# Patient Record
Sex: MALE | Race: WHITE | NOT HISPANIC OR LATINO | Employment: OTHER | URBAN - METROPOLITAN AREA
[De-identification: names, ages, dates, MRNs, and addresses within clinical notes are randomized per-mention and may not be internally consistent; named-entity substitution may affect disease eponyms.]

---

## 2023-04-03 LAB
THYROTROPIN (MIU/L) IN SER/PLAS BY DETECTION LIMIT <= 0.05 MIU/L: 0.04 MIU/L (ref 0.44–3.98)
THYROXINE (T4) FREE (NG/DL) IN SER/PLAS: 1.52 NG/DL (ref 0.61–1.12)

## 2023-05-04 LAB — PROSTATE SPECIFIC AG (NG/ML) IN SER/PLAS: 3.85 NG/ML (ref 0–4)

## 2023-10-01 DIAGNOSIS — N40.1 BENIGN PROSTATIC HYPERPLASIA WITH LOWER URINARY TRACT SYMPTOMS, SYMPTOM DETAILS UNSPECIFIED: ICD-10-CM

## 2023-10-01 DIAGNOSIS — E03.9 HYPOTHYROIDISM, UNSPECIFIED TYPE: Primary | ICD-10-CM

## 2023-10-09 RX ORDER — LEVOTHYROXINE SODIUM 137 UG/1
TABLET ORAL
Qty: 120 TABLET | Refills: 1 | Status: SHIPPED | OUTPATIENT
Start: 2023-10-09 | End: 2024-05-02 | Stop reason: SDUPTHER

## 2023-10-09 RX ORDER — TAMSULOSIN HYDROCHLORIDE 0.4 MG/1
0.4 CAPSULE ORAL DAILY
Qty: 100 CAPSULE | Refills: 1 | Status: SHIPPED | OUTPATIENT
Start: 2023-10-09 | End: 2024-04-29 | Stop reason: SDUPTHER

## 2023-10-09 RX ORDER — FINASTERIDE 5 MG/1
5 TABLET, FILM COATED ORAL DAILY
Qty: 100 TABLET | Refills: 1 | Status: SHIPPED | OUTPATIENT
Start: 2023-10-09 | End: 2024-04-29 | Stop reason: SDUPTHER

## 2023-11-07 PROBLEM — L91.8 OTHER HYPERTROPHIC DISORDERS OF THE SKIN: Status: ACTIVE | Noted: 2018-02-07

## 2023-11-07 PROBLEM — R97.20 ELEVATED PSA: Status: ACTIVE | Noted: 2023-11-07

## 2023-11-07 PROBLEM — K21.9 GASTROESOPHAGEAL REFLUX DISEASE: Status: ACTIVE | Noted: 2018-01-04

## 2023-11-07 PROBLEM — I47.10 SUPRAVENTRICULAR TACHYCARDIA (CMS-HCC): Status: ACTIVE | Noted: 2023-11-07

## 2023-11-07 PROBLEM — R31.9 BLOOD IN URINE: Status: ACTIVE | Noted: 2023-11-07

## 2023-11-07 PROBLEM — M72.2 PLANTAR FASCIAL FIBROMATOSIS: Status: ACTIVE | Noted: 2018-01-04

## 2023-11-07 PROBLEM — L81.4 OTHER MELANIN HYPERPIGMENTATION: Status: ACTIVE | Noted: 2018-02-07

## 2023-11-07 PROBLEM — R97.20 HIGH PROSTATE SPECIFIC ANTIGEN (PSA): Status: ACTIVE | Noted: 2018-01-04

## 2023-11-07 PROBLEM — N40.0 BENIGN PROSTATIC HYPERPLASIA WITHOUT URINARY OBSTRUCTION: Status: ACTIVE | Noted: 2018-01-04

## 2023-11-07 PROBLEM — L73.8 OTHER SPECIFIED FOLLICULAR DISORDERS: Status: ACTIVE | Noted: 2018-02-07

## 2023-11-07 PROBLEM — I47.20 VENTRICULAR TACHYCARDIA (MULTI): Status: ACTIVE | Noted: 2023-11-07

## 2023-11-07 PROBLEM — R31.9 HEMATURIA: Status: ACTIVE | Noted: 2023-11-07

## 2023-11-07 PROBLEM — D22.5 MELANOCYTIC NEVI OF TRUNK: Status: ACTIVE | Noted: 2018-02-07

## 2023-11-07 PROBLEM — D22.30 MELANOCYTIC NEVI OF UNSPECIFIED PART OF FACE: Status: ACTIVE | Noted: 2018-02-07

## 2023-11-07 PROBLEM — D18.01 HEMANGIOMA OF SKIN AND SUBCUTANEOUS TISSUE: Status: ACTIVE | Noted: 2018-02-07

## 2023-11-07 PROBLEM — N18.30 STAGE 3 CHRONIC KIDNEY DISEASE (MULTI): Status: ACTIVE | Noted: 2020-06-19

## 2023-11-07 PROBLEM — L25.9 CONTACT DERMATITIS: Status: ACTIVE | Noted: 2018-01-04

## 2023-11-07 PROBLEM — D22.60 MELANOCYTIC NEVI OF UNSPECIFIED UPPER LIMB, INCLUDING SHOULDER: Status: ACTIVE | Noted: 2018-02-07

## 2023-11-07 PROBLEM — L82.1 OTHER SEBORRHEIC KERATOSIS: Status: ACTIVE | Noted: 2018-02-07

## 2023-11-07 PROBLEM — E78.5 HYPERLIPIDEMIA, UNSPECIFIED: Status: ACTIVE | Noted: 2023-11-07

## 2023-11-07 PROBLEM — R30.0 DYSURIA: Status: ACTIVE | Noted: 2023-11-07

## 2023-11-07 RX ORDER — VIT C/E/ZN/COPPR/LUTEIN/ZEAXAN 250MG-90MG
1 CAPSULE ORAL 2 TIMES DAILY
COMMUNITY

## 2023-11-07 RX ORDER — LANCETS 33 GAUGE
EACH MISCELLANEOUS 2 TIMES DAILY
COMMUNITY
Start: 2022-09-30 | End: 2024-05-02 | Stop reason: SDUPTHER

## 2023-11-07 RX ORDER — DOXAZOSIN 2 MG/1
TABLET ORAL
COMMUNITY
End: 2024-03-08 | Stop reason: ALTCHOICE

## 2023-11-07 RX ORDER — ATORVASTATIN CALCIUM 40 MG/1
40 TABLET, FILM COATED ORAL DAILY
COMMUNITY
End: 2024-03-12

## 2023-11-07 RX ORDER — PHENOL/SODIUM PHENOLATE
20 AEROSOL, SPRAY (ML) MUCOUS MEMBRANE DAILY
COMMUNITY
End: 2024-05-02 | Stop reason: SDUPTHER

## 2023-11-07 RX ORDER — CALCIUM CARBONATE 500(1250)
TABLET,CHEWABLE ORAL
COMMUNITY
End: 2024-03-08 | Stop reason: SDUPTHER

## 2023-11-07 RX ORDER — TRIAMTERENE AND HYDROCHLOROTHIAZIDE 37.5; 25 MG/1; MG/1
1 CAPSULE ORAL DAILY
COMMUNITY
End: 2024-03-12

## 2023-11-07 RX ORDER — DILTIAZEM HYDROCHLORIDE 120 MG/1
120 CAPSULE, EXTENDED RELEASE ORAL DAILY
COMMUNITY
Start: 2014-11-12 | End: 2024-03-12

## 2023-11-07 RX ORDER — LANCETS 28 GAUGE
EACH MISCELLANEOUS
COMMUNITY
Start: 2020-07-30

## 2023-11-07 RX ORDER — IBUPROFEN 600 MG/1
600 TABLET ORAL EVERY 6 HOURS PRN
COMMUNITY
Start: 2017-05-26 | End: 2024-03-08 | Stop reason: ALTCHOICE

## 2023-11-07 RX ORDER — DOXAZOSIN 4 MG/1
2 TABLET ORAL NIGHTLY
COMMUNITY
End: 2023-11-08

## 2023-11-07 RX ORDER — ACETAMINOPHEN 500 MG
5000 TABLET ORAL DAILY
COMMUNITY
End: 2024-06-07 | Stop reason: ENTERED-IN-ERROR

## 2023-11-07 RX ORDER — LEVOTHYROXINE SODIUM 150 UG/1
TABLET ORAL
COMMUNITY
End: 2023-11-08

## 2023-11-07 RX ORDER — ATORVASTATIN CALCIUM 20 MG/1
TABLET, FILM COATED ORAL
COMMUNITY
End: 2023-11-08

## 2023-11-08 ENCOUNTER — OFFICE VISIT (OUTPATIENT)
Dept: PRIMARY CARE | Facility: CLINIC | Age: 73
End: 2023-11-08
Payer: MEDICARE

## 2023-11-08 VITALS
SYSTOLIC BLOOD PRESSURE: 136 MMHG | WEIGHT: 173 LBS | OXYGEN SATURATION: 98 % | HEART RATE: 67 BPM | BODY MASS INDEX: 24.13 KG/M2 | DIASTOLIC BLOOD PRESSURE: 84 MMHG

## 2023-11-08 DIAGNOSIS — R10.31 RIGHT LOWER QUADRANT PAIN: Primary | ICD-10-CM

## 2023-11-08 DIAGNOSIS — R10.33 UMBILICAL PAIN: ICD-10-CM

## 2023-11-08 PROCEDURE — 3079F DIAST BP 80-89 MM HG: CPT | Performed by: INTERNAL MEDICINE

## 2023-11-08 PROCEDURE — 1036F TOBACCO NON-USER: CPT | Performed by: INTERNAL MEDICINE

## 2023-11-08 PROCEDURE — 1159F MED LIST DOCD IN RCRD: CPT | Performed by: INTERNAL MEDICINE

## 2023-11-08 PROCEDURE — 3075F SYST BP GE 130 - 139MM HG: CPT | Performed by: INTERNAL MEDICINE

## 2023-11-08 PROCEDURE — 99213 OFFICE O/P EST LOW 20 MIN: CPT | Performed by: INTERNAL MEDICINE

## 2023-11-08 ASSESSMENT — ENCOUNTER SYMPTOMS
LOSS OF SENSATION IN FEET: 0
DEPRESSION: 0
OCCASIONAL FEELINGS OF UNSTEADINESS: 0

## 2023-11-08 NOTE — PROGRESS NOTES
Subjective   Patient ID: Alfonzo Baldwin is a 73 y.o. male who presents for Abdominal Pain (ABDOMINAL PAIN X 1 MONTH).    HPI   Right lower quad/umbilical discomfort for about a month.  No n/v/diarrhea/constipation.   +recent colonoscopy---+multiple polyps.    Review of Systems  As above    Objective   /84   Pulse 67   Wt 78.5 kg (173 lb)   SpO2 98%   BMI 24.13 kg/m²     Physical Exam  Constitutional:       Appearance: Normal appearance. He is normal weight.   Cardiovascular:      Rate and Rhythm: Normal rate and regular rhythm.      Heart sounds: Normal heart sounds. No murmur heard.  Pulmonary:      Effort: Pulmonary effort is normal.      Breath sounds: Normal breath sounds.   Abdominal:      General: Bowel sounds are normal.      Palpations: Abdomen is soft. There is no mass.      Tenderness: There is abdominal tenderness (rads to the right umbilicus). There is no rebound.      Hernia: No hernia is present.   Skin:     General: Skin is warm and dry.   Neurological:      Mental Status: He is alert.       Assessment/Plan     Problem List Items Addressed This Visit    None  Visit Diagnoses       Right lower quadrant pain    -  Primary    Relevant Orders    US abdomen complete    Umbilical pain        Relevant Orders    US abdomen complete

## 2023-11-21 ENCOUNTER — CLINICAL SUPPORT (OUTPATIENT)
Dept: PRIMARY CARE | Facility: CLINIC | Age: 73
End: 2023-11-21
Payer: MEDICARE

## 2023-11-21 DIAGNOSIS — R10.31 RIGHT LOWER QUADRANT PAIN: ICD-10-CM

## 2023-11-21 DIAGNOSIS — R10.33 UMBILICAL PAIN: ICD-10-CM

## 2023-11-21 PROCEDURE — 76705 ECHO EXAM OF ABDOMEN: CPT | Performed by: RADIOLOGY

## 2023-11-21 PROCEDURE — 76700 US EXAM ABDOM COMPLETE: CPT | Performed by: RADIOLOGY

## 2023-12-08 ENCOUNTER — OFFICE VISIT (OUTPATIENT)
Dept: PRIMARY CARE | Facility: CLINIC | Age: 73
End: 2023-12-08
Payer: MEDICARE

## 2023-12-08 ENCOUNTER — TELEPHONE (OUTPATIENT)
Dept: PRIMARY CARE | Facility: CLINIC | Age: 73
End: 2023-12-08

## 2023-12-08 VITALS
DIASTOLIC BLOOD PRESSURE: 60 MMHG | RESPIRATION RATE: 16 BRPM | WEIGHT: 176 LBS | SYSTOLIC BLOOD PRESSURE: 140 MMHG | HEART RATE: 53 BPM | BODY MASS INDEX: 25.2 KG/M2 | HEIGHT: 70 IN

## 2023-12-08 DIAGNOSIS — R10.31 RIGHT LOWER QUADRANT ABDOMINAL PAIN: ICD-10-CM

## 2023-12-08 DIAGNOSIS — E11.65 TYPE 2 DIABETES MELLITUS WITH HYPERGLYCEMIA, WITHOUT LONG-TERM CURRENT USE OF INSULIN (MULTI): Primary | ICD-10-CM

## 2023-12-08 DIAGNOSIS — E11.21 TYPE 2 DIABETES MELLITUS WITH DIABETIC NEPHROPATHY, WITHOUT LONG-TERM CURRENT USE OF INSULIN (MULTI): Primary | ICD-10-CM

## 2023-12-08 LAB
POC FINGERSTICK BLOOD GLUCOSE: 172 MG/DL (ref 70–100)
POC HEMOGLOBIN A1C: 7.8 % (ref 4.2–6.5)

## 2023-12-08 PROCEDURE — 82962 GLUCOSE BLOOD TEST: CPT | Performed by: NURSE PRACTITIONER

## 2023-12-08 PROCEDURE — 99213 OFFICE O/P EST LOW 20 MIN: CPT | Performed by: NURSE PRACTITIONER

## 2023-12-08 PROCEDURE — 3078F DIAST BP <80 MM HG: CPT | Performed by: NURSE PRACTITIONER

## 2023-12-08 PROCEDURE — 1159F MED LIST DOCD IN RCRD: CPT | Performed by: NURSE PRACTITIONER

## 2023-12-08 PROCEDURE — 3077F SYST BP >= 140 MM HG: CPT | Performed by: NURSE PRACTITIONER

## 2023-12-08 PROCEDURE — 1036F TOBACCO NON-USER: CPT | Performed by: NURSE PRACTITIONER

## 2023-12-08 PROCEDURE — 83036 HEMOGLOBIN GLYCOSYLATED A1C: CPT | Performed by: NURSE PRACTITIONER

## 2023-12-08 RX ORDER — DAPAGLIFLOZIN 10 MG/1
10 TABLET, FILM COATED ORAL DAILY
Qty: 90 TABLET | Refills: 2 | Status: SHIPPED | OUTPATIENT
Start: 2023-12-08 | End: 2024-05-02 | Stop reason: SDUPTHER

## 2023-12-08 NOTE — PROGRESS NOTES
" Alfonzo Baldwin is a 73 y.o. here for a diabetic follow up exam.     Pt states they are doing well. Following a low carbohydrate diet and is active.     Up to date with eye and foot exams, pcp visits.     Aic up to 7.8  Last 7.1    Wt 176 - last 173  Meds for dm2 -   None    [facility]    Just got over abdominal pain just saw dr frias 11/23 and ok there    /60   Pulse 53   Resp 16   Ht 1.778 m (5' 10\")   Wt 79.8 kg (176 lb)   BMI 25.25 kg/m²      Lab Results   Component Value Date    POCGLU 172 (A) 12/08/2023    HGBA1C 7.8 (A) 12/08/2023    HGBA1C 5.9 06/19/2020    HGBA1C 7.1 10/04/2019   (  [unfilled]   Visit Vitals  /60   Pulse 53   Resp 16        Current Outpatient Medications on File Prior to Visit   Medication Sig Dispense Refill    atorvastatin (Lipitor) 40 mg tablet Take 1 tablet (40 mg) by mouth once daily.      blood sugar diagnostic (TRUETEST TEST STRIPS MISC)       calcium carbonate 500 mg calcium (1,250 mg) chewable tablet Chew.      cholecalciferol (Vitamin D-3) 5,000 Units tablet Take 1 tablet (5,000 Units) by mouth once daily.      dilTIAZem XR (Dilacor XR) 120 mg 24 hr capsule Take 1 capsule (120 mg) by mouth once daily.      doxazosin (Cardura) 2 mg tablet Take by mouth.      finasteride (Proscar) 5 mg tablet Take 1 tablet (5 mg) by mouth once daily. Do not crush, chew, or split. 100 tablet 1    FreeStyle Lancets 28 gauge by Does not apply route 2 times a day.      ibuprofen 600 mg tablet Take 1 tablet (600 mg) by mouth every 6 hours if needed.      lancets (Micro Thin Lancets) 33 gauge misc by Does not apply route 2 times a day.      levothyroxine (Synthroid, Levoxyl) 137 mcg tablet TAKE 1 TABLET BY MOUTH MONDAY THROUGH SATURDAY AND 2 TABLETS ON SUNDAY (Patient taking differently: EVERY OTHER DAY) 120 tablet 1    omeprazole (PriLOSEC) 20 mg tablet,delayed release (DR/EC) EC tablet Take 1 tablet (20 mg) by mouth once daily.      tamsulosin " (Flomax) 0.4 mg 24 hr capsule Take 1 capsule (0.4 mg) by mouth once daily. 100 capsule 1    triamterene-hydrochlorothiazid (Dyazide) 37.5-25 mg capsule Take 1 capsule by mouth once daily.      vit C,H-Bw-epjgl-lutein-zeaxan (PreserVision AREDS-2) 250-90-40-1 mg capsule Take by mouth.      VIT C-MULTIVIT-MIN-ELDERBERRY ORAL Elderberry-Maltodextrin POWD   Refills: 0       Active       No current facility-administered medications on file prior to visit.      Review of Systems   Physical Exam     Assessment and Plan:  No problem-specific Assessment & Plan notes found for this encounter.     Problem List Items Addressed This Visit    None  Visit Diagnoses         Codes    Type 2 diabetes mellitus with hyperglycemia, without long-term current use of insulin (CMS/[facility])    -  Primary E11.65    Relevant Orders    POCT Fingerstick Glucose manually resulted (Completed)    POCT glycosylated hemoglobin (Hb A1C) manually resulted (Completed)    Albumin , Urine Random

## 2023-12-08 NOTE — TELEPHONE ENCOUNTER
Pt wife went to  [facility] for pt - would be over $200, but that is too expensive. States you had some other suggestions of meds to call in?  Please advise

## 2023-12-12 ENCOUNTER — ANCILLARY PROCEDURE (OUTPATIENT)
Dept: RADIOLOGY | Facility: CLINIC | Age: 73
End: 2023-12-12
Payer: MEDICARE

## 2023-12-12 DIAGNOSIS — R10.31 RIGHT LOWER QUADRANT ABDOMINAL PAIN: ICD-10-CM

## 2023-12-12 PROCEDURE — 74176 CT ABD & PELVIS W/O CONTRAST: CPT

## 2023-12-12 PROCEDURE — 74176 CT ABD & PELVIS W/O CONTRAST: CPT | Performed by: RADIOLOGY

## 2023-12-13 DIAGNOSIS — R10.84 GENERALIZED ABDOMINAL PAIN: Primary | ICD-10-CM

## 2023-12-13 DIAGNOSIS — N28.1 COMPLEX RENAL CYST: ICD-10-CM

## 2023-12-13 NOTE — RESULT ENCOUNTER NOTE
Pls inform  Kidney stones but on the left. Rest is not new/it is chronic changes.  He has a nodule vs enlarging complex cyst left kidney needs an MRI. Ordered please set up.

## 2023-12-27 ENCOUNTER — HOSPITAL ENCOUNTER (OUTPATIENT)
Dept: RADIOLOGY | Facility: CLINIC | Age: 73
Discharge: HOME | End: 2023-12-27
Payer: MEDICARE

## 2023-12-27 DIAGNOSIS — R10.84 GENERALIZED ABDOMINAL PAIN: ICD-10-CM

## 2023-12-27 DIAGNOSIS — N28.1 COMPLEX RENAL CYST: ICD-10-CM

## 2023-12-27 PROCEDURE — 74181 MRI ABDOMEN W/O CONTRAST: CPT | Performed by: RADIOLOGY

## 2023-12-27 PROCEDURE — 74181 MRI ABDOMEN W/O CONTRAST: CPT

## 2024-03-08 ENCOUNTER — OFFICE VISIT (OUTPATIENT)
Dept: PRIMARY CARE | Facility: CLINIC | Age: 74
End: 2024-03-08
Payer: MEDICARE

## 2024-03-08 VITALS
OXYGEN SATURATION: 96 % | RESPIRATION RATE: 16 BRPM | WEIGHT: 173 LBS | BODY MASS INDEX: 24.82 KG/M2 | SYSTOLIC BLOOD PRESSURE: 120 MMHG | HEART RATE: 49 BPM | DIASTOLIC BLOOD PRESSURE: 70 MMHG | TEMPERATURE: 97.4 F

## 2024-03-08 DIAGNOSIS — E11.65 TYPE 2 DIABETES MELLITUS WITH HYPERGLYCEMIA, WITHOUT LONG-TERM CURRENT USE OF INSULIN (MULTI): ICD-10-CM

## 2024-03-08 DIAGNOSIS — E11.21 TYPE 2 DIABETES MELLITUS WITH DIABETIC NEPHROPATHY, WITHOUT LONG-TERM CURRENT USE OF INSULIN (MULTI): Primary | ICD-10-CM

## 2024-03-08 LAB
POC FINGERSTICK BLOOD GLUCOSE: 215 MG/DL (ref 70–100)
POC HEMOGLOBIN A1C: 7.2 % (ref 4.2–6.5)

## 2024-03-08 PROCEDURE — 1159F MED LIST DOCD IN RCRD: CPT | Performed by: NURSE PRACTITIONER

## 2024-03-08 PROCEDURE — 3078F DIAST BP <80 MM HG: CPT | Performed by: NURSE PRACTITIONER

## 2024-03-08 PROCEDURE — 82962 GLUCOSE BLOOD TEST: CPT | Performed by: NURSE PRACTITIONER

## 2024-03-08 PROCEDURE — 1036F TOBACCO NON-USER: CPT | Performed by: NURSE PRACTITIONER

## 2024-03-08 PROCEDURE — 99213 OFFICE O/P EST LOW 20 MIN: CPT | Performed by: NURSE PRACTITIONER

## 2024-03-08 PROCEDURE — 3074F SYST BP LT 130 MM HG: CPT | Performed by: NURSE PRACTITIONER

## 2024-03-08 PROCEDURE — 83036 HEMOGLOBIN GLYCOSYLATED A1C: CPT | Performed by: NURSE PRACTITIONER

## 2024-03-08 RX ORDER — CALCIUM CARBONATE/VITAMIN D3 500 MG-10
1 TABLET ORAL 2 TIMES DAILY
COMMUNITY
Start: 2024-03-02 | End: 2024-06-08 | Stop reason: HOSPADM

## 2024-03-08 ASSESSMENT — ENCOUNTER SYMPTOMS
PSYCHIATRIC NEGATIVE: 1
CARDIOVASCULAR NEGATIVE: 1
ENDOCRINE NEGATIVE: 1
GASTROINTESTINAL NEGATIVE: 1
CONSTITUTIONAL NEGATIVE: 1
RESPIRATORY NEGATIVE: 1
NEUROLOGICAL NEGATIVE: 1
MUSCULOSKELETAL NEGATIVE: 1

## 2024-03-08 NOTE — PROGRESS NOTES
Alfonzo Baldwin is a 74 y.o. here for a diabetic follow up exam.     Pt states they are doing well. Following a low carbohydrate diet and is active.     Up to date with eye and foot exams, pcp visits.     Last wt on [id number]  Last bmi 24.25  Last aic 7.8 - all diet till this  today 7.2  [facility]  [facility]    Meds:  Farxiga 10mg daily   Note hr - has had work up, dr ying   Normal hr for the pt per cards   He feels fine no s/s     Visit Vitals  /70   Pulse (!) 49   Temp 36.3 °C (97.4 °F) (Temporal)   Resp 16      Lab Results   Component Value Date    POCGLU 215 (A) 03/08/2024    POCGLU 172 (A) 12/08/2023    HGBA1C 7.2 (A) 03/08/2024    HGBA1C 7.8 (A) 12/08/2023    HGBA1C 5.9 06/19/2020    HGBA1C 7.1 10/04/2019        There were no vitals taken for this visit.   Wt Readings from Last 5 Encounters:   12/27/23 76.7 kg (169 lb)   12/08/23 79.8 kg (176 lb)   11/08/23 78.5 kg (173 lb)   06/02/23 77.1 kg (170 lb)   05/18/23 77.1 kg (170 lb)          Review of Systems   Constitutional: Negative.    HENT: Negative.     Respiratory: Negative.     Cardiovascular: Negative.    Gastrointestinal: Negative.    Endocrine: Negative.    Genitourinary: Negative.    Musculoskeletal: Negative.    Skin: Negative.    Neurological: Negative.    Psychiatric/Behavioral: Negative.          Physical Exam  Vitals and nursing note reviewed.   Constitutional:       Appearance: Normal appearance.   HENT:      Head: Normocephalic.   Eyes:      Pupils: Pupils are equal, round, and reactive to light.   Cardiovascular:      Rate and Rhythm: Normal rate and regular rhythm.      Heart sounds: Normal heart sounds.   Pulmonary:      Effort: Pulmonary effort is normal.      Breath sounds: Normal breath sounds.   Skin:     General: Skin is warm and dry.   Neurological:      General: No focal deficit present.      Mental Status: He is alert and oriented to person, place, and time. Mental status is at baseline.    Psychiatric:         Mood and Affect: Mood normal.         Behavior: Behavior normal.         Thought Content: Thought content normal.         Judgment: Judgment normal.         Problem List Items Addressed This Visit    None  Visit Diagnoses         Codes    Type 2 diabetes mellitus with diabetic nephropathy, without long-term current use of insulin (CMS/[facility])    -  Primary E11.21

## 2024-03-11 DIAGNOSIS — K21.9 GASTROESOPHAGEAL REFLUX DISEASE WITHOUT ESOPHAGITIS: Primary | ICD-10-CM

## 2024-03-11 DIAGNOSIS — I10 ESSENTIAL (PRIMARY) HYPERTENSION: ICD-10-CM

## 2024-03-11 DIAGNOSIS — E78.2 MIXED HYPERLIPIDEMIA: ICD-10-CM

## 2024-03-12 RX ORDER — TRIAMTERENE AND HYDROCHLOROTHIAZIDE 37.5; 25 MG/1; MG/1
1 CAPSULE ORAL DAILY
Qty: 100 CAPSULE | Refills: 1 | Status: SHIPPED | OUTPATIENT
Start: 2024-03-12

## 2024-03-12 RX ORDER — DILTIAZEM HYDROCHLORIDE 120 MG/1
120 CAPSULE, EXTENDED RELEASE ORAL DAILY
Qty: 100 CAPSULE | Refills: 1 | Status: SHIPPED | OUTPATIENT
Start: 2024-03-12 | End: 2024-06-08 | Stop reason: HOSPADM

## 2024-03-12 RX ORDER — OMEPRAZOLE 20 MG/1
20 CAPSULE, DELAYED RELEASE ORAL DAILY
Qty: 100 CAPSULE | Refills: 1 | Status: SHIPPED | OUTPATIENT
Start: 2024-03-12 | End: 2024-06-11 | Stop reason: ALTCHOICE

## 2024-03-12 RX ORDER — ATORVASTATIN CALCIUM 40 MG/1
40 TABLET, FILM COATED ORAL DAILY
Qty: 100 TABLET | Refills: 1 | Status: SHIPPED | OUTPATIENT
Start: 2024-03-12 | End: 2024-06-08 | Stop reason: HOSPADM

## 2024-04-29 DIAGNOSIS — E11.21 TYPE 2 DIABETES MELLITUS WITH DIABETIC NEPHROPATHY, WITHOUT LONG-TERM CURRENT USE OF INSULIN (MULTI): ICD-10-CM

## 2024-04-29 DIAGNOSIS — N40.1 BENIGN PROSTATIC HYPERPLASIA WITH LOWER URINARY TRACT SYMPTOMS, SYMPTOM DETAILS UNSPECIFIED: ICD-10-CM

## 2024-04-29 DIAGNOSIS — K21.9 GASTROESOPHAGEAL REFLUX DISEASE WITHOUT ESOPHAGITIS: Primary | ICD-10-CM

## 2024-04-29 DIAGNOSIS — E03.9 HYPOTHYROIDISM, UNSPECIFIED TYPE: ICD-10-CM

## 2024-05-02 RX ORDER — LEVOTHYROXINE SODIUM 137 UG/1
TABLET ORAL
Qty: 100 TABLET | Refills: 1 | Status: SHIPPED | OUTPATIENT
Start: 2024-05-02 | End: 2024-06-11

## 2024-05-02 RX ORDER — FINASTERIDE 5 MG/1
5 TABLET, FILM COATED ORAL DAILY
Qty: 100 TABLET | Refills: 1 | Status: SHIPPED | OUTPATIENT
Start: 2024-05-02

## 2024-05-02 RX ORDER — DAPAGLIFLOZIN 10 MG/1
10 TABLET, FILM COATED ORAL DAILY
Qty: 100 TABLET | Refills: 1 | Status: SHIPPED | OUTPATIENT
Start: 2024-05-02 | End: 2024-06-11

## 2024-05-02 RX ORDER — PHENOL/SODIUM PHENOLATE
20 AEROSOL, SPRAY (ML) MUCOUS MEMBRANE DAILY
Qty: 100 TABLET | Refills: 1 | Status: SHIPPED | OUTPATIENT
Start: 2024-05-02

## 2024-05-02 RX ORDER — LANCETS 33 GAUGE
1 EACH MISCELLANEOUS 2 TIMES DAILY
Qty: 200 EACH | Refills: 1 | Status: SHIPPED | OUTPATIENT
Start: 2024-05-02

## 2024-05-02 RX ORDER — TAMSULOSIN HYDROCHLORIDE 0.4 MG/1
0.4 CAPSULE ORAL DAILY
Qty: 100 CAPSULE | Refills: 1 | Status: SHIPPED | OUTPATIENT
Start: 2024-05-02

## 2024-05-28 ENCOUNTER — LAB (OUTPATIENT)
Dept: LAB | Facility: LAB | Age: 74
End: 2024-05-28
Payer: MEDICARE

## 2024-05-28 DIAGNOSIS — Z12.5 SCREENING PSA (PROSTATE SPECIFIC ANTIGEN): ICD-10-CM

## 2024-05-28 DIAGNOSIS — Z12.5 SCREENING PSA (PROSTATE SPECIFIC ANTIGEN): Primary | ICD-10-CM

## 2024-05-28 LAB — PSA SERPL-MCNC: 2.92 NG/ML

## 2024-05-28 PROCEDURE — G0103 PSA SCREENING: HCPCS

## 2024-06-06 ENCOUNTER — APPOINTMENT (OUTPATIENT)
Dept: RADIOLOGY | Facility: HOSPITAL | Age: 74
End: 2024-06-06
Payer: MEDICARE

## 2024-06-06 ENCOUNTER — APPOINTMENT (OUTPATIENT)
Dept: CARDIOLOGY | Facility: HOSPITAL | Age: 74
End: 2024-06-06
Payer: MEDICARE

## 2024-06-06 ENCOUNTER — HOSPITAL ENCOUNTER (INPATIENT)
Facility: HOSPITAL | Age: 74
LOS: 1 days | Discharge: HOME | End: 2024-06-08
Attending: EMERGENCY MEDICINE | Admitting: STUDENT IN AN ORGANIZED HEALTH CARE EDUCATION/TRAINING PROGRAM
Payer: MEDICARE

## 2024-06-06 DIAGNOSIS — Z95.5 S/P CORONARY ARTERY STENT PLACEMENT: ICD-10-CM

## 2024-06-06 DIAGNOSIS — F41.9 ANXIETY: ICD-10-CM

## 2024-06-06 DIAGNOSIS — I21.4 NSTEMI (NON-ST ELEVATED MYOCARDIAL INFARCTION) (MULTI): Primary | ICD-10-CM

## 2024-06-06 PROBLEM — E87.6 HYPOKALEMIA: Status: ACTIVE | Noted: 2024-06-06

## 2024-06-06 PROBLEM — R73.9 HYPERGLYCEMIA: Status: ACTIVE | Noted: 2024-06-06

## 2024-06-06 PROBLEM — E11.9 DM2 (DIABETES MELLITUS, TYPE 2) (MULTI): Status: ACTIVE | Noted: 2024-06-06

## 2024-06-06 LAB
ALBUMIN SERPL BCP-MCNC: 4.4 G/DL (ref 3.4–5)
ALP SERPL-CCNC: 47 U/L (ref 33–136)
ALT SERPL W P-5'-P-CCNC: 34 U/L (ref 10–52)
ANION GAP SERPL CALC-SCNC: 14 MMOL/L (ref 10–20)
AST SERPL W P-5'-P-CCNC: 26 U/L (ref 9–39)
BASOPHILS # BLD AUTO: 0.07 X10*3/UL (ref 0–0.1)
BASOPHILS NFR BLD AUTO: 1 %
BILIRUB SERPL-MCNC: 0.6 MG/DL (ref 0–1.2)
BNP SERPL-MCNC: 74 PG/ML (ref 0–99)
BUN SERPL-MCNC: 28 MG/DL (ref 6–23)
CALCIUM SERPL-MCNC: 9.3 MG/DL (ref 8.6–10.3)
CARDIAC TROPONIN I PNL SERPL HS: 1204 NG/L (ref 0–20)
CARDIAC TROPONIN I PNL SERPL HS: 836 NG/L (ref 0–20)
CHLORIDE SERPL-SCNC: 102 MMOL/L (ref 98–107)
CO2 SERPL-SCNC: 24 MMOL/L (ref 21–32)
CREAT SERPL-MCNC: 1.65 MG/DL (ref 0.5–1.3)
EGFRCR SERPLBLD CKD-EPI 2021: 43 ML/MIN/1.73M*2
EOSINOPHIL # BLD AUTO: 0.22 X10*3/UL (ref 0–0.4)
EOSINOPHIL NFR BLD AUTO: 3.2 %
ERYTHROCYTE [DISTWIDTH] IN BLOOD BY AUTOMATED COUNT: 13 % (ref 11.5–14.5)
GLUCOSE SERPL-MCNC: 175 MG/DL (ref 74–99)
HCT VFR BLD AUTO: 48.2 % (ref 41–52)
HGB BLD-MCNC: 17.6 G/DL (ref 13.5–17.5)
IMM GRANULOCYTES # BLD AUTO: 0.02 X10*3/UL (ref 0–0.5)
IMM GRANULOCYTES NFR BLD AUTO: 0.3 % (ref 0–0.9)
INR PPP: 1 (ref 0.9–1.1)
LYMPHOCYTES # BLD AUTO: 2.39 X10*3/UL (ref 0.8–3)
LYMPHOCYTES NFR BLD AUTO: 34.8 %
MAGNESIUM SERPL-MCNC: 1.91 MG/DL (ref 1.6–2.4)
MCH RBC QN AUTO: 29.2 PG (ref 26–34)
MCHC RBC AUTO-ENTMCNC: 36.5 G/DL (ref 32–36)
MCV RBC AUTO: 80 FL (ref 80–100)
MONOCYTES # BLD AUTO: 0.57 X10*3/UL (ref 0.05–0.8)
MONOCYTES NFR BLD AUTO: 8.3 %
NEUTROPHILS # BLD AUTO: 3.59 X10*3/UL (ref 1.6–5.5)
NEUTROPHILS NFR BLD AUTO: 52.4 %
NRBC BLD-RTO: 0 /100 WBCS (ref 0–0)
PLATELET # BLD AUTO: 165 X10*3/UL (ref 150–450)
POTASSIUM SERPL-SCNC: 3.1 MMOL/L (ref 3.5–5.3)
PROT SERPL-MCNC: 7.2 G/DL (ref 6.4–8.2)
PROTHROMBIN TIME: 11.4 SECONDS (ref 9.8–12.8)
RBC # BLD AUTO: 6.02 X10*6/UL (ref 4.5–5.9)
SODIUM SERPL-SCNC: 137 MMOL/L (ref 136–145)
WBC # BLD AUTO: 6.9 X10*3/UL (ref 4.4–11.3)

## 2024-06-06 PROCEDURE — 83880 ASSAY OF NATRIURETIC PEPTIDE: CPT | Performed by: EMERGENCY MEDICINE

## 2024-06-06 PROCEDURE — 71045 X-RAY EXAM CHEST 1 VIEW: CPT

## 2024-06-06 PROCEDURE — 96374 THER/PROPH/DIAG INJ IV PUSH: CPT | Mod: MUE

## 2024-06-06 PROCEDURE — 36415 COLL VENOUS BLD VENIPUNCTURE: CPT | Performed by: EMERGENCY MEDICINE

## 2024-06-06 PROCEDURE — 93005 ELECTROCARDIOGRAM TRACING: CPT

## 2024-06-06 PROCEDURE — 84484 ASSAY OF TROPONIN QUANT: CPT | Performed by: EMERGENCY MEDICINE

## 2024-06-06 PROCEDURE — 85025 COMPLETE CBC W/AUTO DIFF WBC: CPT | Performed by: EMERGENCY MEDICINE

## 2024-06-06 PROCEDURE — 83735 ASSAY OF MAGNESIUM: CPT | Performed by: EMERGENCY MEDICINE

## 2024-06-06 PROCEDURE — 99223 1ST HOSP IP/OBS HIGH 75: CPT | Performed by: STUDENT IN AN ORGANIZED HEALTH CARE EDUCATION/TRAINING PROGRAM

## 2024-06-06 PROCEDURE — 85610 PROTHROMBIN TIME: CPT | Performed by: EMERGENCY MEDICINE

## 2024-06-06 PROCEDURE — 2500000001 HC RX 250 WO HCPCS SELF ADMINISTERED DRUGS (ALT 637 FOR MEDICARE OP): Performed by: EMERGENCY MEDICINE

## 2024-06-06 PROCEDURE — 84484 ASSAY OF TROPONIN QUANT: CPT | Mod: 91 | Performed by: EMERGENCY MEDICINE

## 2024-06-06 PROCEDURE — 80053 COMPREHEN METABOLIC PANEL: CPT | Performed by: EMERGENCY MEDICINE

## 2024-06-06 PROCEDURE — 2500000002 HC RX 250 W HCPCS SELF ADMINISTERED DRUGS (ALT 637 FOR MEDICARE OP, ALT 636 FOR OP/ED): Performed by: STUDENT IN AN ORGANIZED HEALTH CARE EDUCATION/TRAINING PROGRAM

## 2024-06-06 PROCEDURE — 2500000001 HC RX 250 WO HCPCS SELF ADMINISTERED DRUGS (ALT 637 FOR MEDICARE OP): Performed by: STUDENT IN AN ORGANIZED HEALTH CARE EDUCATION/TRAINING PROGRAM

## 2024-06-06 PROCEDURE — 2500000004 HC RX 250 GENERAL PHARMACY W/ HCPCS (ALT 636 FOR OP/ED): Performed by: EMERGENCY MEDICINE

## 2024-06-06 PROCEDURE — 99291 CRITICAL CARE FIRST HOUR: CPT | Performed by: EMERGENCY MEDICINE

## 2024-06-06 PROCEDURE — 71045 X-RAY EXAM CHEST 1 VIEW: CPT | Performed by: RADIOLOGY

## 2024-06-06 PROCEDURE — 2500000002 HC RX 250 W HCPCS SELF ADMINISTERED DRUGS (ALT 637 FOR MEDICARE OP, ALT 636 FOR OP/ED): Mod: MUE | Performed by: EMERGENCY MEDICINE

## 2024-06-06 RX ORDER — METOPROLOL TARTRATE 25 MG/1
25 TABLET, FILM COATED ORAL 2 TIMES DAILY
Status: DISCONTINUED | OUTPATIENT
Start: 2024-06-06 | End: 2024-06-08 | Stop reason: HOSPADM

## 2024-06-06 RX ORDER — DILTIAZEM HYDROCHLORIDE 120 MG/1
120 CAPSULE, COATED, EXTENDED RELEASE ORAL DAILY
Status: DISCONTINUED | OUTPATIENT
Start: 2024-06-07 | End: 2024-06-08

## 2024-06-06 RX ORDER — NAPROXEN SODIUM 220 MG/1
324 TABLET, FILM COATED ORAL ONCE
Status: COMPLETED | OUTPATIENT
Start: 2024-06-06 | End: 2024-06-06

## 2024-06-06 RX ORDER — PANTOPRAZOLE SODIUM 40 MG/10ML
40 INJECTION, POWDER, LYOPHILIZED, FOR SOLUTION INTRAVENOUS
Status: DISCONTINUED | OUTPATIENT
Start: 2024-06-07 | End: 2024-06-08 | Stop reason: HOSPADM

## 2024-06-06 RX ORDER — PANTOPRAZOLE SODIUM 40 MG/1
40 TABLET, DELAYED RELEASE ORAL
Status: DISCONTINUED | OUTPATIENT
Start: 2024-06-07 | End: 2024-06-08 | Stop reason: HOSPADM

## 2024-06-06 RX ORDER — TAMSULOSIN HYDROCHLORIDE 0.4 MG/1
0.4 CAPSULE ORAL DAILY
Status: DISCONTINUED | OUTPATIENT
Start: 2024-06-07 | End: 2024-06-08 | Stop reason: HOSPADM

## 2024-06-06 RX ORDER — TRIAMTERENE/HYDROCHLOROTHIAZID 37.5-25 MG
1 TABLET ORAL DAILY
Status: DISCONTINUED | OUTPATIENT
Start: 2024-06-07 | End: 2024-06-08 | Stop reason: HOSPADM

## 2024-06-06 RX ORDER — BISACODYL 5 MG
10 TABLET, DELAYED RELEASE (ENTERIC COATED) ORAL DAILY PRN
Status: DISCONTINUED | OUTPATIENT
Start: 2024-06-06 | End: 2024-06-08 | Stop reason: HOSPADM

## 2024-06-06 RX ORDER — ONDANSETRON 4 MG/1
4 TABLET, FILM COATED ORAL EVERY 8 HOURS PRN
Status: DISCONTINUED | OUTPATIENT
Start: 2024-06-06 | End: 2024-06-08 | Stop reason: HOSPADM

## 2024-06-06 RX ORDER — BISACODYL 10 MG/1
10 SUPPOSITORY RECTAL DAILY PRN
Status: DISCONTINUED | OUTPATIENT
Start: 2024-06-06 | End: 2024-06-08 | Stop reason: HOSPADM

## 2024-06-06 RX ORDER — GUAIFENESIN 600 MG/1
600 TABLET, EXTENDED RELEASE ORAL EVERY 12 HOURS PRN
Status: DISCONTINUED | OUTPATIENT
Start: 2024-06-06 | End: 2024-06-08 | Stop reason: HOSPADM

## 2024-06-06 RX ORDER — POTASSIUM CHLORIDE 750 MG/1
40 TABLET, FILM COATED, EXTENDED RELEASE ORAL ONCE
Status: COMPLETED | OUTPATIENT
Start: 2024-06-06 | End: 2024-06-06

## 2024-06-06 RX ORDER — ASPIRIN 81 MG/1
81 TABLET ORAL DAILY
Status: DISCONTINUED | OUTPATIENT
Start: 2024-06-07 | End: 2024-06-08 | Stop reason: HOSPADM

## 2024-06-06 RX ORDER — HEPARIN SODIUM 10000 [USP'U]/100ML
0-4000 INJECTION, SOLUTION INTRAVENOUS CONTINUOUS
Status: DISCONTINUED | OUTPATIENT
Start: 2024-06-06 | End: 2024-06-07

## 2024-06-06 RX ORDER — FINASTERIDE 5 MG/1
5 TABLET, FILM COATED ORAL DAILY
Status: DISCONTINUED | OUTPATIENT
Start: 2024-06-07 | End: 2024-06-08 | Stop reason: HOSPADM

## 2024-06-06 RX ORDER — ONDANSETRON HYDROCHLORIDE 2 MG/ML
4 INJECTION, SOLUTION INTRAVENOUS EVERY 8 HOURS PRN
Status: DISCONTINUED | OUTPATIENT
Start: 2024-06-06 | End: 2024-06-08 | Stop reason: HOSPADM

## 2024-06-06 RX ORDER — ATORVASTATIN CALCIUM 40 MG/1
40 TABLET, FILM COATED ORAL DAILY
Status: DISCONTINUED | OUTPATIENT
Start: 2024-06-07 | End: 2024-06-07

## 2024-06-06 RX ORDER — TALC
3 POWDER (GRAM) TOPICAL NIGHTLY PRN
Status: DISCONTINUED | OUTPATIENT
Start: 2024-06-06 | End: 2024-06-08 | Stop reason: HOSPADM

## 2024-06-06 RX ADMIN — HEPARIN SODIUM 900 UNITS/HR: 10000 INJECTION, SOLUTION INTRAVENOUS at 22:15

## 2024-06-06 RX ADMIN — ASPIRIN 81 MG CHEWABLE TABLET 324 MG: 81 TABLET CHEWABLE at 20:59

## 2024-06-06 RX ADMIN — TICAGRELOR 180 MG: 90 TABLET ORAL at 23:24

## 2024-06-06 RX ADMIN — METOPROLOL TARTRATE 25 MG: 25 TABLET, FILM COATED ORAL at 23:24

## 2024-06-06 RX ADMIN — POTASSIUM CHLORIDE 40 MEQ: 750 TABLET, FILM COATED, EXTENDED RELEASE ORAL at 21:42

## 2024-06-06 SDOH — SOCIAL STABILITY: SOCIAL INSECURITY: WERE YOU ABLE TO COMPLETE ALL THE BEHAVIORAL HEALTH SCREENINGS?: YES

## 2024-06-06 SDOH — SOCIAL STABILITY: SOCIAL INSECURITY: HAVE YOU HAD THOUGHTS OF HARMING ANYONE ELSE?: NO

## 2024-06-06 ASSESSMENT — ACTIVITIES OF DAILY LIVING (ADL)
ADEQUATE_TO_COMPLETE_ADL: YES
GROOMING: INDEPENDENT
JUDGMENT_ADEQUATE_SAFELY_COMPLETE_DAILY_ACTIVITIES: YES
LACK_OF_TRANSPORTATION: NO
HEARING - LEFT EAR: FUNCTIONAL
DRESSING YOURSELF: INDEPENDENT
FEEDING YOURSELF: INDEPENDENT
PATIENT'S MEMORY ADEQUATE TO SAFELY COMPLETE DAILY ACTIVITIES?: YES
HEARING - RIGHT EAR: FUNCTIONAL
BATHING: INDEPENDENT
TOILETING: INDEPENDENT
ASSISTIVE_DEVICE: EYEGLASSES
WALKS IN HOME: INDEPENDENT

## 2024-06-06 ASSESSMENT — COGNITIVE AND FUNCTIONAL STATUS - GENERAL
DAILY ACTIVITIY SCORE: 24
MOBILITY SCORE: 24
PATIENT BASELINE BEDBOUND: NO

## 2024-06-06 ASSESSMENT — ENCOUNTER SYMPTOMS
DYSURIA: 0
ABDOMINAL PAIN: 0
APPETITE CHANGE: 0
COLOR CHANGE: 0
CONSTIPATION: 0
PALPITATIONS: 0
NUMBNESS: 0
MYALGIAS: 0
FLANK PAIN: 0
WHEEZING: 0
DIFFICULTY URINATING: 0
HEMATURIA: 0
CONFUSION: 0
SHORTNESS OF BREATH: 1
STRIDOR: 0
APNEA: 0
ACTIVITY CHANGE: 0
ARTHRALGIAS: 0
LIGHT-HEADEDNESS: 0
HEADACHES: 0
DIAPHORESIS: 0
WOUND: 0
DECREASED CONCENTRATION: 0
DIARRHEA: 0
FATIGUE: 0
JOINT SWELLING: 0
CHEST TIGHTNESS: 0
ABDOMINAL DISTENTION: 0
SORE THROAT: 0
SINUS PAIN: 0
BACK PAIN: 0
RHINORRHEA: 0
VOMITING: 0
FEVER: 0
NAUSEA: 0
DIZZINESS: 0
NERVOUS/ANXIOUS: 0
WEAKNESS: 0
FREQUENCY: 0
CHILLS: 0
AGITATION: 0
COUGH: 0

## 2024-06-06 ASSESSMENT — PAIN DESCRIPTION - PAIN TYPE: TYPE: ACUTE PAIN

## 2024-06-06 ASSESSMENT — LIFESTYLE VARIABLES
AUDIT-C TOTAL SCORE: 0
SKIP TO QUESTIONS 9-10: 1
HOW OFTEN DO YOU HAVE 6 OR MORE DRINKS ON ONE OCCASION: NEVER
AUDIT-C TOTAL SCORE: 0
PRESCIPTION_ABUSE_PAST_12_MONTHS: NO
HOW MANY STANDARD DRINKS CONTAINING ALCOHOL DO YOU HAVE ON A TYPICAL DAY: PATIENT DOES NOT DRINK
SUBSTANCE_ABUSE_PAST_12_MONTHS: NO
HOW OFTEN DO YOU HAVE A DRINK CONTAINING ALCOHOL: NEVER

## 2024-06-06 ASSESSMENT — PATIENT HEALTH QUESTIONNAIRE - PHQ9
1. LITTLE INTEREST OR PLEASURE IN DOING THINGS: NOT AT ALL
SUM OF ALL RESPONSES TO PHQ9 QUESTIONS 1 & 2: 0
2. FEELING DOWN, DEPRESSED OR HOPELESS: NOT AT ALL

## 2024-06-06 ASSESSMENT — PAIN DESCRIPTION - LOCATION: LOCATION: CHEST

## 2024-06-06 ASSESSMENT — PAIN SCALES - GENERAL
PAINLEVEL_OUTOF10: 4
PAINLEVEL_OUTOF10: 4

## 2024-06-06 ASSESSMENT — PAIN DESCRIPTION - DESCRIPTORS: DESCRIPTORS: PRESSURE

## 2024-06-06 ASSESSMENT — PAIN - FUNCTIONAL ASSESSMENT: PAIN_FUNCTIONAL_ASSESSMENT: 0-10

## 2024-06-06 NOTE — Clinical Note
Angioplasty of the middle left anterior descending lesion. Inflation 1: Pressure = 12 roshan; Duration = 7 sec. Inflation 2: Pressure = 12 roshan; Duration = 4 sec.

## 2024-06-06 NOTE — Clinical Note
Angioplasty of the middle left anterior descending lesion. Inflation 1: Pressure = 16 roshan; Duration = 8 sec. Inflation 2: Pressure = 20 roshan; Duration = 14 sec. Inflation 3: Pressure = 22 roshan; Duration = 8 sec. Inflation 4: Pressure = 22 roshan; Duration = 10 sec.

## 2024-06-06 NOTE — Clinical Note
Vessel: LAD. Guidewire inserted and used as the primary guidewire crossing the lesion. Through microcatheter

## 2024-06-07 ENCOUNTER — APPOINTMENT (OUTPATIENT)
Dept: UROLOGY | Facility: CLINIC | Age: 74
End: 2024-06-07
Payer: MEDICARE

## 2024-06-07 ENCOUNTER — APPOINTMENT (OUTPATIENT)
Dept: PRIMARY CARE | Facility: CLINIC | Age: 74
End: 2024-06-07
Payer: COMMERCIAL

## 2024-06-07 ENCOUNTER — APPOINTMENT (OUTPATIENT)
Dept: CARDIOLOGY | Facility: HOSPITAL | Age: 74
End: 2024-06-07
Payer: MEDICARE

## 2024-06-07 DIAGNOSIS — N18.30 STAGE 3 CHRONIC KIDNEY DISEASE, UNSPECIFIED WHETHER STAGE 3A OR 3B CKD (MULTI): Primary | ICD-10-CM

## 2024-06-07 LAB
ANION GAP SERPL CALC-SCNC: 15 MMOL/L (ref 10–20)
AORTIC VALVE MEAN GRADIENT: 3.7 MMHG
AORTIC VALVE PEAK VELOCITY: 1.37 M/S
ATRIAL RATE: 65 BPM
ATRIAL RATE: 68 BPM
AV PEAK GRADIENT: 7.6 MMHG
AVA (PEAK VEL): 2.79 CM2
AVA (VTI): 2.62 CM2
BUN SERPL-MCNC: 27 MG/DL (ref 6–23)
CALCIUM SERPL-MCNC: 9.1 MG/DL (ref 8.6–10.3)
CARDIAC TROPONIN I PNL SERPL HS: 2186 NG/L (ref 0–20)
CHLORIDE SERPL-SCNC: 102 MMOL/L (ref 98–107)
CO2 SERPL-SCNC: 25 MMOL/L (ref 21–32)
CREAT SERPL-MCNC: 1.59 MG/DL (ref 0.5–1.3)
EGFRCR SERPLBLD CKD-EPI 2021: 45 ML/MIN/1.73M*2
EJECTION FRACTION APICAL 4 CHAMBER: 62.2
ERYTHROCYTE [DISTWIDTH] IN BLOOD BY AUTOMATED COUNT: 13.1 % (ref 11.5–14.5)
GLUCOSE SERPL-MCNC: 180 MG/DL (ref 74–99)
HCT VFR BLD AUTO: 47.3 % (ref 41–52)
HGB BLD-MCNC: 17.1 G/DL (ref 13.5–17.5)
LEFT ATRIUM VOLUME AREA LENGTH INDEX BSA: 28.8 ML/M2
LEFT VENTRICLE INTERNAL DIMENSION DIASTOLE: 5.3 CM (ref 3.5–6)
LEFT VENTRICULAR OUTFLOW TRACT DIAMETER: 2.07 CM
LV EJECTION FRACTION BIPLANE: 55 %
MCH RBC QN AUTO: 29 PG (ref 26–34)
MCHC RBC AUTO-ENTMCNC: 36.2 G/DL (ref 32–36)
MCV RBC AUTO: 80 FL (ref 80–100)
MITRAL VALVE E/A RATIO: 0.85
MITRAL VALVE E/E' RATIO: 8.92
NRBC BLD-RTO: 0 /100 WBCS (ref 0–0)
P AXIS: 34 DEGREES
P AXIS: 40 DEGREES
PLATELET # BLD AUTO: 162 X10*3/UL (ref 150–450)
POTASSIUM SERPL-SCNC: 3.6 MMOL/L (ref 3.5–5.3)
PR INTERVAL: 204 MS
PR INTERVAL: 207 MS
Q ONSET: 249 MS
Q ONSET: 251 MS
QRS COUNT: 10 BEATS
QRS COUNT: 11 BEATS
QRS DURATION: 126 MS
QRS DURATION: 126 MS
QT INTERVAL: 432 MS
QT INTERVAL: 440 MS
QTC CALCULATION(BAZETT): 446 MS
QTC CALCULATION(BAZETT): 469 MS
QTC FREDERICIA: 441 MS
QTC FREDERICIA: 459 MS
R AXIS: -47 DEGREES
R AXIS: -55 DEGREES
RBC # BLD AUTO: 5.9 X10*6/UL (ref 4.5–5.9)
RIGHT VENTRICLE FREE WALL PEAK S': 15.07 CM/S
RIGHT VENTRICLE PEAK SYSTOLIC PRESSURE: 23.8 MMHG
SODIUM SERPL-SCNC: 138 MMOL/L (ref 136–145)
T AXIS: 81 DEGREES
T AXIS: 96 DEGREES
T OFFSET: 467 MS
T OFFSET: 469 MS
TRICUSPID ANNULAR PLANE SYSTOLIC EXCURSION: 2.2 CM
UFH PPP CHRO-ACNC: 0.4 IU/ML
UFH PPP CHRO-ACNC: 0.5 IU/ML
VENTRICULAR RATE: 64 BPM
VENTRICULAR RATE: 68 BPM
WBC # BLD AUTO: 8.1 X10*3/UL (ref 4.4–11.3)

## 2024-06-07 PROCEDURE — 99152 MOD SED SAME PHYS/QHP 5/>YRS: CPT | Performed by: STUDENT IN AN ORGANIZED HEALTH CARE EDUCATION/TRAINING PROGRAM

## 2024-06-07 PROCEDURE — 99223 1ST HOSP IP/OBS HIGH 75: CPT | Performed by: STUDENT IN AN ORGANIZED HEALTH CARE EDUCATION/TRAINING PROGRAM

## 2024-06-07 PROCEDURE — 93306 TTE W/DOPPLER COMPLETE: CPT

## 2024-06-07 PROCEDURE — 84484 ASSAY OF TROPONIN QUANT: CPT | Performed by: STUDENT IN AN ORGANIZED HEALTH CARE EDUCATION/TRAINING PROGRAM

## 2024-06-07 PROCEDURE — 2500000004 HC RX 250 GENERAL PHARMACY W/ HCPCS (ALT 636 FOR OP/ED): Performed by: STUDENT IN AN ORGANIZED HEALTH CARE EDUCATION/TRAINING PROGRAM

## 2024-06-07 PROCEDURE — 2500000005 HC RX 250 GENERAL PHARMACY W/O HCPCS: Performed by: STUDENT IN AN ORGANIZED HEALTH CARE EDUCATION/TRAINING PROGRAM

## 2024-06-07 PROCEDURE — C1769 GUIDE WIRE: HCPCS | Performed by: STUDENT IN AN ORGANIZED HEALTH CARE EDUCATION/TRAINING PROGRAM

## 2024-06-07 PROCEDURE — 80048 BASIC METABOLIC PNL TOTAL CA: CPT | Performed by: STUDENT IN AN ORGANIZED HEALTH CARE EDUCATION/TRAINING PROGRAM

## 2024-06-07 PROCEDURE — 2550000001 HC RX 255 CONTRASTS: Performed by: STUDENT IN AN ORGANIZED HEALTH CARE EDUCATION/TRAINING PROGRAM

## 2024-06-07 PROCEDURE — 92928 PRQ TCAT PLMT NTRAC ST 1 LES: CPT | Performed by: STUDENT IN AN ORGANIZED HEALTH CARE EDUCATION/TRAINING PROGRAM

## 2024-06-07 PROCEDURE — 85520 HEPARIN ASSAY: CPT | Mod: 91 | Performed by: EMERGENCY MEDICINE

## 2024-06-07 PROCEDURE — C1894 INTRO/SHEATH, NON-LASER: HCPCS | Performed by: STUDENT IN AN ORGANIZED HEALTH CARE EDUCATION/TRAINING PROGRAM

## 2024-06-07 PROCEDURE — 2020000001 HC ICU ROOM DAILY

## 2024-06-07 PROCEDURE — C1760 CLOSURE DEV, VASC: HCPCS | Performed by: STUDENT IN AN ORGANIZED HEALTH CARE EDUCATION/TRAINING PROGRAM

## 2024-06-07 PROCEDURE — 2500000001 HC RX 250 WO HCPCS SELF ADMINISTERED DRUGS (ALT 637 FOR MEDICARE OP): Performed by: STUDENT IN AN ORGANIZED HEALTH CARE EDUCATION/TRAINING PROGRAM

## 2024-06-07 PROCEDURE — 93306 TTE W/DOPPLER COMPLETE: CPT | Performed by: STUDENT IN AN ORGANIZED HEALTH CARE EDUCATION/TRAINING PROGRAM

## 2024-06-07 PROCEDURE — 93458 L HRT ARTERY/VENTRICLE ANGIO: CPT | Performed by: STUDENT IN AN ORGANIZED HEALTH CARE EDUCATION/TRAINING PROGRAM

## 2024-06-07 PROCEDURE — 99153 MOD SED SAME PHYS/QHP EA: CPT | Performed by: STUDENT IN AN ORGANIZED HEALTH CARE EDUCATION/TRAINING PROGRAM

## 2024-06-07 PROCEDURE — 2500000002 HC RX 250 W HCPCS SELF ADMINISTERED DRUGS (ALT 637 FOR MEDICARE OP, ALT 636 FOR OP/ED): Mod: MUE | Performed by: NURSE PRACTITIONER

## 2024-06-07 PROCEDURE — 4A023N7 MEASUREMENT OF CARDIAC SAMPLING AND PRESSURE, LEFT HEART, PERCUTANEOUS APPROACH: ICD-10-PCS | Performed by: STUDENT IN AN ORGANIZED HEALTH CARE EDUCATION/TRAINING PROGRAM

## 2024-06-07 PROCEDURE — C1887 CATHETER, GUIDING: HCPCS | Performed by: STUDENT IN AN ORGANIZED HEALTH CARE EDUCATION/TRAINING PROGRAM

## 2024-06-07 PROCEDURE — 85347 COAGULATION TIME ACTIVATED: CPT | Mod: 91

## 2024-06-07 PROCEDURE — C1874 STENT, COATED/COV W/DEL SYS: HCPCS | Performed by: STUDENT IN AN ORGANIZED HEALTH CARE EDUCATION/TRAINING PROGRAM

## 2024-06-07 PROCEDURE — 99233 SBSQ HOSP IP/OBS HIGH 50: CPT | Performed by: INTERNAL MEDICINE

## 2024-06-07 PROCEDURE — 2500000002 HC RX 250 W HCPCS SELF ADMINISTERED DRUGS (ALT 637 FOR MEDICARE OP, ALT 636 FOR OP/ED): Performed by: STUDENT IN AN ORGANIZED HEALTH CARE EDUCATION/TRAINING PROGRAM

## 2024-06-07 PROCEDURE — B2111ZZ FLUOROSCOPY OF MULTIPLE CORONARY ARTERIES USING LOW OSMOLAR CONTRAST: ICD-10-PCS | Performed by: STUDENT IN AN ORGANIZED HEALTH CARE EDUCATION/TRAINING PROGRAM

## 2024-06-07 PROCEDURE — 2500000004 HC RX 250 GENERAL PHARMACY W/ HCPCS (ALT 636 FOR OP/ED): Performed by: NURSE PRACTITIONER

## 2024-06-07 PROCEDURE — 85027 COMPLETE CBC AUTOMATED: CPT | Performed by: STUDENT IN AN ORGANIZED HEALTH CARE EDUCATION/TRAINING PROGRAM

## 2024-06-07 PROCEDURE — 2720000007 HC OR 272 NO HCPCS: Performed by: STUDENT IN AN ORGANIZED HEALTH CARE EDUCATION/TRAINING PROGRAM

## 2024-06-07 PROCEDURE — C9600 PERC DRUG-EL COR STENT SING: HCPCS | Performed by: STUDENT IN AN ORGANIZED HEALTH CARE EDUCATION/TRAINING PROGRAM

## 2024-06-07 PROCEDURE — 36415 COLL VENOUS BLD VENIPUNCTURE: CPT | Performed by: EMERGENCY MEDICINE

## 2024-06-07 PROCEDURE — 2780000003 HC OR 278 NO HCPCS: Performed by: STUDENT IN AN ORGANIZED HEALTH CARE EDUCATION/TRAINING PROGRAM

## 2024-06-07 PROCEDURE — C1725 CATH, TRANSLUMIN NON-LASER: HCPCS | Performed by: STUDENT IN AN ORGANIZED HEALTH CARE EDUCATION/TRAINING PROGRAM

## 2024-06-07 PROCEDURE — 027034Z DILATION OF CORONARY ARTERY, ONE ARTERY WITH DRUG-ELUTING INTRALUMINAL DEVICE, PERCUTANEOUS APPROACH: ICD-10-PCS | Performed by: STUDENT IN AN ORGANIZED HEALTH CARE EDUCATION/TRAINING PROGRAM

## 2024-06-07 PROCEDURE — 36415 COLL VENOUS BLD VENIPUNCTURE: CPT | Performed by: STUDENT IN AN ORGANIZED HEALTH CARE EDUCATION/TRAINING PROGRAM

## 2024-06-07 DEVICE — STENT ONYXNG27530UX ONYX 2.75X30RX
Type: IMPLANTABLE DEVICE | Site: CORONARY | Status: FUNCTIONAL
Brand: ONYX FRONTIER™

## 2024-06-07 RX ORDER — NITROGLYCERIN 40 MG/100ML
INJECTION INTRAVENOUS AS NEEDED
Status: DISCONTINUED | OUTPATIENT
Start: 2024-06-07 | End: 2024-06-07 | Stop reason: HOSPADM

## 2024-06-07 RX ORDER — FENTANYL CITRATE 50 UG/ML
INJECTION, SOLUTION INTRAMUSCULAR; INTRAVENOUS AS NEEDED
Status: DISCONTINUED | OUTPATIENT
Start: 2024-06-07 | End: 2024-06-07 | Stop reason: HOSPADM

## 2024-06-07 RX ORDER — LORAZEPAM 0.5 MG/1
0.5 TABLET ORAL ONCE
Status: COMPLETED | OUTPATIENT
Start: 2024-06-07 | End: 2024-06-07

## 2024-06-07 RX ORDER — MORPHINE SULFATE 2 MG/ML
2 INJECTION, SOLUTION INTRAMUSCULAR; INTRAVENOUS EVERY 6 HOURS PRN
Status: DISCONTINUED | OUTPATIENT
Start: 2024-06-07 | End: 2024-06-08 | Stop reason: HOSPADM

## 2024-06-07 RX ORDER — ATORVASTATIN CALCIUM 40 MG/1
80 TABLET, FILM COATED ORAL DAILY
Status: DISCONTINUED | OUTPATIENT
Start: 2024-06-08 | End: 2024-06-08 | Stop reason: HOSPADM

## 2024-06-07 RX ORDER — SODIUM CHLORIDE 9 MG/ML
1 INJECTION, SOLUTION INTRAVENOUS CONTINUOUS
Status: ACTIVE | OUTPATIENT
Start: 2024-06-07 | End: 2024-06-08

## 2024-06-07 RX ORDER — SODIUM CHLORIDE 9 MG/ML
100 INJECTION, SOLUTION INTRAVENOUS CONTINUOUS
Status: DISCONTINUED | OUTPATIENT
Start: 2024-06-07 | End: 2024-06-07

## 2024-06-07 RX ORDER — LIDOCAINE HYDROCHLORIDE 20 MG/ML
INJECTION, SOLUTION INFILTRATION; PERINEURAL AS NEEDED
Status: DISCONTINUED | OUTPATIENT
Start: 2024-06-07 | End: 2024-06-07 | Stop reason: HOSPADM

## 2024-06-07 RX ORDER — SODIUM CHLORIDE 9 MG/ML
1000 INJECTION, SOLUTION INTRAVENOUS ONCE AS NEEDED
Status: DISCONTINUED | OUTPATIENT
Start: 2024-06-07 | End: 2024-06-08 | Stop reason: HOSPADM

## 2024-06-07 RX ORDER — HEPARIN SODIUM 1000 [USP'U]/ML
INJECTION, SOLUTION INTRAVENOUS; SUBCUTANEOUS AS NEEDED
Status: DISCONTINUED | OUTPATIENT
Start: 2024-06-07 | End: 2024-06-07 | Stop reason: HOSPADM

## 2024-06-07 RX ORDER — MIDAZOLAM HYDROCHLORIDE 1 MG/ML
INJECTION, SOLUTION INTRAMUSCULAR; INTRAVENOUS AS NEEDED
Status: DISCONTINUED | OUTPATIENT
Start: 2024-06-07 | End: 2024-06-07 | Stop reason: HOSPADM

## 2024-06-07 RX ORDER — HYDROXYZINE HYDROCHLORIDE 25 MG/1
25 TABLET, FILM COATED ORAL EVERY 8 HOURS PRN
Status: DISCONTINUED | OUTPATIENT
Start: 2024-06-07 | End: 2024-06-08 | Stop reason: HOSPADM

## 2024-06-07 RX ADMIN — TRIAMTERENE AND HYDROCHLOROTHIAZIDE 1 TABLET: 37.5; 25 TABLET ORAL at 08:40

## 2024-06-07 RX ADMIN — ATORVASTATIN CALCIUM 40 MG: 40 TABLET, FILM COATED ORAL at 08:40

## 2024-06-07 RX ADMIN — ASPIRIN 81 MG: 81 TABLET, COATED ORAL at 08:41

## 2024-06-07 RX ADMIN — FINASTERIDE 5 MG: 5 TABLET, FILM COATED ORAL at 08:40

## 2024-06-07 RX ADMIN — TICAGRELOR 90 MG: 90 TABLET ORAL at 20:57

## 2024-06-07 RX ADMIN — SODIUM CHLORIDE 1 ML/KG/HR: 9 INJECTION, SOLUTION INTRAVENOUS at 23:03

## 2024-06-07 RX ADMIN — DILTIAZEM HYDROCHLORIDE 120 MG: 120 CAPSULE, COATED, EXTENDED RELEASE ORAL at 08:40

## 2024-06-07 RX ADMIN — PANTOPRAZOLE SODIUM 40 MG: 40 TABLET, DELAYED RELEASE ORAL at 06:19

## 2024-06-07 RX ADMIN — SODIUM CHLORIDE 100 ML/HR: 9 INJECTION, SOLUTION INTRAVENOUS at 11:00

## 2024-06-07 RX ADMIN — METOPROLOL TARTRATE 25 MG: 25 TABLET, FILM COATED ORAL at 08:40

## 2024-06-07 RX ADMIN — TICAGRELOR 90 MG: 90 TABLET ORAL at 08:41

## 2024-06-07 RX ADMIN — LEVOTHYROXINE SODIUM 137 MCG: 0.03 TABLET ORAL at 06:18

## 2024-06-07 RX ADMIN — LORAZEPAM 0.5 MG: 0.5 TABLET ORAL at 21:28

## 2024-06-07 RX ADMIN — TAMSULOSIN HYDROCHLORIDE 0.4 MG: 0.4 CAPSULE ORAL at 08:40

## 2024-06-07 ASSESSMENT — COGNITIVE AND FUNCTIONAL STATUS - GENERAL
DAILY ACTIVITIY SCORE: 24
MOBILITY SCORE: 24

## 2024-06-07 ASSESSMENT — ACTIVITIES OF DAILY LIVING (ADL)
FEEDING YOURSELF: INDEPENDENT
WALKS IN HOME: INDEPENDENT
TOILETING: INDEPENDENT
ADEQUATE_TO_COMPLETE_ADL: YES
JUDGMENT_ADEQUATE_SAFELY_COMPLETE_DAILY_ACTIVITIES: YES
BATHING: INDEPENDENT
HEARING - RIGHT EAR: FUNCTIONAL
ASSISTIVE_DEVICE: EYEGLASSES
HEARING - LEFT EAR: FUNCTIONAL
GROOMING: INDEPENDENT
DRESSING YOURSELF: INDEPENDENT
PATIENT'S MEMORY ADEQUATE TO SAFELY COMPLETE DAILY ACTIVITIES?: YES

## 2024-06-07 ASSESSMENT — COLUMBIA-SUICIDE SEVERITY RATING SCALE - C-SSRS
6. HAVE YOU EVER DONE ANYTHING, STARTED TO DO ANYTHING, OR PREPARED TO DO ANYTHING TO END YOUR LIFE?: NO
1. IN THE PAST MONTH, HAVE YOU WISHED YOU WERE DEAD OR WISHED YOU COULD GO TO SLEEP AND NOT WAKE UP?: NO
2. HAVE YOU ACTUALLY HAD ANY THOUGHTS OF KILLING YOURSELF?: NO

## 2024-06-07 ASSESSMENT — PAIN SCALES - GENERAL
PAINLEVEL_OUTOF10: 0 - NO PAIN
PAINLEVEL_OUTOF10: 1
PAINLEVEL_OUTOF10: 0 - NO PAIN

## 2024-06-07 ASSESSMENT — PAIN - FUNCTIONAL ASSESSMENT
PAIN_FUNCTIONAL_ASSESSMENT: 0-10
PAIN_FUNCTIONAL_ASSESSMENT: 0-10

## 2024-06-07 NOTE — ED TRIAGE NOTES
Pt reports chest pressure starting approx 20min ago, denies shortness of breath. Denies any cardiac hx

## 2024-06-07 NOTE — H&P
"History Obtained From: Pt and wife    History Of Present Illness:  Alfonzo Baldwin is a 74 y.o. male with PMHx s/f bradycardia, HTN, HLD, DM2, CKD, hypothyroidism, BPH presenting with chest pain.  Patient was in a hot tub tonight when he started having midsternal chest pain that he describes as a \"tightness\" in his chest.  Chest pain radiated down his right arm.  Did not remit until he reached the emergency room, but now he says the chest pain has mostly resolved.  He did have some shortness of breath with it, but no ERAZO. He has no cardiac history.  He is a former smoker and has quit about 30 years ago.  No nausea, vomiting, lightheadedness, dizziness, syncope, abdominal pain, bowel or bladder issues, or other symptoms.  No history of intermittent chest pain like this.    ED Course (Summary):   Vitals on presentation: 97.4 F, 64 bpm, 16 rr, 162/83, 96% on RA  Labs: CMP glu 175, K 3.1, Cr 1.65  Mag 1.91  BNP 74  Trop 836  INR 1.0  CBC WBC 6.9, Hg 17.6, Plt 165  Imaging: CXR - no evidence of acute cardiopulmonary process.  EKG - Sinus rhythm at 68 bpm, new LBBB, No ST-T changes.  Interventions:  mg, Heparin bolus and drip started, Klor-con 30 meq given. Cardiology was consulted and EKG was reviewed, but given his symptoms have mostly resolved at this pt, recommended admission and will treat medically for now.    ED Course:  ED Course as of 06/06/24 2240   Thu Jun 06, 2024 2143 Patient's EKG was just located.  Sinus rhythm average rate 64, first-degree AV block with a NJ interval 204 ms, occasional PVC, left bundle branch block present, negative Sgarbossa's criteria.  [WJ]   2143 Troponin I, High Sensitivity(!!): 836  Heparin ordered, EKG transmitted to cardiologist.  The patient currently has no  Chest pain at this time. [WJ]   2159 Spoke to Dr Allen.  Agrees with current plan of heparinization, [facility] admission [WJ]   2202 Repeat twelve-lead EKG shows sinus rhythm, ventricular rate of 68, left atrial " enlargement, first-degree AV block with a CT interval 207 ms, left bundle branch block present, normal QT, no Sgarbossa's criteria not significantly changed from prior EKG today [WJ]      ED Course User Index  [WJ] Sam López DO         Diagnoses as of 06/06/24 2240   NSTEMI (non-ST elevated myocardial infarction) (Multi)     Relevant Results  Results for orders placed or performed during the hospital encounter of 06/06/24 (from the past 24 hour(s))   CBC and Auto Differential   Result Value Ref Range    WBC 6.9 4.4 - 11.3 x10*3/uL    nRBC 0.0 0.0 - 0.0 /100 WBCs    RBC 6.02 (H) 4.50 - 5.90 x10*6/uL    Hemoglobin 17.6 (H) 13.5 - 17.5 g/dL    Hematocrit 48.2 41.0 - 52.0 %    MCV 80 80 - 100 fL    MCH 29.2 26.0 - 34.0 pg    MCHC 36.5 (H) 32.0 - 36.0 g/dL    RDW 13.0 11.5 - 14.5 %    Platelets 165 150 - 450 x10*3/uL    Neutrophils % 52.4 40.0 - 80.0 %    Immature Granulocytes %, Automated 0.3 0.0 - 0.9 %    Lymphocytes % 34.8 13.0 - 44.0 %    Monocytes % 8.3 2.0 - 10.0 %    Eosinophils % 3.2 0.0 - 6.0 %    Basophils % 1.0 0.0 - 2.0 %    Neutrophils Absolute 3.59 1.60 - 5.50 x10*3/uL    Immature Granulocytes Absolute, Automated 0.02 0.00 - 0.50 x10*3/uL    Lymphocytes Absolute 2.39 0.80 - 3.00 x10*3/uL    Monocytes Absolute 0.57 0.05 - 0.80 x10*3/uL    Eosinophils Absolute 0.22 0.00 - 0.40 x10*3/uL    Basophils Absolute 0.07 0.00 - 0.10 x10*3/uL   Protime-INR   Result Value Ref Range    Protime 11.4 9.8 - 12.8 seconds    INR 1.0 0.9 - 1.1   Comprehensive Metabolic Panel   Result Value Ref Range    Glucose 175 (H) 74 - 99 mg/dL    Sodium 137 136 - 145 mmol/L    Potassium 3.1 (L) 3.5 - 5.3 mmol/L    Chloride 102 98 - 107 mmol/L    Bicarbonate 24 21 - 32 mmol/L    Anion Gap 14 10 - 20 mmol/L    Urea Nitrogen 28 (H) 6 - 23 mg/dL    Creatinine 1.65 (H) 0.50 - 1.30 mg/dL    eGFR 43 (L) >60 mL/min/1.73m*2    Calcium 9.3 8.6 - 10.3 mg/dL    Albumin 4.4 3.4 - 5.0 g/dL    Alkaline Phosphatase 47 33 - 136 U/L    Total Protein  7.2 6.4 - 8.2 g/dL    AST 26 9 - 39 U/L    Bilirubin, Total 0.6 0.0 - 1.2 mg/dL    ALT 34 10 - 52 U/L   Magnesium   Result Value Ref Range    Magnesium 1.91 1.60 - 2.40 mg/dL   B-Type Natriuretic Peptide   Result Value Ref Range    BNP 74 0 - 99 pg/mL   Troponin I, High Sensitivity, Initial   Result Value Ref Range    Troponin I, High Sensitivity 836 (HH) 0 - 20 ng/L      XR chest 1 view    Result Date: 6/6/2024  Interpreted By:  Abran Maki, STUDY: XR CHEST 1 VIEW;  6/6/2024 9:09 pm   INDICATION: Signs/Symptoms:Chest pain.   COMPARISON: 11/15/2012   ACCESSION NUMBER(S): [id number]   ORDERING CLINICIAN: JA GARCIAS   FINDINGS:         CARDIOMEDIASTINAL SILHOUETTE: Cardiomediastinal silhouette is normal in size and configuration.   LUNGS: Lungs are clear.   ABDOMEN: No remarkable upper abdominal findings.   BONES: No acute osseous changes.       1.  No evidence of acute cardiopulmonary process.       MACRO: None   Signed by: Abran Maki 6/6/2024 9:14 PM Dictation workstation:   [id number]     Scheduled medications:  [START ON 6/7/2024] aspirin, 81 mg, oral, Daily  [START ON 6/7/2024] atorvastatin, 40 mg, oral, Daily  [START ON 6/7/2024] dilTIAZem CD, 120 mg, oral, Daily  [START ON 6/7/2024] finasteride, 5 mg, oral, Daily  [START ON 6/7/2024] levothyroxine, 137 mcg, oral, Daily  metoprolol tartrate, 25 mg, oral, BID  [START ON 6/7/2024] pantoprazole, 40 mg, oral, Daily before breakfast   Or  [START ON 6/7/2024] pantoprazole, 40 mg, intravenous, Daily before breakfast  [START ON 6/7/2024] tamsulosin, 0.4 mg, oral, Daily  ticagrelor, 180 mg, oral, Once   Followed by  [START ON 6/7/2024] ticagrelor, 90 mg, oral, BID  [START ON 6/7/2024] triamterene-hydrochlorothiazid, 1 tablet, oral, Daily      Continuous medications:  heparin, 0-4,000 Units/hr, Last Rate: 900 Units/hr (06/06/24 0518)      PRN medications:  PRN medications: bisacodyl, bisacodyl, guaiFENesin, heparin, melatonin, ondansetron **OR**  ondansetron      Past Medical History  He has a past medical history of Personal history of other diseases of the circulatory system.    Surgical History  He has a past surgical history that includes Other surgical history (09/25/2020); Other surgical history (06/03/2022); Other surgical history (06/03/2022); and Other surgical history (06/03/2022).     Social History  He reports that he has quit smoking. His smoking use included cigarettes. He has never used smokeless tobacco. He reports that he does not currently use alcohol. He reports that he does not use drugs.    Family History  Family History   Problem Relation Name Age of Onset    Heart disease Mother      Prostatitis Father      Other (Cardiac Disorder) Other family hx     Diabetes Other family hx     Other (Malignant Neoplasm of Prostate) Other family hx     Hypertension Other family hx         HTN        Allergies  Ace inhibitors and Valsartan    Code Status  Full Code     Review of Systems   Constitutional:  Negative for activity change, appetite change, chills, diaphoresis, fatigue and fever.   HENT:  Negative for congestion, ear pain, rhinorrhea, sinus pain and sore throat.    Respiratory:  Positive for shortness of breath. Negative for apnea, cough, chest tightness, wheezing and stridor.    Cardiovascular:  Positive for chest pain. Negative for palpitations and leg swelling.   Gastrointestinal:  Negative for abdominal distention, abdominal pain, constipation, diarrhea, nausea and vomiting.   Genitourinary:  Negative for difficulty urinating, dysuria, flank pain, frequency, hematuria and urgency.   Musculoskeletal:  Negative for arthralgias, back pain, gait problem, joint swelling and myalgias.   Skin:  Negative for color change, pallor, rash and wound.   Neurological:  Negative for dizziness, syncope, weakness, light-headedness, numbness and headaches.   Psychiatric/Behavioral:  Negative for agitation, behavioral problems, confusion and decreased  concentration. The patient is not nervous/anxious.    All other systems reviewed and are negative.      Last Recorded Vitals  /81   Pulse 68   Temp 36.3 °C (97.4 °F) (Skin)   Resp 16   Wt 74.8 kg (165 lb)   SpO2 96%      Physical Exam  Vitals and nursing note reviewed.   Constitutional:       General: He is not in acute distress.     Appearance: Normal appearance. He is normal weight. He is not ill-appearing or toxic-appearing.   HENT:      Head: Normocephalic and atraumatic.      Mouth/Throat:      Mouth: Mucous membranes are moist.      Pharynx: No posterior oropharyngeal erythema.   Eyes:      Extraocular Movements: Extraocular movements intact.      Pupils: Pupils are equal, round, and reactive to light.   Cardiovascular:      Rate and Rhythm: Normal rate and regular rhythm.      Heart sounds: Normal heart sounds. No murmur heard.     No friction rub. No gallop.   Pulmonary:      Effort: Pulmonary effort is normal. No respiratory distress.      Breath sounds: Normal breath sounds. No stridor. No wheezing, rhonchi or rales.   Abdominal:      General: Abdomen is flat. Bowel sounds are normal. There is no distension.      Palpations: Abdomen is soft. There is no mass.      Tenderness: There is no abdominal tenderness. There is no right CVA tenderness, left CVA tenderness, guarding or rebound.   Musculoskeletal:         General: No swelling, tenderness, deformity or signs of injury. Normal range of motion.      Right lower leg: No edema.      Left lower leg: No edema.   Skin:     General: Skin is warm and dry.      Coloration: Skin is not jaundiced or pale.      Findings: No bruising, erythema, lesion or rash.   Neurological:      General: No focal deficit present.      Mental Status: He is alert and oriented to person, place, and time. Mental status is at baseline.      Cranial Nerves: No cranial nerve deficit.      Sensory: No sensory deficit.      Motor: No weakness.   Psychiatric:         Mood and  Affect: Mood normal.         Behavior: Behavior normal.         Thought Content: Thought content normal.         Judgment: Judgment normal.         Assessment/Plan   Principal Problem:    NSTEMI (non-ST elevated myocardial infarction) (Multi)  Active Problems:    Essential hypertension    Hypothyroidism    Mixed hyperlipidemia    Pure hyperglyceridemia    Stage 3 chronic kidney disease (Multi)    DM2 (diabetes mellitus, type 2) (Multi)    Hypokalemia    Hyperglycemia    Plan:  Admit to stepdown.    NSTEMI:  Cardiology consulted  ASA + Brilinta  Heparin drip  Lopressor 25 mg PO BID started  Home Lipitor    Hyopkalemia:  Replete and recheck in AM.    CKD 3:  Cr 1.65, at baseline    HTN, hx SVT:  Home Cardizem, Maxzide-25  Monitor Bps given he is only multiple bp meds with the added Lopressor.    Hypothyroidism:  Home Synthroid    BPH:  Home Flomax, Proscar    DVT ppx: Heparin drip  Carb controlled diet  Full code per discussion with pt and wife.       Kevin Babb DO    Dragon dictation software was used to dictate this note and thus there may be minor errors in translation/transcription including garbled speech or misspellings. Please contact for clarification if needed.

## 2024-06-07 NOTE — CARE PLAN
The patient's goals for the shift include  stay informed    The clinical goals for the shift include  patient will be free of chest pain by end of the shift    Over the shift, the patient did not make progress toward the following goals. Barriers to progression include understanding. Recommendations to address these barriers include education.

## 2024-06-07 NOTE — POST-PROCEDURE NOTE
Physician Transition of Care Summary  Invasive Cardiovascular Lab    Procedure Date: 6/7/2024  Attending:    Ho Burns - Primary  Resident/Fellow/Other Assistant: Surgeons and Role:  * No surgeons found with a matching role *    Indications:   Pre-op Diagnosis     * NSTEMI (non-ST elevated myocardial infarction) (Multi) [I21.4]    Post-procedure diagnosis:   Post-op Diagnosis     * NSTEMI (non-ST elevated myocardial infarction) (Multi) [I21.4]    Procedure(s):     * Left Heart Cath, With LV    * PCI SUSHANT Stent- Coronary      Procedure Findings:   Mid LAD lesion 99%, 50% diagonal disease (medical therapy for tx)   Please see full report for details     Description of the Procedure:   [facility]   Successful PCI of the mid LAD     Complications:   None     Stents/Implants:   Implants       Stent    Stent, [place] Sushant, 2.75 X 30rx - [id number] - Implanted        Inventory item: STENT, ABA FRONTIER SUSHANT, 2.75 X 30RX Model/Cat number: [id number]    : [employer] Lot number: [id number]    Device identifier: [id number]        As of 6/7/2024       Status: Implanted                              Anticoagulation/Antiplatelet Plan:   DAPT for 12 months uninterrupted     Estimated Blood Loss:   5 mL    Anesthesia: Moderate Sedation Anesthesia Staff: No anesthesia staff entered.    Any Specimen(s) Removed:   No specimens collected during this procedure.    Disposition:   ICU     Recs:   DAPT for 12 months interrupted   Atorvastatin increased to 80 mg daily   Discontinue lopressor/holding Cardizem due to bradycardia   Cardiac Rehab   Cardiology follow up in 1-2 weeks s/p discharge          Electronically signed by: MERCY Hyman, 6/7/2024 5:07 PM

## 2024-06-07 NOTE — CONSULTS
Inpatient consult to Cardiology  Consult performed by: Ludwin Burns MD  Consult ordered by: Kevin Babb DO  Reason for consult: NSTEMI  Assessment/Recommendations: See full note         History Of Present Illness:    Alfonzo Baldwin is a 74 y.o. male with past medical history of bradycardia, hypertension, hyperlipidemia, diabetes, CKD, hypothyroidism who presented to the hospital due to chest pain.  Patient reports that yesterday he started having chest tightness with radiation to his arm.  Due to this pain he decided to come to the hospital.  Pain has resolved in the hospital.  His cardiac enzymes were found to be elevated.  Cardiology was consulted for the same.  He had prior stress test and was following with cardiology in the past.  Prior stress test was unremarkable for ischemia.    On arrival to hospital he was hemodynamically stable with blood pressure 162/83 and heart rate of 64 bpm.  Currently on telemetry monitoring heart rate is in 40s to 50s sinus rhythm.  Patient reports that this bradycardia is chronic.    Patient was started on aspirin 325 mg and heparin was started with bolus.  Brilinta was also started on this patient.    Currently patient denies any chest pain.    His creatinine is 1.59 with a GFR of 45.  Chest x-ray with no acute cardiopulmonary process.    EKG with normal sinus rhythm with left bundle branch block, possible inferior infarct.         Last Recorded Vitals:  Vitals:    06/07/24 0000 06/07/24 0216 06/07/24 0420 06/07/24 0820   BP: 149/86 114/68 (!) 187/94 119/68   Pulse: 71 51 88 (!) 42   Resp: 16 16 16 16   Temp:       TempSrc:       SpO2: 97% 96% 98% 98%   Weight:       Height:           Last Labs:  CBC - 6/7/2024:  6:14 AM  8.1 17.1 162    47.3      CMP - 6/7/2024:  6:14 AM  9.1 7.2 26 --- 0.6   _ 4.4 34 47      PTT - No results in last year.  1.0   11.4 _     Troponin I, High Sensitivity   Date/Time Value Ref Range Status   06/07/2024 12:20 AM 2,186 (HH) 0 - 20 ng/L Final     " Comment:     Previous result verified on 6/6/2024 2133 on specimen/case [id number] called with component TRPHS for procedure Troponin I, High Sensitivity, Initial with value 836 ng/L.   06/06/2024 09:59 PM 1,204 (HH) 0 - 20 ng/L Final     Comment:     Previous result verified on 6/6/2024 2133 on specimen/case [id number] called with component TRPHS for procedure Troponin I, High Sensitivity, Initial with value 836 ng/L.   06/06/2024 09:00  (HH) 0 - 20 ng/L Final     BNP   Date/Time Value Ref Range Status   06/06/2024 09:00 PM 74 0 - 99 pg/mL Final     POC HEMOGLOBIN A1c   Date/Time Value Ref Range Status   03/08/2024 09:24 AM 7.2 (A) 4.2 - 6.5 % Final   12/08/2023 09:28 AM 7.8 (A) 4.2 - 6.5 % Final     VLDL   Date/Time Value Ref Range Status   03/22/2022 08:09 AM 54 (H) 0 - 40 mg/dL Final   01/18/2021 10:26 AM 34 0 - 40 mg/dL Final   06/19/2020 10:11 AM 25 0 - 40 mg/dL Final      Last I/O:  No intake/output data recorded.    Past Cardiology Tests (Last 3 Years):  EKG:  Electrocardiogram, 12-lead PRN ACS symptoms 06/06/2024 (Preliminary)      ECG 12 Lead 06/06/2024 (Preliminary)    Echo:  No results found for this or any previous visit from the past 1095 days.    Ejection Fractions:  No results found for: \"EF\"  Cath:  No results found for this or any previous visit from the past 1095 days.    Stress Test:  No results found for this or any previous visit from the past 1095 days.    Cardiac Imaging:  No results found for this or any previous visit from the past 1095 days.      Past Medical History:  He has a past medical history of Personal history of other diseases of the circulatory system.    Past Surgical History:  He has a past surgical history that includes Other surgical history (09/25/2020); Other surgical history (06/03/2022); Other surgical history (06/03/2022); and Other surgical history (06/03/2022).      Social History:  He reports that he has quit smoking. His smoking use included " cigarettes. He has never used smokeless tobacco. He reports that he does not currently use alcohol. He reports that he does not use drugs.    Family History:  Family History   Problem Relation Name Age of Onset    Heart disease Mother      Prostatitis Father      Other (Cardiac Disorder) Other family hx     Diabetes Other family hx     Other (Malignant Neoplasm of Prostate) Other family hx     Hypertension Other family hx         HTN        Allergies:  Ace inhibitors and Valsartan    Inpatient Medications:  Scheduled medications   Medication Dose Route Frequency    aspirin  81 mg oral Daily    atorvastatin  40 mg oral Daily    dilTIAZem CD  120 mg oral Daily    finasteride  5 mg oral Daily    levothyroxine  137 mcg oral Daily    metoprolol tartrate  25 mg oral BID    pantoprazole  40 mg oral Daily before breakfast    Or    pantoprazole  40 mg intravenous Daily before breakfast    tamsulosin  0.4 mg oral Daily    ticagrelor  90 mg oral BID    triamterene-hydrochlorothiazid  1 tablet oral Daily     PRN medications   Medication    bisacodyl    bisacodyl    guaiFENesin    heparin    melatonin    ondansetron    Or    ondansetron     Continuous Medications   Medication Dose Last Rate    heparin  0-4,000 Units/hr 900 Units/hr (06/07/24 0020)    sodium chloride 0.9%  100 mL/hr 100 mL/hr (06/07/24 1100)     Outpatient Medications:  Current Outpatient Medications   Medication Instructions    atorvastatin (LIPITOR) 40 mg, oral, Daily    blood sugar diagnostic (TRUETEST TEST STRIPS MISC)     Calcium 500 With D 500 mg-10 mcg (400 unit) tablet 1 tablet, oral, 2 times daily    dapagliflozin propanediol (FARXIGA) 10 mg, oral, Daily    dilTIAZem XR (DILACOR XR) 120 mg, oral, Daily    finasteride (PROSCAR) 5 mg, oral, Daily, Do not crush, chew, or split.    FreeStyle Lancets 28 gauge Does not apply, 2 times daily RT    lancets (Micro Thin Lancets) 33 gauge misc 1 Lancet, Does not apply, 2 times daily    levothyroxine (Synthroid,  Levoxyl) 137 mcg tablet Take 1 tablet by mouth every other day    omeprazole (PRILOSEC) 20 mg, oral, Daily    omeprazole (PRILOSEC) 20 mg, oral, Daily    tamsulosin (FLOMAX) 0.4 mg, oral, Daily    triamterene-hydrochlorothiazid (Dyazide) 37.5-25 mg capsule 1 capsule, oral, Daily    vit C,Z-Tm-baono-lutein-zeaxan (PreserVision AREDS-2) 250-90-40-1 mg capsule 1 capsule, oral, 2 times daily       Physical Exam:  General: Alert and Oriented, No distress, cooperative  Head: Normocephalic without obvious abnormality, atraumatic  Eyes: Conjunctiva/corneas clear, EOM's grossly intact  Neck: Supple, trachea midline, No thyroid enlargement/tenderness/nodules; No JVD  Lungs: Clear to auscultation bilaterally, no wheezes, rhonci, or rales. respirations unlabored  Chest Wall: No tenderness or deformity  Heart: Regular rhythm, normal S1/S2, no murmur  Abdomen: Soft, non-tender, Non-distended, bowel sounds active  Extremities: No edema, no cyanosis, no clubbing  Skin: Skin color, texture, turgor normal.  No rashes or lesions noted  Neurologic: Alert and oriented x 3, grossly moving all extremities, speech intact       Assessment/Plan   NSTEMI  Essential hypertension  Hyperlipidemia  CKD  Diabetes mellitus    Plan:  -I will recommend to obtain transthoracic echocardiogram to assess for structural abnormalities.  -I will continue heparin GTT.  I will continue aspirin and Brilinta.  -Will recommend high intensity statin therapy.  -Hold beta-blocker given bradycardia.  -Check lipid panel and hemoglobin A1c.  -We will plan for left heart cath and possible intervention as indicated.  -IV fluids prior to procedure for renal protection.  -Cardiology will follow please call with any questions      Peripheral IV 06/06/24 20 G Right;Posterior Forearm (Active)   Site Assessment Clean;Intact;Dry 06/06/24 2103   Dressing Type Transparent 06/06/24 2103   Line Status Blood return noted;Flushed 06/06/24 2103   Number of days: 1       Peripheral IV  06/06/24 20 G Left;Posterior Forearm (Active)   Site Assessment Clean;Dry;Intact 06/06/24 2208   Dressing Type Transparent 06/06/24 2208   Line Status Blood return noted;Flushed;Saline locked 06/06/24 2208   Dressing Status Clean;Dry 06/06/24 2208   Number of days: 1       Code Status:  Full Code            Ludwin Burns MD

## 2024-06-07 NOTE — PROGRESS NOTES
Alfonzo Baldwin is a 74 y.o. male admitted for NSTEMI (non-ST elevated myocardial infarction) ([facility]). Pharmacy reviewed the patient's aslam-df-bhuvcxflr medications and allergies for accuracy.    The list below reflects the PTA list prior to pharmacy medication history. A summary a changes to the PTA medication list has been listed below. Please review each medication in order reconciliation for additional clarification and justification.    Source of information:  T2P    Medications added:    Medications modified:  Preservision Areds 2--> Bid  Levothyroxine 137mcg every other day --> 1t M-S and 2 tabs on Sun    Medications to be removed:  Vitamin D3 5000units  Elderberry   Omeprazole 20mg - Duplicate ( Patient is taking Omeprazole 20 mg daily)    Medications of concern:      Prior to Admission Medications   Prescriptions Last Dose Informant Patient Reported? Taking?   Calcium 500 With D 500 mg-10 mcg (400 unit) tablet   Yes No   Sig: Take 1 tablet by mouth 2 times a day.   FreeStyle Lancets 28 gauge   Yes No   Sig: by Does not apply route 2 times a day.   VIT C-MULTIVIT-MIN-ELDERBERRY ORAL   Yes No   Sig: Elderberry-Maltodextrin POWD   Refills: 0       Active   atorvastatin (Lipitor) 40 mg tablet   No No   Sig: TAKE 1 TABLET BY MOUTH EVERY DAY   blood sugar diagnostic (TRUETEST TEST STRIPS MISC)   Yes No   cholecalciferol (Vitamin D-3) 5,000 Units tablet   Yes No   Sig: Take 1 tablet (5,000 Units) by mouth once daily.   dapagliflozin propanediol (Farxiga) 10 mg   No No   Sig: Take 1 tablet (10 mg) by mouth once daily.   dilTIAZem XR (Dilacor XR) 120 mg 24 hr capsule   No No   Sig: TAKE 1 CAPSULE BY MOUTH EVERY DAY   finasteride (Proscar) 5 mg tablet   No No   Sig: Take 1 tablet (5 mg) by mouth once daily. Do not crush, chew, or split.   lancets (Micro Thin Lancets) 33 gauge misc   No No   Si Lancet by Does not apply route 2 times a day.   levothyroxine (Synthroid, Levoxyl) 137 mcg tablet   No No   Sig: Take 1  tablet by mouth every other day   omeprazole (PriLOSEC) 20 mg DR capsule   No No   Sig: TAKE 1 CAPSULE BY MOUTH EVERY DAY   omeprazole (PriLOSEC) 20 mg tablet,delayed release (DR/EC) EC tablet   No No   Sig: Take 1 tablet (20 mg) by mouth once daily.   tamsulosin (Flomax) 0.4 mg 24 hr capsule   No No   Sig: Take 1 capsule (0.4 mg) by mouth once daily.   triamterene-hydrochlorothiazid (Dyazide) 37.5-25 mg capsule   No No   Sig: TAKE 1 CAPSULE BY MOUTH EVERY DAY   vit C,G-Gc-igpek-lutein-zeaxan (PreserVision AREDS-2) 250-90-40-1 mg capsule   Yes No   Sig: Take by mouth BID.      Facility-Administered Medications: None       Ngoc Bustos

## 2024-06-07 NOTE — PROGRESS NOTES
"Alfonzo Baldwin is a 74 y.o. male on day 0 of admission presenting with NSTEMI (non-ST elevated myocardial infarction) (Multi).      Subjective   Alfonzo Baldwin is a 74 y.o. male with PMHx s/f bradycardia, HTN, HLD, DM2, CKD, hypothyroidism, BPH presenting with chest pain.  Patient was in a hot tub tonight when he started having midsternal chest pain that he describes as a \"tightness\" in his chest.  Chest pain radiated down his right arm.  Did not remit until he reached the emergency room, but now he says the chest pain has mostly resolved.  He did have some shortness of breath with it, but no ERAZO. He has no cardiac history.  He is a former smoker and has quit about 30 years ago.  No nausea, vomiting, lightheadedness, dizziness, syncope, abdominal pain, bowel or bladder issues, or other symptoms.  No history of intermittent chest pain like this.     ED Course (Summary):   Vitals on presentation: 97.4 F, 64 bpm, 16 rr, 162/83, 96% on RA  Labs: CMP glu 175, K 3.1, Cr 1.65  Mag 1.91  BNP 74  Trop 836  INR 1.0  CBC WBC 6.9, Hg 17.6, Plt 165  Imaging: CXR - no evidence of acute cardiopulmonary process.  EKG - Sinus rhythm at 68 bpm, new LBBB, No ST-T changes.  Interventions:  mg, Heparin bolus and drip started, Klor-con 30 meq given. Cardiology was consulted and EKG was reviewed, but given his symptoms have mostly resolved at this pt, recommended admission and will treat medically for now.  6/7: Patient was seen and examined.  Status post left heart cath which showed 99% mid LAD occlusion status post JOY stent placement.  Continue dual antiplatelet therapy with aspirin and Brilinta in addition to atorvastatin.  Transferred to the ICU for monitoring overnight.  Creatinine remains at baseline today.  Will repeat BMP and CBC in a.m.       Objective     Last Recorded Vitals  /66   Pulse (!) 47   Temp 36.1 °C (96.9 °F) (Temporal)   Resp 18   Wt 74.8 kg (165 lb)   SpO2 94%   Intake/Output last 3 " Shifts:    Intake/Output Summary (Last 24 hours) at 6/7/2024 1755  Last data filed at 6/7/2024 1704  Gross per 24 hour   Intake 523.53 ml   Output 5 ml   Net 518.53 ml       Admission Weight  Weight: 74.8 kg (165 lb) (06/06/24 2027)    Daily Weight  06/06/24 : 74.8 kg (165 lb)    Image Results  Electrocardiogram, 12-lead PRN ACS symptoms  Sinus rhythm  Probable left atrial enlargement  Left bundle branch block  ECG 12 Lead  Sinus rhythm  Ventricular premature complex  Left bundle branch block      Physical Exam  Constitutional:       General: He is not in acute distress.     Appearance: Normal appearance. He is normal weight. He is not ill-appearing or toxic-appearing.   HENT:      Head: Normocephalic and atraumatic.      Mouth/Throat:      Mouth: Mucous membranes are moist.      Pharynx: No posterior oropharyngeal erythema.   Eyes:      Extraocular Movements: Extraocular movements intact.      Pupils: Pupils are equal, round, and reactive to light.   Cardiovascular:      Rate and Rhythm: Normal rate and regular rhythm.      Heart sounds: Normal heart sounds. No murmur heard.     No friction rub. No gallop.   Pulmonary:      Effort: Pulmonary effort is normal. No respiratory distress.      Breath sounds: Normal breath sounds. No stridor. No wheezing, rhonchi or rales.   Abdominal:      General: Abdomen is flat. Bowel sounds are normal. There is no distension.      Palpations: Abdomen is soft. There is no mass.      Tenderness: There is no abdominal tenderness. There is no right CVA tenderness, left CVA tenderness, guarding or rebound.   Musculoskeletal:         General: No swelling, tenderness, deformity or signs of injury. Normal range of motion.      Right lower leg: No edema.      Left lower leg: No edema.   Skin:     General: Skin is warm and dry.      Coloration: Skin is not jaundiced or pale.      Findings: No bruising, erythema, lesion or rash.   Neurological:      General: No focal deficit present.       Mental Status: He is alert and oriented to person, place, and time. Mental status is at baseline.      Cranial Nerves: No cranial nerve deficit.      Sensory: No sensory deficit.      Motor: No weakness.   Psychiatric:         Mood and Affect: Mood normal.         Behavior: Behavior normal.         Thought Content: Thought content normal.         Judgment: Judgment normal.      Relevant Results               Assessment/Plan   This patient currently has cardiac telemetry ordered; if you would like to modify or discontinue the telemetry order, click here to go to the orders activity to modify/discontinue the order.              Principal Problem:    NSTEMI (non-ST elevated myocardial infarction) (Multi)  Active Problems:    Essential hypertension    Hypothyroidism    Mixed hyperlipidemia    Pure hyperglyceridemia    Stage 3 chronic kidney disease (Multi)    DM2 (diabetes mellitus, type 2) (Multi)    Hypokalemia    Hyperglycemia    NSTEMI:  Status post left heart cath with JOY stent placement in the mid LAD  Continue ASA + Brilinta  Continue Lopressor 25 mg PO BID and atorvastatin  Transferred to ICU for monitoring  Cardiologist on board    Hyopkalemia:  Resolved     CKD 3:  Cr 1.65, at baseline  Trend BMP daily     HTN, hx SVT:  Home Cardizem, Maxzide-25  Monitor Bps given he is only multiple bp meds with the added Lopressor.     Hypothyroidism:  Home Synthroid     BPH:  Home Flomax, Proscar     DVT ppx: Heparin drip  Carb controlled diet  Full code per discussion with pt and wife.       Spent 35 minutes in the follow-up management of this patient today       Michaelle Byrne MD

## 2024-06-07 NOTE — PRE-SEDATION DOCUMENTATION
"Interventional Cardiology Preprocedure Note    Alfonzo Baldwin   Indication for procedure: The encounter diagnosis was NSTEMI (non-ST elevated myocardial infarction) (Multi).    Relevant review of systems:  short of breath       /67   Pulse (!) 46   Temp 36.1 °C (96.9 °F) (Temporal)   Resp 20   Ht 1.803 m (5' 11\")   Wt 74.8 kg (165 lb)   SpO2 98%   BMI 23.01 kg/m²    Relevant Labs:   Lab Results   Component Value Date    CREATININE 1.59 (H) 06/07/2024    EGFR 45 (L) 06/07/2024    INR 1.0 06/06/2024    PROTIME 11.4 06/06/2024       Planned Sedation/Anesthesia: Moderate    Airway assessment: normal    Physical Exam  Constitutional:       Appearance: Normal appearance.   HENT:      Nose: Nose normal.      Mouth/Throat:      Mouth: Mucous membranes are moist.   Cardiovascular:      Rate and Rhythm: Regular rhythm. Bradycardia present.      Pulses: Normal pulses.      Heart sounds: Normal heart sounds.   Pulmonary:      Effort: Pulmonary effort is normal.      Breath sounds: Normal breath sounds.   Abdominal:      Palpations: Abdomen is soft.   Musculoskeletal:         General: Normal range of motion.   Skin:     General: Skin is warm and dry.      Capillary Refill: Capillary refill takes less than 2 seconds.   Neurological:      General: No focal deficit present.      Mental Status: He is alert and oriented to person, place, and time.         Mallampati: III (soft and hard palate and base of uvula visible)    ASA Score: ASA 3 - Patient with moderate systemic disease with functional limitations    Benefits, risks and alternatives of procedure and planned sedation have been discussed with the patient and/or their representative. All questions answered and they agree to proceed.     Lou Fuentes, APRN-CNP   " Alert-The patient is alert, awake and responds to voice. The patient is oriented to time, place, and person. The triage nurse is able to obtain subjective information.

## 2024-06-07 NOTE — ED PROVIDER NOTES
HPI   Chief Complaint   Patient presents with    Chest Pain     Pt reports chest pressure starting approx 20min ago, denies shortness of breath. Denies any cardiac hx       HPI     HISTORY OF PRESENT ILLNESS:  Patient is a 74-year-old male who states that he presents to the emergency department for chest pain.  The patient was in a hot tub when he started to experience midsternal chest pain with radiation down his right arm.  He did not note any exertional symptoms but did feel short of breath with it.  No nausea, fever, chills, diaphoresis associated with this before.  Is not had a prior heart attack.  Patient states the pain is now down to a 1 out of 10.  Not taking medications for symptoms.    Past Medical History: Hypertension, diabetes, GERD, hyperparathyroidism, hypothyroidism  Past Surgical History: Parathyroidectomy and left thyroid lobectomy, cholecystectomy, colonoscopy, hernia repair, prostate biopsy  Family History: family history not pertinent to presenting problem or chief complaint  Social History: Former smoker    __________________________________________________________  PHYSICAL EXAM:    Appearance: Alert, oriented , cooperative   Skin: Intact,  dry skin, no lesions, rash, petechiae or purpura.   Eyes: PERRLA, EOMs intact,  Conjunctiva pink with no redness or exudates.    HENT: Normocephalic, atraumatic. Nares patent   Neck: Supple. Trachea at midline.   Pulmonary: Lung sounds are clear bilaterally.  There is no rales, rhonchi, or wheezing.  Cardiac: Regular rate and rhythm, no rubs, murmurs, or gallops. No JVD,   Abdomen: Abdomen is soft, nontender, and nondistended.  No palpable organomegaly.  No rebound or guarding.  No CVA tenderness. Nonsurgical abdomen  Genitourinary: Exam deferred.  Musculoskeletal: no edema, pain, cyanosis, or deformity in extremities. Pulses full and equal.   Neurological:  Cranial nerves are grossly intact, grossly normal sensation, no weakness, no focal findings  identified.    __________________________________________________________  MEDICAL DECISION MAKING:    Patient was seen and examined. Differential diagnosis for Chest pain includes ACS, pneumonia, pneumothorax, acid reflux.  Patient had 45 minutes of chest pain that appeared nonexertional but was associated with shortness of breath.  Patient here had overall unremarkable vital signs.  Cardiac labs were obtained.  First troponin was elevated at 836.  EKG showing a left bundle branch block which is new compared to his prior EKG he from September 23, 2014.  Patient chest pain had improved to less than 1 out of 10.  Heparin infusion ordered.  Patient agreeable with admission.  Cardiology agreed with plan for troponin trending, heparin infusion.  Case discussed with IMS who agreed that patient admitted.      Chronic Medical Conditions Significantly Affecting Care: Hypothyroidism,  Hypertension, diabetes s    External Records Reviewed: I reviewed recent and relevant outside records including: Patient primary care physician note from February 13 to 20, 2024    Sam López  Emergency Medicine               [place] Coma Scale Score: 15                     Patient History   Past Medical History:   Diagnosis Date    Personal history of other diseases of the circulatory system     History of hypertension     Past Surgical History:   Procedure Laterality Date    OTHER SURGICAL HISTORY  09/25/2020    Hernia repair    OTHER SURGICAL HISTORY  06/03/2022    Thyroidectomy    OTHER SURGICAL HISTORY  06/03/2022    Parathyroidectomy    OTHER SURGICAL HISTORY  06/03/2022    Colonoscopy     Family History   Problem Relation Name Age of Onset    Heart disease Mother      Prostatitis Father      Other (Cardiac Disorder) Other family hx     Diabetes Other family hx     Other (Malignant Neoplasm of Prostate) Other family hx     Hypertension Other family hx         HTN     Social History     Tobacco Use    Smoking status: Former     Types:  Cigarettes    Smokeless tobacco: Never   Vaping Use    Vaping status: Never Used   Substance Use Topics    Alcohol use: Not Currently    Drug use: Never       Physical Exam   ED Triage Vitals [06/06/24 2027]   Temperature Heart Rate Respirations BP   36.3 °C (97.4 °F) 64 16 --      Pulse Ox Temp Source Heart Rate Source Patient Position   96 % Skin Monitor --      BP Location FiO2 (%)     -- --       Physical Exam    ED Course & [facility]   ED Course as of 06/06/24 2316   Thu Jun 06, 2024 2143 Patient's EKG was just located.  Sinus rhythm average rate 64, first-degree AV block with a AR interval 204 ms, occasional PVC, left bundle branch block present, negative Sgarbossa's criteria.  [WJ]   2143 Troponin I, High Sensitivity(!!): 836  Heparin ordered, EKG transmitted to cardiologist.  The patient currently has no  Chest pain at this time. [WJ]   2159 Spoke to Dr Allen.  Agrees with current plan of heparinization, [facility] admission [WJ]   2202 Repeat twelve-lead EKG shows sinus rhythm, ventricular rate of 68, left atrial enlargement, first-degree AV block with a AR interval 207 ms, left bundle branch block present, normal QT, no Sgarbossa's criteria not significantly changed from prior EKG today [WJ]      ED Course User Index  [WJ] Sam López DO         Diagnoses as of 06/06/24 2316   NSTEMI (non-ST elevated myocardial infarction) (Multi)       Medical Decision Making      Procedure  Critical Care    Performed by: Sam López DO  Authorized by: Sam López DO    Critical care provider statement:     Critical care time (minutes):  41    Critical care time was exclusive of:  Separately billable procedures and treating other patients and teaching time    Critical care was necessary to treat or prevent imminent or life-threatening deterioration of the following conditions:  Cardiac failure    Critical care was time spent personally by me on the following activities:  Blood draw for specimens, development of  treatment plan with patient or surrogate, evaluation of patient's response to treatment, examination of patient, ordering and review of laboratory studies, ordering and performing treatments and interventions, ordering and review of radiographic studies, re-evaluation of patient's condition, review of old charts and discussions with consultants    Care discussed with: admitting provider         Sam López DO  06/06/24 1431

## 2024-06-08 ENCOUNTER — PHARMACY VISIT (OUTPATIENT)
Dept: PHARMACY | Facility: CLINIC | Age: 74
End: 2024-06-08
Payer: COMMERCIAL

## 2024-06-08 VITALS
HEIGHT: 71 IN | OXYGEN SATURATION: 99 % | BODY MASS INDEX: 22.96 KG/M2 | RESPIRATION RATE: 22 BRPM | SYSTOLIC BLOOD PRESSURE: 136 MMHG | HEART RATE: 54 BPM | DIASTOLIC BLOOD PRESSURE: 79 MMHG | WEIGHT: 164 LBS | TEMPERATURE: 96.8 F

## 2024-06-08 LAB
ALBUMIN SERPL BCP-MCNC: 3.6 G/DL (ref 3.4–5)
ANION GAP SERPL CALC-SCNC: 11 MMOL/L (ref 10–20)
BUN SERPL-MCNC: 25 MG/DL (ref 6–23)
CALCIUM SERPL-MCNC: 8.3 MG/DL (ref 8.6–10.3)
CHLORIDE SERPL-SCNC: 107 MMOL/L (ref 98–107)
CHOLEST SERPL-MCNC: 127 MG/DL (ref 0–199)
CHOLESTEROL/HDL RATIO: 4.8
CO2 SERPL-SCNC: 23 MMOL/L (ref 21–32)
CREAT SERPL-MCNC: 1.5 MG/DL (ref 0.5–1.3)
EGFRCR SERPLBLD CKD-EPI 2021: 48 ML/MIN/1.73M*2
ERYTHROCYTE [DISTWIDTH] IN BLOOD BY AUTOMATED COUNT: 13.3 % (ref 11.5–14.5)
GLUCOSE SERPL-MCNC: 117 MG/DL (ref 74–99)
HCT VFR BLD AUTO: 45.9 % (ref 41–52)
HDLC SERPL-MCNC: 26.7 MG/DL
HGB BLD-MCNC: 16.6 G/DL (ref 13.5–17.5)
LDLC SERPL CALC-MCNC: 48 MG/DL
MCH RBC QN AUTO: 29.3 PG (ref 26–34)
MCHC RBC AUTO-ENTMCNC: 36.2 G/DL (ref 32–36)
MCV RBC AUTO: 81 FL (ref 80–100)
NON HDL CHOLESTEROL: 100 MG/DL (ref 0–149)
NRBC BLD-RTO: 0 /100 WBCS (ref 0–0)
PHOSPHATE SERPL-MCNC: 2.8 MG/DL (ref 2.5–4.9)
PLATELET # BLD AUTO: 154 X10*3/UL (ref 150–450)
POTASSIUM SERPL-SCNC: 4.1 MMOL/L (ref 3.5–5.3)
RBC # BLD AUTO: 5.66 X10*6/UL (ref 4.5–5.9)
SODIUM SERPL-SCNC: 137 MMOL/L (ref 136–145)
TRIGL SERPL-MCNC: 260 MG/DL (ref 0–149)
VLDL: 52 MG/DL (ref 0–40)
WBC # BLD AUTO: 6.5 X10*3/UL (ref 4.4–11.3)

## 2024-06-08 PROCEDURE — C9113 INJ PANTOPRAZOLE SODIUM, VIA: HCPCS | Performed by: NURSE PRACTITIONER

## 2024-06-08 PROCEDURE — 85027 COMPLETE CBC AUTOMATED: CPT | Performed by: NURSE PRACTITIONER

## 2024-06-08 PROCEDURE — 2500000001 HC RX 250 WO HCPCS SELF ADMINISTERED DRUGS (ALT 637 FOR MEDICARE OP): Performed by: NURSE PRACTITIONER

## 2024-06-08 PROCEDURE — 2500000004 HC RX 250 GENERAL PHARMACY W/ HCPCS (ALT 636 FOR OP/ED): Performed by: NURSE PRACTITIONER

## 2024-06-08 PROCEDURE — 84100 ASSAY OF PHOSPHORUS: CPT | Performed by: NURSE PRACTITIONER

## 2024-06-08 PROCEDURE — 99232 SBSQ HOSP IP/OBS MODERATE 35: CPT | Performed by: INTERNAL MEDICINE

## 2024-06-08 PROCEDURE — 2500000002 HC RX 250 W HCPCS SELF ADMINISTERED DRUGS (ALT 637 FOR MEDICARE OP, ALT 636 FOR OP/ED): Mod: MUE | Performed by: NURSE PRACTITIONER

## 2024-06-08 PROCEDURE — RXMED WILLOW AMBULATORY MEDICATION CHARGE

## 2024-06-08 PROCEDURE — 36415 COLL VENOUS BLD VENIPUNCTURE: CPT | Performed by: NURSE PRACTITIONER

## 2024-06-08 PROCEDURE — 80061 LIPID PANEL: CPT | Performed by: NURSE PRACTITIONER

## 2024-06-08 PROCEDURE — 99239 HOSP IP/OBS DSCHRG MGMT >30: CPT | Performed by: INTERNAL MEDICINE

## 2024-06-08 RX ORDER — ASPIRIN 81 MG/1
81 TABLET ORAL DAILY
Qty: 90 TABLET | Refills: 3 | Status: SHIPPED | OUTPATIENT
Start: 2024-06-09

## 2024-06-08 RX ORDER — ATORVASTATIN CALCIUM 80 MG/1
80 TABLET, FILM COATED ORAL DAILY
Qty: 90 TABLET | Refills: 3 | Status: SHIPPED | OUTPATIENT
Start: 2024-06-09

## 2024-06-08 RX ORDER — HYDROXYZINE HYDROCHLORIDE 25 MG/1
25 TABLET, FILM COATED ORAL EVERY 8 HOURS PRN
Qty: 30 TABLET | Refills: 0 | Status: SHIPPED | OUTPATIENT
Start: 2024-06-08 | End: 2024-06-11 | Stop reason: WASHOUT

## 2024-06-08 RX ADMIN — FINASTERIDE 5 MG: 5 TABLET, FILM COATED ORAL at 08:39

## 2024-06-08 RX ADMIN — ATORVASTATIN CALCIUM 80 MG: 40 TABLET, FILM COATED ORAL at 08:38

## 2024-06-08 RX ADMIN — TICAGRELOR 90 MG: 90 TABLET ORAL at 08:38

## 2024-06-08 RX ADMIN — TAMSULOSIN HYDROCHLORIDE 0.4 MG: 0.4 CAPSULE ORAL at 08:38

## 2024-06-08 RX ADMIN — PANTOPRAZOLE SODIUM 40 MG: 40 INJECTION, POWDER, FOR SOLUTION INTRAVENOUS at 05:14

## 2024-06-08 RX ADMIN — LEVOTHYROXINE SODIUM 137 MCG: 0.03 TABLET ORAL at 05:14

## 2024-06-08 RX ADMIN — TRIAMTERENE AND HYDROCHLOROTHIAZIDE 1 TABLET: 37.5; 25 TABLET ORAL at 08:38

## 2024-06-08 RX ADMIN — ASPIRIN 81 MG: 81 TABLET, COATED ORAL at 08:39

## 2024-06-08 ASSESSMENT — PAIN SCALES - GENERAL
PAINLEVEL_OUTOF10: 0 - NO PAIN

## 2024-06-08 ASSESSMENT — COGNITIVE AND FUNCTIONAL STATUS - GENERAL
MOBILITY SCORE: 24
DAILY ACTIVITIY SCORE: 24

## 2024-06-08 ASSESSMENT — PAIN - FUNCTIONAL ASSESSMENT
PAIN_FUNCTIONAL_ASSESSMENT: 0-10

## 2024-06-08 NOTE — CARE PLAN
The clinical goals for the shift include VSS throughout shift    Problem: Pain - Adult  Goal: Verbalizes/displays adequate comfort level or baseline comfort level  Outcome: Progressing     Problem: Safety - Adult  Goal: Free from fall injury  Outcome: Progressing     Problem: Discharge Planning  Goal: Discharge to home or other facility with appropriate resources  Outcome: Progressing     Problem: Chronic Conditions and Co-morbidities  Goal: Patient's chronic conditions and co-morbidity symptoms are monitored and maintained or improved  Outcome: Progressing     Problem: ACS/CP/NSTEMI/STEMI  Goal: Chest pain managed (free from pain or at acceptable level)  Outcome: Progressing  Goal: Lab values return to normal range  Outcome: Progressing  Goal: Promote self management  Outcome: Progressing  Goal: Serial ECG will return to baseline  Outcome: Progressing  Goal: Verbalize understanding of procedures/devices  Outcome: Progressing  Goal: Wean vasopressors/achieve hemodynamic stability  Outcome: Progressing     Problem: Arrythmia/Dysrhythmia  Goal: Lab values return to normal range  Outcome: Progressing  Goal: No evidence of post procedure complications  Outcome: Progressing  Goal: Promote self management  Outcome: Progressing  Goal: Serial ECG will return to baseline  Outcome: Progressing  Goal: Verbalize understanding of procedures/devices  Outcome: Progressing  Goal: Vital signs return to baseline  Outcome: Progressing  Goal: Care and maintenance of device (specify)  Outcome: Progressing     Problem: Cardiac catheterization  Goal: Free from dysrhythmias  Outcome: Progressing  Goal: Free from pain  Outcome: Progressing  Goal: No evidence of post procedure complications  Outcome: Progressing  Goal: Promote self management  Outcome: Progressing  Goal: Verbalize understanding of procedure  Outcome: Progressing  Goal: Care and maintenance of device (specify)  Outcome: Progressing

## 2024-06-08 NOTE — DISCHARGE SUMMARY
Discharge Diagnosis  NSTEMI (non-ST elevated myocardial infarction) (Multi)    Issues Requiring Follow-Up  Follow-up with cardiology and primary care doctor soon as possible.  Make sure you take the baby aspirin, Brilinta and high-dose Lipitor as ordered.  See after visit summary for complete plan.      Discharge Meds     Your medication list        START taking these medications        Instructions Last Dose Given Next Dose Due   aspirin 81 mg EC tablet  Start taking on: June 9, 2024      Take 1 tablet (81 mg) by mouth once daily.       ticagrelor 90 mg tablet  Commonly known as: Brilinta      Take 1 tablet (90 mg) by mouth 2 times a day.              CHANGE how you take these medications        Instructions Last Dose Given Next Dose Due   atorvastatin 80 mg tablet  Commonly known as: Lipitor  Start taking on: June 9, 2024  What changed:   medication strength  how much to take      Take 1 tablet (80 mg) by mouth once daily.       levothyroxine 137 mcg tablet  Commonly known as: Synthroid, Levoxyl  What changed:   how much to take  how to take this  when to take this  additional instructions      Take 1 tablet by mouth every other day       omeprazole 20 mg tablet,delayed release (DR/EC) EC tablet  Commonly known as: PriLOSEC  What changed: Another medication with the same name was removed. Continue taking this medication, and follow the directions you see here.      Take 1 tablet (20 mg) by mouth once daily.              CONTINUE taking these medications        Instructions Last Dose Given Next Dose Due   dapagliflozin propanediol 10 mg  Commonly known as: Farxiga      Take 1 tablet (10 mg) by mouth once daily.       finasteride 5 mg tablet  Commonly known as: Proscar      Take 1 tablet (5 mg) by mouth once daily. Do not crush, chew, or split.       FreeStyle Lancets 28 gauge  Generic drug: FreeStyle lancets           lancets 33 gauge misc  Commonly known as: Micro Thin Lancets      1 Lancet by Does not apply route  "2 times a day.       PreserVision AREDS-2 250-90-40-1 mg capsule  Generic drug: vit C,P-Dm-pnopm-lutein-zeaxan           tamsulosin 0.4 mg 24 hr capsule  Commonly known as: Flomax      Take 1 capsule (0.4 mg) by mouth once daily.       triamterene-hydrochlorothiazid 37.5-25 mg capsule  Commonly known as: Dyazide      TAKE 1 CAPSULE BY MOUTH EVERY DAY       TRUETEST TEST STRIPS MISC                  STOP taking these medications      Calcium 500 With D 500 mg-10 mcg (400 unit) tablet  Generic drug: calcium carbonate-vitamin D3        dilTIAZem  mg 24 hr capsule  Commonly known as: Dilacor XR                  Where to Get Your Medications        These medications were sent to [facility]  [id number] [address]      Hours: 8AM to 6PM Mon-Fri, 8AM to 4PM Sat-Sun Phone: [phone]   aspirin 81 mg EC tablet  atorvastatin 80 mg tablet  ticagrelor 90 mg tablet         Test Results Pending At Discharge  Pending Labs       No current pending labs.            Hospital Course   Alfonzo Baldwin is a 74 y.o. male on day 1 of admission presenting with NSTEMI (non-ST elevated myocardial infarction) (Multi).           Subjective   Alfonzo Baldwin is a 74 y.o. male with PMHx s/f bradycardia, HTN, HLD, DM2, CKD, hypothyroidism, BPH presenting with chest pain.  Patient was in a hot tub tonight when he started having midsternal chest pain that he describes as a \"tightness\" in his chest.  Chest pain radiated down his right arm.  Did not remit until he reached the emergency room, but now he says the chest pain has mostly resolved.  He did have some shortness of breath with it, but no ERAZO. He has no cardiac history.  He is a former smoker and has quit about 30 years ago.  No nausea, vomiting, lightheadedness, dizziness, syncope, abdominal pain, bowel or bladder issues, or other symptoms.  No history of intermittent chest pain like this.     ED Course (Summary):   Vitals on presentation: 97.4 F, 64 bpm, " 16 rr, 162/83, 96% on RA  Labs: CMP glu 175, K 3.1, Cr 1.65  Mag 1.91  BNP 74  Trop 836  INR 1.0  CBC WBC 6.9, Hg 17.6, Plt 165  Imaging: CXR - no evidence of acute cardiopulmonary process.  EKG - Sinus rhythm at 68 bpm, new LBBB, No ST-T changes.  Interventions:  mg, Heparin bolus and drip started, Klor-con 30 meq given. Cardiology was consulted and EKG was reviewed, but given his symptoms have mostly resolved at this pt, recommended admission and will treat medically for now.  6/7: Patient was seen and examined.  Status post left heart cath which showed 99% mid LAD occlusion status post JOY stent placement.  Continue dual antiplatelet therapy with aspirin and Brilinta in addition to atorvastatin.  Transferred to the ICU for monitoring overnight.  Creatinine remains at baseline today.  Will repeat BMP and CBC in a.m.  6/8: Seen by cardiology today who feels patient stable for discharge home with diltiazem and beta-blocker will be discontinued.  Patient will call cardiology immediately if he has lightheadedness slow heart rate or shortness of breath.  Of note patient is having some episodes of anxiety versus intermittent shortness of breath which could be from his Brilinta but he states that the symptoms did respond to the Atarax.  At this time patient will be discharged home.  Follow-up with cardiology and primary care doctor.     See after visit summary for complete plan  Prescriptions will be brought to patient at bedside     35 minutes spent in the care of this patient.             Pertinent Physical Exam At Time of Discharge  Physical Exam  See today's progress note for more physical exam findings.  Outpatient Follow-Up  No future appointments.      Basim French MD

## 2024-06-08 NOTE — PROGRESS NOTES
Subjective Data:  Alfonzo Baldwin is a 74 y.o. male with past medical history of bradycardia, hypertension, hyperlipidemia, diabetes, CKD, hypothyroidism who presented to the hospital due to chest pain.  Patient reports that yesterday he started having chest tightness with radiation to his arm.  Due to this pain he decided to come to the hospital.  Pain has resolved in the hospital.  His cardiac enzymes were found to be elevated.  Cardiology was consulted for the same.  He had prior stress test and was following with cardiology in the past.  Prior stress test was unremarkable for ischemia.     On arrival to hospital he was hemodynamically stable with blood pressure 162/83 and heart rate of 64 bpm.  Currently on telemetry monitoring heart rate is in 40s to 50s sinus rhythm.  Patient reports that this bradycardia is chronic.     Patient was started on aspirin 325 mg and heparin was started with bolus.  Brilinta was also started on this patient.    Overnight Events:    6/8/2024 : S/P PCI of LAD. No CP     Objective Data:  Last Recorded Vitals:  Vitals:    06/08/24 0400 06/08/24 0500 06/08/24 0600 06/08/24 0742   BP: 97/50 (!) 103/46 106/53    Patient Position: Lying      Pulse: (!) 45 (!) 44 (!) 45    Resp: 20 18 18    Temp: 36.2 °C (97.2 °F)   36 °C (96.8 °F)   TempSrc: Temporal   Temporal   SpO2:   98%    Weight:       Height:           Last Labs:  CBC - 6/8/2024:  5:13 AM  6.5 16.6 154    45.9      CMP - 6/8/2024:  5:13 AM  8.3 7.2 26 --- 0.6   2.8 3.6 34 47      PTT - No results in last year.  1.0   11.4 _     TROPHS   Date/Time Value Ref Range Status   06/07/2024 12:20 AM 2,186 0 - 20 ng/L Final     Comment:     Previous result verified on 6/6/2024 2133 on specimen/case [id number] called with component [facility] for procedure Troponin I, High Sensitivity, Initial with value 836 ng/L.   06/06/2024 09:59 PM 1,204 0 - 20 ng/L Final     Comment:     Previous result verified on 6/6/2024 2133 on specimen/case  "[id number] called with component [facility] for procedure Troponin I, High Sensitivity, Initial with value 836 ng/L.   06/06/2024 09:00  0 - 20 ng/L Final     BNP   Date/Time Value Ref Range Status   06/06/2024 09:00 PM 74 0 - 99 pg/mL Final     HGBA1C   Date/Time Value Ref Range Status   03/08/2024 09:24 AM 7.2 4.2 - 6.5 % Final   12/08/2023 09:28 AM 7.8 4.2 - 6.5 % Final     LDLCALC   Date/Time Value Ref Range Status   06/08/2024 05:13 AM 48 <=99 mg/dL Final     Comment:                                 Near   Borderline      AGE      Desirable  Optimal    High     High     Very High     0-19 Y     0 - 109     ---    110-129   >/= 130     ----    20-24 Y     0 - 119     ---    120-159   >/= 160     ----      >24 Y     0 -  99   100-129  130-159   160-189     >/=190       VLDL   Date/Time Value Ref Range Status   06/08/2024 05:13 AM 52 0 - 40 mg/dL Final   03/22/2022 08:09 AM 54 0 - 40 mg/dL Final   01/18/2021 10:26 AM 34 0 - 40 mg/dL Final   06/19/2020 10:11 AM 25 0 - 40 mg/dL Final      Last I/O:  I/O last 3 completed shifts:  In: 1566.9 (21.1 mL/kg) [P.O.:240; I.V.:810.2 (10.9 mL/kg); IV Piggyback:516.7]  Out: 1005 (13.5 mL/kg) [Urine:1000 (0.4 mL/kg/hr); Blood:5]  Weight: 74.4 kg     Past Cardiology Tests (Last 3 Years):  EKG:  Electrocardiogram, 12-lead PRN ACS symptoms 06/06/2024 (Preliminary)      ECG 12 Lead 06/06/2024 (Preliminary)    Echo:  Transthoracic Echo (TTE) Complete 06/07/2024    Ejection Fractions:  No results found for: \"EF\"  Cath:  No results found for this or any previous visit from the past 1095 days.    Stress Test:  No results found for this or any previous visit from the past 1095 days.    Cardiac Imaging:  No results found for this or any previous visit from the past 1095 days.      Inpatient Medications:  Scheduled medications   Medication Dose Route Frequency    aspirin  81 mg oral Daily    atorvastatin  80 mg oral Daily    finasteride  5 mg oral Daily    levothyroxine  137 mcg " oral Daily    [Held by provider] metoprolol tartrate  25 mg oral BID    pantoprazole  40 mg oral Daily before breakfast    Or    pantoprazole  40 mg intravenous Daily before breakfast    tamsulosin  0.4 mg oral Daily    ticagrelor  90 mg oral BID    triamterene-hydrochlorothiazid  1 tablet oral Daily     PRN medications   Medication    bisacodyl    bisacodyl    guaiFENesin    hydrOXYzine HCL    melatonin    morphine    ondansetron    Or    ondansetron    oxygen    sodium chloride 0.9%     Continuous Medications   Medication Dose Last Rate       Physical Exam:  General: Alert and Oriented, No distress, cooperative  Head: Normocephalic without obvious abnormality, atraumatic  Eyes: Conjunctiva/corneas clear, EOM's grossly intact  Neck: Supple, trachea midline, No thyroid enlargement/tenderness/nodules; No JVD  Lungs: Clear to auscultation bilaterally, no wheezes, rhonci, or rales. respirations unlabored  Chest Wall: No tenderness or deformity  Heart: Regular rhythm, normal S1/S2, no murmur  Abdomen: Soft, non-tender, Non-distended, bowel sounds active  Extremities: No edema, no cyanosis, no clubbing  Skin: Skin color, texture, turgor normal.  No rashes or lesions noted  Neurologic: Alert and oriented x 3, grossly moving all extremities, speech intact     Assessment/Plan   1) NSTEMI  S/P PCI of LAD  DAPT and statins  No beta blockers due to bradycardia  Can discharge home today  Peripheral IV 06/06/24 20 G Right;Posterior Forearm (Active)   Site Assessment Clean;Dry;Intact 06/08/24 0400   Dressing Type Transparent 06/08/24 0400   Line Status Saline locked 06/08/24 0400   Dressing Status Clean;Dry 06/08/24 0400   Number of days: 2       Peripheral IV 06/06/24 20 G Left;Posterior Forearm (Active)   Site Assessment Clean;Dry;Intact 06/08/24 0400   Dressing Type Transparent 06/08/24 0400   Line Status Saline locked 06/08/24 0400   Dressing Status Clean;Dry 06/08/24 0400   Number of days: 2       Code Status:  Full  Code    I spent 30 minutes in the professional and overall care of this patient.        Agusto Santos MD

## 2024-06-08 NOTE — PROGRESS NOTES
"Alfonzo Baldwin is a 74 y.o. male on day 1 of admission presenting with NSTEMI (non-ST elevated myocardial infarction) (Multi).      Subjective   Alfonzo Baldwin is a 74 y.o. male with PMHx s/f bradycardia, HTN, HLD, DM2, CKD, hypothyroidism, BPH presenting with chest pain.  Patient was in a hot tub tonight when he started having midsternal chest pain that he describes as a \"tightness\" in his chest.  Chest pain radiated down his right arm.  Did not remit until he reached the emergency room, but now he says the chest pain has mostly resolved.  He did have some shortness of breath with it, but no ERAZO. He has no cardiac history.  He is a former smoker and has quit about 30 years ago.  No nausea, vomiting, lightheadedness, dizziness, syncope, abdominal pain, bowel or bladder issues, or other symptoms.  No history of intermittent chest pain like this.     ED Course (Summary):   Vitals on presentation: 97.4 F, 64 bpm, 16 rr, 162/83, 96% on RA  Labs: CMP glu 175, K 3.1, Cr 1.65  Mag 1.91  BNP 74  Trop 836  INR 1.0  CBC WBC 6.9, Hg 17.6, Plt 165  Imaging: CXR - no evidence of acute cardiopulmonary process.  EKG - Sinus rhythm at 68 bpm, new LBBB, No ST-T changes.  Interventions:  mg, Heparin bolus and drip started, Klor-con 30 meq given. Cardiology was consulted and EKG was reviewed, but given his symptoms have mostly resolved at this pt, recommended admission and will treat medically for now.  6/7: Patient was seen and examined.  Status post left heart cath which showed 99% mid LAD occlusion status post JOY stent placement.  Continue dual antiplatelet therapy with aspirin and Brilinta in addition to atorvastatin.  Transferred to the ICU for monitoring overnight.  Creatinine remains at baseline today.  Will repeat BMP and CBC in a.m.  6/8: Seen by cardiology today who feels patient stable for discharge home with diltiazem and beta-blocker will be discontinued.  Patient will call cardiology immediately if he has " lightheadedness slow heart rate or shortness of breath.  Of note patient is having some episodes of anxiety versus intermittent shortness of breath which could be from his Brilinta but he states that the symptoms did respond to the Atarax.  At this time patient will be discharged home.  Follow-up with cardiology and primary care doctor.    See after visit summary for complete plan  Prescriptions will be brought to patient at bedside    35 minutes spent in the care of this patient.         Objective     Last Recorded Vitals  /79   Pulse 54   Temp 36 °C (96.8 °F) (Temporal)   Resp 22   Wt 74.4 kg (164 lb)   SpO2 99%   Intake/Output last 3 Shifts:    Intake/Output Summary (Last 24 hours) at 6/8/2024 0951  Last data filed at 6/8/2024 0922  Gross per 24 hour   Intake 2581.39 ml   Output 1005 ml   Net 1576.39 ml       Admission Weight  Weight: 74.8 kg (165 lb) (06/06/24 2027)    Daily Weight  06/07/24 : 74.4 kg (164 lb)    Image Results  Transthoracic Echo (TTE) [place] [address]       Phone [phone] Fax [phone]    TRANSTHORACIC ECHOCARDIOGRAM REPORT       Patient Name:     MISAEL Gilman Physician:  [id number] Mcfadden MD  Study Date:       6/7/2024             Ordering Provider:  Zuri MCFADDEN  MRN/PID:          [id number]             Fellow:  Accession#:       [id number]         Nurse:  Date of           1950 / 74 years  Sonographer:        Ayala Peterson RDCS  Birth/Age:  Gender:           M                    Additional Staff:  Height:           180.34 cm            Admit Date:         6/7/2024  Weight:           74.84 kg             Admission Status:   Inpatient - STAT  BSA / BMI:        1.94 m2 / 23.01      Department          [facility]                    kg/m2                Location:  Blood Pressure: 114 /68 mmHg    Study Type:     TRANSTHORACIC ECHO (TTE) COMPLETE  Diagnosis/ICD: Non ST elevation (NSTEMI) myocardial infarction-I21.4  Indication:    Chest Pain  CPT Codes:     Echo Complete w Full [id number]    Patient History:  Pertinent History: HTN and Hyperlipidemia. SVT.    Study Detail: The following Echo studies were performed: 2D, M-Mode, Doppler and                color flow.       PHYSICIAN INTERPRETATION:  Left Ventricle: Left ventricular systolic function is low normal, with an estimated ejection fraction of 50-55%. There are no regional wall motion abnormalities. The left ventricular cavity size is normal. Spectral Doppler shows an impaired relaxation pattern of left ventricular diastolic filling.  LV Wall Scoring:  The entire septum, apical anterior segment, and apex are hypokinetic. All  remaining scored segments are normal.    Left Atrium: The left atrium is normal in size.  Right Ventricle: The right ventricle is normal in size. There is normal right ventricular global systolic function.  Right Atrium: The right atrium is normal in size.  Aortic Valve: The aortic valve is trileaflet. There is trivial aortic valve regurgitation. The peak instantaneous gradient of the aortic valve is 7.6 mmHg. The mean gradient of the aortic valve is 3.7 mmHg.  Mitral Valve: The mitral valve is normal in structure. There is no evidence of mitral valve regurgitation.  Tricuspid Valve: The tricuspid valve is structurally normal. There is trace tricuspid regurgitation. The Doppler estimated RVSP is within normal limits at 23.8 mmHg.  Pulmonic Valve: The pulmonic valve is structurally normal. There is mild pulmonic valve regurgitation.  Pericardium: There is no pericardial effusion noted.  Aorta: The aortic root is normal.       CONCLUSIONS:   1. Left ventricular systolic function is low normal with a 50-55% estimated ejection fraction.   2. Spectral Doppler shows an impaired relaxation pattern of left ventricular diastolic filling.   3. Entire  septum, apical anterior segment, and apex are abnormal.   4. RVSP within normal limits.    QUANTITATIVE DATA SUMMARY:  2D MEASUREMENTS:                           Normal Ranges:  LAs:           3.72 cm   (2.7-4.0cm)  IVSd:          0.89 cm   (0.6-1.1cm)  LVPWd:         0.70 cm   (0.6-1.1cm)  LVIDd:         5.30 cm   (3.9-5.9cm)  LVIDs:         3.11 cm  LV Mass Index: 76.7 g/m2  LV % FS        41.4 %    LA VOLUME:                                Normal Ranges:  LA Vol A4C:        57.9 ml    (22+/-6mL/m2)  LA Vol A2C:        49.9 ml  LA Vol BP:         56.0 ml  LA Vol Index A4C:  29.8 ml/m2  LA Vol Index A2C:  25.7 ml/m2  LA Vol Index BP:   28.8 ml/m2  LA Area A4C:       17.9 cm2  LA Area A2C:       17.3 cm2  LA Major Axis A4C: 4.7 cm  LA Major Axis A2C: 5.1 cm  LA Volume Index:   28.8 ml/m2  LA Vol A4C:        55.3 ml  LA Vol A2C:        47.5 ml    RA VOLUME BY A/L METHOD:                        Normal Ranges:  RA Area A4C: 15.3 cm2    AORTA MEASUREMENTS:                       Normal Ranges:  Ao Sinus, d: 3.70 cm (2.1-3.5cm)  Ao STJ, d:   2.80 cm (1.7-3.4cm)  Asc Ao, d:   3.20 cm (2.1-3.4cm)    LV SYSTOLIC FUNCTION BY 2D PLANIMETRY (MOD):                      Normal Ranges:  EF-A4C View: 62.2 % (>=55%)  EF-A2C View: 56.8 %  EF-Biplane:  55.3 %    LV DIASTOLIC FUNCTION:                         Normal Ranges:  MV Peak E:    0.71 m/s (0.7-1.2 m/s)  MV Peak A:    0.84 m/s (0.42-0.7 m/s)  E/A Ratio:    0.85     (1.0-2.2)  MV e'         0.08 m/s (>8.0)  MV lateral e' 0.08 m/s  MV medial e'  0.08 m/s  E/e' Ratio:   8.92     (<8.0)    MITRAL VALVE:                  Normal Ranges:  MV DT: 208 msec (150-240msec)    AORTIC VALVE:                                    Normal Ranges:  AoV Vmax:                1.37 m/s (<=1.7m/s)  AoV Peak P.6 mmHg (<20mmHg)  AoV Mean PG:             3.7 mmHg (1.7-11.5mmHg)  LVOT Max Shaji:            1.14 m/s (<=1.1m/s)  AoV VTI:                 31.08 cm (18-25cm)  LVOT VTI:                 24.21 cm  LVOT Diameter:           2.07 cm  (1.8-2.4cm)  AoV Area, VTI:           2.62 cm2 (2.5-5.5cm2)  AoV Area,Vmax:           2.79 cm2 (2.5-4.5cm2)  AoV Dimensionless Index: 0.78       RIGHT VENTRICLE:  RV Basal 4.05 cm  RV Mid   3.00 cm  TAPSE:   22.2 mm  RV s'    0.15 m/s    TRICUSPID VALVE/RVSP:                              Normal Ranges:  Peak TR Velocity: 2.28 m/s  RV Syst Pressure: 23.8 mmHg (< 30mmHg)  IVC Diam:         1.54 cm    AORTA:  Asc Ao Diam 3.17 cm       [id number] Ludwin Burns MD  Electronically signed on 6/7/2024 at 8:29:54 PM       Wall Scoring       ** Final **  Electrocardiogram, 12-lead PRN ACS symptoms  Sinus rhythm  Probable left atrial enlargement  Left bundle branch block  ECG 12 Lead  Sinus rhythm  Ventricular premature complex  Left bundle branch block      Physical Exam  Constitutional:       General: He is not in acute distress.     Appearance: Normal appearance. He is normal weight. He is not ill-appearing or toxic-appearing.   HENT:      Head: Normocephalic and atraumatic.      Mouth/Throat:      Mouth: Mucous membranes are moist.      Pharynx: No posterior oropharyngeal erythema.   Eyes:      Extraocular Movements: Extraocular movements intact.      Pupils: Pupils are equal, round, and reactive to light.   Cardiovascular:      Rate and Rhythm: Normal rate and regular rhythm.      Heart sounds: Normal heart sounds. No murmur heard.     No friction rub. No gallop.   Pulmonary:      Effort: Pulmonary effort is normal. No respiratory distress.      Breath sounds: Normal breath sounds. No stridor. No wheezing, rhonchi or rales.   Abdominal:      General: Abdomen is flat. Bowel sounds are normal. There is no distension.      Palpations: Abdomen is soft. There is no mass.      Tenderness: There is no abdominal tenderness. There is no right CVA tenderness, left CVA tenderness, guarding or rebound.   Musculoskeletal:         General: No swelling, tenderness, deformity or signs of injury.  Normal range of motion.      Right lower leg: No edema.      Left lower leg: No edema.   Skin:     General: Skin is warm and dry.      Coloration: Skin is not jaundiced or pale.      Findings: No bruising, erythema, lesion or rash.   Neurological:      General: No focal deficit present.      Mental Status: He is alert and oriented to person, place, and time. Mental status is at baseline.      Cranial Nerves: No cranial nerve deficit.      Sensory: No sensory deficit.      Motor: No weakness.   Psychiatric:      Patient has some anxiety.     Relevant Results               Assessment/Plan   This patient currently has cardiac telemetry ordered; if you would like to modify or discontinue the telemetry order, click here to go to the orders activity to modify/discontinue the order.      Non-ST elevation MI stent placed in the mid LAD 6/7/2024  Hypokalemia resolved  Intermittent postprocedural shortness of breath/anxiety  Essential hypertension  Mixed hyperlipidemia  Type 2 diabetes with hyperglycemia  Stage III chronic kidney disease  Hypothyroidism  BPH    See after visit summary for complete plan to discharge home today per cardiology.                 Spent 35 minutes in the follow-up management of this patient today       Basim French MD

## 2024-06-10 ENCOUNTER — TELEPHONE (OUTPATIENT)
Dept: CARDIOLOGY | Facility: CLINIC | Age: 74
End: 2024-06-10
Payer: MEDICARE

## 2024-06-10 PROBLEM — L60.0 INGROWING TOENAIL: Status: ACTIVE | Noted: 2024-06-10

## 2024-06-10 PROBLEM — Q66.70 PES CAVUS: Status: ACTIVE | Noted: 2024-06-10

## 2024-06-10 PROBLEM — M79.609 PAIN IN LIMB: Status: ACTIVE | Noted: 2024-06-10

## 2024-06-10 PROBLEM — H26.9 CATARACT: Status: ACTIVE | Noted: 2024-06-10

## 2024-06-10 PROBLEM — H93.19 TINNITUS: Status: ACTIVE | Noted: 2024-06-10

## 2024-06-10 PROBLEM — E11.9 TYPE 2 DIABETES MELLITUS (MULTI): Status: ACTIVE | Noted: 2021-09-16

## 2024-06-10 PROBLEM — E89.0 POSTOPERATIVE HYPOTHYROIDISM: Status: ACTIVE | Noted: 2021-04-15

## 2024-06-10 PROBLEM — N18.9 CHRONIC KIDNEY DISEASE: Status: ACTIVE | Noted: 2020-06-19

## 2024-06-10 PROBLEM — H35.3190 NONEXUDATIVE AGE-RELATED MACULAR DEGENERATION: Status: ACTIVE | Noted: 2024-06-10

## 2024-06-10 PROBLEM — E89.2 S/P PARATHYROIDECTOMY (CMS/HCC): Status: ACTIVE | Noted: 2023-02-20

## 2024-06-10 PROBLEM — H91.90 HEARING LOSS: Status: ACTIVE | Noted: 2024-06-10

## 2024-06-10 PROBLEM — Q66.70: Status: ACTIVE | Noted: 2024-06-10

## 2024-06-10 PROBLEM — B35.1 ONYCHOMYCOSIS: Status: ACTIVE | Noted: 2024-06-10

## 2024-06-10 PROBLEM — N39.0 URINARY TRACT INFECTION: Status: ACTIVE | Noted: 2023-04-28

## 2024-06-10 PROBLEM — Z77.29 EXPOSURE TO POTENTIALLY HAZARDOUS SUBSTANCE: Status: ACTIVE | Noted: 2024-06-10

## 2024-06-10 PROBLEM — Z86.79 HISTORY OF HYPERTENSION: Status: ACTIVE | Noted: 2024-06-10

## 2024-06-10 PROBLEM — M85.89 OSTEOPENIA OF MULTIPLE SITES: Status: ACTIVE | Noted: 2023-02-20

## 2024-06-10 PROBLEM — E78.5 DYSLIPIDEMIA: Status: ACTIVE | Noted: 2024-06-10

## 2024-06-10 PROBLEM — R22.42 LOCALIZED SWELLING, MASS AND LUMP, LEFT LOWER LIMB: Status: ACTIVE | Noted: 2024-06-10

## 2024-06-10 LAB
ACT BLD: 337 SEC (ref 89–169)
ACT BLD: 391 SEC (ref 89–169)

## 2024-06-10 RX ORDER — ACETAMINOPHEN 500 MG
TABLET ORAL
COMMUNITY

## 2024-06-10 NOTE — TELEPHONE ENCOUNTER
6/10/24  1045  Transitional Care Management Note    Discharge Facility: [facility]  Discharge Diagnosis: NSTEMI  Admission Date: 6/6/24  Discharge Date: 6/8/24  Discharge Physician:  Gillian     PCP Appointment Date: 6/11/24  Specialist Appointment Date (must be with in 14 days of discharge): Dr. Burns 6/11/24@1045  Hospital Encounter and Summary Reviewed: Yes    Engagement  Initial Post-Discharge Communication (or 2 attempts) with in 2 business days post D/C  Call Start Time: 6/10/24 @1043     Medications  Medications reviewed with patient/caregiver?: Yes  Is the patient having any side effects they believe may be caused by any medication additions or changes?: No  Does the patient have all medications ordered at discharge?: Yes  Is the patient taking all medications as directed (includes completed medication regime)?: Yes  Medication Comments:      Appointments  Does the patient have a primary care provider?: Yes  Care Management Interventions: No     Self Management  What is the home health agency?: N/A  What Durable Medical Equipment (DME) was ordered?: N/A  Is patient independent with activities of daily living?  If not, are they getting the assistance they need?     Patient Teaching  Any restrictions on discharge? No heaving lifting with arm used for heart cath.  Assessment of any procedure sites or wounds:  0 S/S of infection  What is the patient's perception of their health status since discharge?:  Good    Current Outpatient Medications   Medication Instructions    acetaminophen (Tylenol) 500 mg tablet 2 tab(s) orally as needed    aspirin 81 mg, oral, Daily    atorvastatin (LIPITOR) 80 mg, oral, Daily    blood sugar diagnostic (TRUETEST TEST STRIPS MISC)     dapagliflozin propanediol (FARXIGA) 10 mg, oral, Daily    finasteride (PROSCAR) 5 mg, oral, Daily, Do not crush, chew, or split.    FreeStyle Lancets 28 gauge Does not apply, 2 times daily RT    hydrOXYzine HCL (ATARAX) 25 mg, oral, Every 8 hours PRN     lancets (Micro Thin Lancets) 33 gauge misc 1 Lancet, Does not apply, 2 times daily    levothyroxine (Synthroid, Levoxyl) 137 mcg tablet Take 1 tablet by mouth every other day    omeprazole (PRILOSEC) 20 mg, oral, Daily    tamsulosin (FLOMAX) 0.4 mg, oral, Daily    ticagrelor (Brilinta) 90 mg tablet Take 1 tablet (90 mg) by mouth 2 times a day.    triamterene-hydrochlorothiazid (Dyazide) 37.5-25 mg capsule 1 capsule, oral, Daily    vit C,V-Wc-qnfjy-lutein-zeaxan (PreserVision AREDS-2) 250-90-40-1 mg capsule 1 capsule, oral, 2 times daily        Allergies   Allergen Reactions    Ace Inhibitors Cough     cough    Valsartan Cough     cough    Lisinopril Cough        ----- Message from Dean Sosa sent at 6/10/2024  8:40 AM EDT -----  Regarding: [facility]  Its tomorrow at 1045 am

## 2024-06-11 ENCOUNTER — OFFICE VISIT (OUTPATIENT)
Dept: PRIMARY CARE | Facility: CLINIC | Age: 74
End: 2024-06-11
Payer: MEDICARE

## 2024-06-11 ENCOUNTER — OFFICE VISIT (OUTPATIENT)
Dept: CARDIOLOGY | Facility: CLINIC | Age: 74
End: 2024-06-11
Payer: MEDICARE

## 2024-06-11 VITALS
DIASTOLIC BLOOD PRESSURE: 70 MMHG | HEART RATE: 76 BPM | WEIGHT: 165 LBS | TEMPERATURE: 98.3 F | BODY MASS INDEX: 23.1 KG/M2 | HEIGHT: 71 IN | SYSTOLIC BLOOD PRESSURE: 126 MMHG | OXYGEN SATURATION: 99 %

## 2024-06-11 VITALS
HEIGHT: 71 IN | SYSTOLIC BLOOD PRESSURE: 134 MMHG | HEART RATE: 83 BPM | OXYGEN SATURATION: 95 % | DIASTOLIC BLOOD PRESSURE: 78 MMHG | WEIGHT: 165 LBS | BODY MASS INDEX: 23.1 KG/M2

## 2024-06-11 DIAGNOSIS — I10 PRIMARY HYPERTENSION: ICD-10-CM

## 2024-06-11 DIAGNOSIS — I21.4 NSTEMI (NON-ST ELEVATED MYOCARDIAL INFARCTION) (MULTI): ICD-10-CM

## 2024-06-11 DIAGNOSIS — E78.5 DYSLIPIDEMIA: ICD-10-CM

## 2024-06-11 DIAGNOSIS — E21.3 HYPERPARATHYROIDISM (MULTI): ICD-10-CM

## 2024-06-11 DIAGNOSIS — N18.9 CHRONIC KIDNEY DISEASE, UNSPECIFIED CKD STAGE: ICD-10-CM

## 2024-06-11 DIAGNOSIS — I22.2 SUBSEQUENT NON-ST ELEVATION (NSTEMI) MYOCARDIAL INFARCTION (MULTI): Primary | ICD-10-CM

## 2024-06-11 DIAGNOSIS — E03.9 HYPOTHYROIDISM, UNSPECIFIED TYPE: ICD-10-CM

## 2024-06-11 DIAGNOSIS — E11.21 TYPE 2 DIABETES MELLITUS WITH DIABETIC NEPHROPATHY, WITHOUT LONG-TERM CURRENT USE OF INSULIN (MULTI): Primary | ICD-10-CM

## 2024-06-11 DIAGNOSIS — I21.4 NON-ST ELEVATION MYOCARDIAL INFARCTION (NSTEMI) (MULTI): Primary | ICD-10-CM

## 2024-06-11 DIAGNOSIS — E11.65 TYPE 2 DIABETES MELLITUS WITH HYPERGLYCEMIA, WITHOUT LONG-TERM CURRENT USE OF INSULIN (MULTI): ICD-10-CM

## 2024-06-11 LAB
POC FINGERSTICK BLOOD GLUCOSE: 113 MG/DL (ref 70–100)
POC HEMOGLOBIN A1C: 7.2 % (ref 4.2–6.5)

## 2024-06-11 PROCEDURE — 82962 GLUCOSE BLOOD TEST: CPT | Performed by: NURSE PRACTITIONER

## 2024-06-11 PROCEDURE — 1036F TOBACCO NON-USER: CPT | Performed by: STUDENT IN AN ORGANIZED HEALTH CARE EDUCATION/TRAINING PROGRAM

## 2024-06-11 PROCEDURE — 3074F SYST BP LT 130 MM HG: CPT | Performed by: NURSE PRACTITIONER

## 2024-06-11 PROCEDURE — 1111F DSCHRG MED/CURRENT MED MERGE: CPT | Performed by: STUDENT IN AN ORGANIZED HEALTH CARE EDUCATION/TRAINING PROGRAM

## 2024-06-11 PROCEDURE — 83036 HEMOGLOBIN GLYCOSYLATED A1C: CPT | Performed by: NURSE PRACTITIONER

## 2024-06-11 PROCEDURE — 3078F DIAST BP <80 MM HG: CPT | Performed by: STUDENT IN AN ORGANIZED HEALTH CARE EDUCATION/TRAINING PROGRAM

## 2024-06-11 PROCEDURE — 1111F DSCHRG MED/CURRENT MED MERGE: CPT | Performed by: NURSE PRACTITIONER

## 2024-06-11 PROCEDURE — 99496 TRANSJ CARE MGMT HIGH F2F 7D: CPT | Performed by: STUDENT IN AN ORGANIZED HEALTH CARE EDUCATION/TRAINING PROGRAM

## 2024-06-11 PROCEDURE — 3078F DIAST BP <80 MM HG: CPT | Performed by: NURSE PRACTITIONER

## 2024-06-11 PROCEDURE — 93000 ELECTROCARDIOGRAM COMPLETE: CPT | Performed by: STUDENT IN AN ORGANIZED HEALTH CARE EDUCATION/TRAINING PROGRAM

## 2024-06-11 PROCEDURE — 1159F MED LIST DOCD IN RCRD: CPT | Performed by: STUDENT IN AN ORGANIZED HEALTH CARE EDUCATION/TRAINING PROGRAM

## 2024-06-11 PROCEDURE — 3075F SYST BP GE 130 - 139MM HG: CPT | Performed by: STUDENT IN AN ORGANIZED HEALTH CARE EDUCATION/TRAINING PROGRAM

## 2024-06-11 PROCEDURE — 99214 OFFICE O/P EST MOD 30 MIN: CPT | Performed by: NURSE PRACTITIONER

## 2024-06-11 PROCEDURE — 1160F RVW MEDS BY RX/DR IN RCRD: CPT | Performed by: NURSE PRACTITIONER

## 2024-06-11 PROCEDURE — 1159F MED LIST DOCD IN RCRD: CPT | Performed by: NURSE PRACTITIONER

## 2024-06-11 PROCEDURE — 1036F TOBACCO NON-USER: CPT | Performed by: NURSE PRACTITIONER

## 2024-06-11 RX ORDER — DAPAGLIFLOZIN 10 MG/1
10 TABLET, FILM COATED ORAL DAILY
Qty: 100 TABLET | Refills: 1 | Status: SHIPPED | OUTPATIENT
Start: 2024-06-11

## 2024-06-11 RX ORDER — LEVOTHYROXINE SODIUM 137 UG/1
TABLET ORAL
Qty: 120 TABLET | Refills: 1 | Status: SHIPPED | OUTPATIENT
Start: 2024-06-11

## 2024-06-11 RX ORDER — METOPROLOL TARTRATE 25 MG/1
25 TABLET, FILM COATED ORAL 2 TIMES DAILY
Qty: 60 TABLET | Refills: 11 | Status: SHIPPED | OUTPATIENT
Start: 2024-06-11 | End: 2025-06-11

## 2024-06-11 ASSESSMENT — ENCOUNTER SYMPTOMS
GASTROINTESTINAL NEGATIVE: 1
RESPIRATORY NEGATIVE: 1
CARDIOVASCULAR NEGATIVE: 1
ENDOCRINE NEGATIVE: 1
CONSTITUTIONAL NEGATIVE: 1
PSYCHIATRIC NEGATIVE: 1
NEUROLOGICAL NEGATIVE: 1
MUSCULOSKELETAL NEGATIVE: 1

## 2024-06-11 NOTE — PROGRESS NOTES
" Alfonzo Baldwin is a 74 y.o. here for a diabetic follow up exam.     Pt states they are doing well. Following a low carbohydrate diet and is active.     Up to date with eye and foot exams, pcp visits.     [facility]  Endo follows him for his thyroid issues  Has a cardiology appt just got released from  for cp - dr lennon will be in follow up   Dr ying will be longstanging - he did get a stent placed on meds.  [facility] has had no new s[facility]   [facility].      Last wt on chart 164  - 165 today   Last bmi 22.87  Last aic 7.2 (7.8) goal under 7    Meds:  Farxiga 10mg daily     Lab Results   Component Value Date    POCGLU 113 (A) 06/11/2024    POCGLU 215 (A) 03/08/2024    POCGLU 172 (A) 12/08/2023    HGBA1C 7.2 (A) 06/11/2024    HGBA1C 7.2 (A) 03/08/2024    HGBA1C 7.8 (A) 12/08/2023    HGBA1C 5.9 06/19/2020    HGBA1C 7.1 10/04/2019     /70   Pulse 76   Temp 36.8 °C (98.3 °F)   Ht 1.803 m (5' 11\")   Wt 74.8 kg (165 lb)   SpO2 99%   BMI 23.01 kg/m²    Wt Readings from Last 5 Encounters:   06/11/24 74.8 kg (165 lb)   06/11/24 74.8 kg (165 lb)   06/07/24 74.4 kg (164 lb)   03/08/24 78.5 kg (173 lb)   12/27/23 76.7 kg (169 lb)          Review of Systems   Constitutional: Negative.    HENT: Negative.     Respiratory: Negative.     Cardiovascular: Negative.    Gastrointestinal: Negative.    Endocrine: Negative.    Genitourinary: Negative.    Musculoskeletal: Negative.    Skin: Negative.    Neurological: Negative.    Psychiatric/Behavioral: Negative.          Physical Exam  Vitals and nursing note reviewed.   Constitutional:       Appearance: Normal appearance.   HENT:      Head: Normocephalic.   Eyes:      Pupils: Pupils are equal, round, and reactive to light.   Cardiovascular:      Rate and Rhythm: Normal rate and regular rhythm.      Heart sounds: Normal heart sounds.   Pulmonary:      Effort: Pulmonary effort is " normal.      Breath sounds: Normal breath sounds.   Skin:     General: Skin is warm and dry.   Neurological:      General: No focal deficit present.      Mental Status: He is alert and oriented to person, place, and time. Mental status is at baseline.   Psychiatric:         Mood and Affect: Mood normal.         Behavior: Behavior normal.         Thought Content: Thought content normal.         Judgment: Judgment normal.        Problem List Items Addressed This Visit             ICD-10-CM    Hypertension I10    Hyperparathyroidism (Multi) E21.3    Hypothyroid E03.9    Relevant Medications    levothyroxine (Synthroid, Levoxyl) 137 mcg tablet    Chronic kidney disease N18.9    NSTEMI (non-ST elevated myocardial infarction) (Multi) I21.4    Type 2 diabetes mellitus (Multi) - Primary E11.9    Relevant Medications    dapagliflozin propanediol (Farxiga) 10 mg    Other Relevant Orders    POCT glycosylated hemoglobin (Hb A1C) manually resulted (Completed)    POCT fingerstick glucose manually resulted (Completed)    Laura L Seaver, APRN-CNP

## 2024-06-11 NOTE — PROGRESS NOTES
[facility] Clinic Note    Date: 06/11/24  Time: 9:18 PM    Subjective   Alfonzo Baldwin is a 74 y.o. male with past medical history of bradycardia, hypertension, hyperlipidemia, diabetes, CKD, hypothyroidism who presented to the hospital due to chest pain/NSTEMI.  Patient is coming provide today for follow-up visit.  Patient had recent NSTEMI and was found to have severe lesion in the LAD.  Patient underwent PCI of mid LAD with resolution of his anginal symptoms.  He was discharged in stable situation.  He is on DAPT and compliant with medical therapy.  His echo had wall motion abnormalities in the LAD territory.  Today he remains a stable without any new complaints.  In the hospital his beta-blockers were held because of bradycardia.  He was also on calcium beta-blocker which was also stopped.  Today his heart rate and blood pressure are stable.        Review of Systems:  Otherwise, limited cardiovascular review of systems is negative.        Medical History:   He has a past medical history of Personal history of other diseases of the circulatory system.  Surgical History:   Past Surgical History:   Procedure Laterality Date    CARDIAC CATHETERIZATION N/A 6/7/2024    Procedure: Left Heart Cath, With LV;  Surgeon: Ludwin Burns MD;  Location: [facility] Cardiac Cath Lab;  Service: Cardiovascular;  Laterality: N/A;    CARDIAC CATHETERIZATION N/A 6/7/2024    Procedure: PCI JOY Stent- Coronary;  Surgeon: Ludwin Burns MD;  Location: [facility] Cardiac Cath Lab;  Service: Cardiovascular;  Laterality: N/A;    OTHER SURGICAL HISTORY  09/25/2020    Hernia repair    OTHER SURGICAL HISTORY  06/03/2022    Thyroidectomy    OTHER SURGICAL HISTORY  06/03/2022    Parathyroidectomy    OTHER SURGICAL HISTORY  06/03/2022    Colonoscopy   PSHP@  Social History:   Social Determinants of Health with Concerns     Tobacco Use: Medium Risk (6/11/2024)    Patient History     Smoking Tobacco Use: Former     Smokeless  Tobacco Use: Never     Passive Exposure: Not on file   Food Insecurity: Not on file   Physical Activity: Not on file   Stress: Not on file   Social Connections: Not on file   Intimate Partner Violence: Not on file   Utilities: Not on file   Digital Equity: Not on file   Health Literacy: Not on file     Family History:   Family History   Problem Relation Name Age of Onset    Heart disease Mother      Prostatitis Father      Other (Cardiac Disorder) Other family hx     Diabetes Other family hx     Other (Malignant Neoplasm of Prostate) Other family hx     Hypertension Other family hx         HTN      Allergies:  Ace inhibitors, Valsartan, and Lisinopril    Outpatient Medications:  Current Outpatient Medications   Medication Instructions    acetaminophen (Tylenol) 500 mg tablet 2 tab(s) orally as needed    aspirin 81 mg, oral, Daily    atorvastatin (LIPITOR) 80 mg, oral, Daily    blood sugar diagnostic (TRUETEST TEST STRIPS MISC)     dapagliflozin propanediol (FARXIGA) 10 mg, oral, Daily    finasteride (PROSCAR) 5 mg, oral, Daily, Do not crush, chew, or split.    FreeStyle Lancets 28 gauge Does not apply, 2 times daily RT    lancets (Micro Thin Lancets) 33 gauge misc 1 Lancet, Does not apply, 2 times daily    levothyroxine (Synthroid, Levoxyl) 137 mcg tablet Take 1 tablet by mouth every other day    metoprolol tartrate (LOPRESSOR) 25 mg, oral, 2 times daily    omeprazole (PRILOSEC) 20 mg, oral, Daily    tamsulosin (FLOMAX) 0.4 mg, oral, Daily    ticagrelor (Brilinta) 90 mg tablet Take 1 tablet (90 mg) by mouth 2 times a day.    triamterene-hydrochlorothiazid (Dyazide) 37.5-25 mg capsule 1 capsule, oral, Daily    vit C,N-Dr-nfupe-lutein-zeaxan (PreserVision AREDS-2) 250-90-40-1 mg capsule 1 capsule, oral, 2 times daily       Objective     Physical Exam  Vitals:    06/11/24 1055   BP: 134/78   BP Location: Left arm   Patient Position: Sitting   BP Cuff Size: Adult   Pulse: 83   SpO2: 95%   Weight: 74.8 kg (165 lb)  "  Height: 1.803 m (5' 11\")     Wt Readings from Last 3 Encounters:   06/11/24 74.8 kg (165 lb)   06/11/24 74.8 kg (165 lb)   06/07/24 74.4 kg (164 lb)       General: Alert and Oriented, No distress, cooperative  Head: Normocephalic without obvious abnormality, atraumatic  Eyes: Conjunctiva/corneas clear, EOM's grossly intact  Neck: Supple, trachea midline, No thyroid enlargement/tenderness/nodules; No JVD  Lungs: Clear to auscultation bilaterally, no wheezes, rhonci, or rales. respirations unlabored  Chest Wall: No tenderness or deformity  Heart: Regular rhythm, normal S1/S2, no murmur  Abdomen: Soft, non-tender, Non-distended, bowel sounds active  Extremities: No edema, no cyanosis, no clubbing  Skin: Skin color, texture, turgor normal.  No rashes or lesions noted  Neurologic: Alert and oriented x 3, grossly moving all extremities, speech intact        I have personally reviewed the following images and laboratory findings:  ECG: NSR with PVC, left axis deviation, LVH, poor R wave progression  Echocardiogram:   PHYSICIAN INTERPRETATION:  Left Ventricle: Left ventricular systolic function is low normal, with an estimated ejection fraction of 50-55%. There are no regional wall motion abnormalities. The left ventricular cavity size is normal. Spectral Doppler shows an impaired relaxation pattern of left ventricular diastolic filling.  LV Wall Scoring:  The entire septum, apical anterior segment, and apex are hypokinetic. All  remaining scored segments are normal.     Left Atrium: The left atrium is normal in size.  Right Ventricle: The right ventricle is normal in size. There is normal right ventricular global systolic function.  Right Atrium: The right atrium is normal in size.  Aortic Valve: The aortic valve is trileaflet. There is trivial aortic valve regurgitation. The peak instantaneous gradient of the aortic valve is 7.6 mmHg. The mean gradient of the aortic valve is 3.7 mmHg.  Mitral Valve: The mitral valve is " normal in structure. There is no evidence of mitral valve regurgitation.  Tricuspid Valve: The tricuspid valve is structurally normal. There is trace tricuspid regurgitation. The Doppler estimated RVSP is within normal limits at 23.8 mmHg.  Pulmonic Valve: The pulmonic valve is structurally normal. There is mild pulmonic valve regurgitation.  Pericardium: There is no pericardial effusion noted.  Aorta: The aortic root is normal.        CONCLUSIONS:   1. Left ventricular systolic function is low normal with a 50-55% estimated ejection fraction.   2. Spectral Doppler shows an impaired relaxation pattern of left ventricular diastolic filling.   3. Entire septum, apical anterior segment, and apex are abnormal.   4. RVSP within normal limits.  Recommendations:  Maximize medical therapy.  Agressive risk factor modification efforts.  Follow-up with cardiology clinic.  Lipid lowering agent or Statin therapy.  DAPT for 12 months minimum.  Consider referral to cardiac rehabilitation.     ____________________________________________________________________________________  CONCLUSIONS:   1. Culprit vessel(s): left anterior descending.   2. Severe mid LAD stenosis, Culprit for ACS. Status post successful PCI with deployment of 2.75 x 30 mm [place] frontier JOY, postdilated with 3.0 NC balloon at high pressure.   3. Left Ventricular end-diastolic pressure = 13 mmHg.     Laboratory values:   Office Visit on 06/11/2024   Component Date Value    POC HEMOGLOBIN A1c 06/11/2024 7.2 (A)     POC Fingerstick Blood Gl* 06/11/2024 113 (A)    Admission on 06/06/2024, Discharged on 06/08/2024   Component Date Value    WBC 06/06/2024 6.9     nRBC 06/06/2024 0.0     RBC 06/06/2024 6.02 (H)     Hemoglobin 06/06/2024 17.6 (H)     Hematocrit 06/06/2024 48.2     MCV 06/06/2024 80     MCH 06/06/2024 29.2     MCHC 06/06/2024 36.5 (H)     RDW 06/06/2024 13.0     Platelets 06/06/2024 165     Neutrophils % 06/06/2024 52.4     Immature Granulocytes %,*  06/06/2024 0.3     Lymphocytes % 06/06/2024 34.8     Monocytes % 06/06/2024 8.3     Eosinophils % 06/06/2024 3.2     Basophils % 06/06/2024 1.0     Neutrophils Absolute 06/06/2024 3.59     Immature Granulocytes Ab* 06/06/2024 0.02     Lymphocytes Absolute 06/06/2024 2.39     Monocytes Absolute 06/06/2024 0.57     Eosinophils Absolute 06/06/2024 0.22     Basophils Absolute 06/06/2024 0.07     Protime 06/06/2024 11.4     INR 06/06/2024 1.0     Glucose 06/06/2024 175 (H)     Sodium 06/06/2024 137     Potassium 06/06/2024 3.1 (L)     Chloride 06/06/2024 102     Bicarbonate 06/06/2024 24     Anion Gap 06/06/2024 14     Urea Nitrogen 06/06/2024 28 (H)     Creatinine 06/06/2024 1.65 (H)     eGFR 06/06/2024 43 (L)     Calcium 06/06/2024 9.3     Albumin 06/06/2024 4.4     Alkaline Phosphatase 06/06/2024 47     Total Protein 06/06/2024 7.2     AST 06/06/2024 26     Bilirubin, Total 06/06/2024 0.6     ALT 06/06/2024 34     Magnesium 06/06/2024 1.91     BNP 06/06/2024 74     Ventricular Rate 06/06/2024 64     Atrial Rate 06/06/2024 65     [place] Interval 06/06/2024 204     QRS Duration 06/06/2024 126     QT Interval 06/06/2024 432     QTC Calculation(Bazett) 06/06/2024 446     P Axis 06/06/2024 34     R Ruth 06/06/2024 -55     T Axis 06/06/2024 96     QRS Count 06/06/2024 10     Q Onset 06/06/2024 251     T Offset 06/06/2024 467     QTC Fredericia 06/06/2024 441     Troponin I, High Sensiti* 06/06/2024 836 ()     Troponin I, High Sensiti* 06/06/2024 1,204 ()     WBC 06/07/2024 8.1     nRBC 06/07/2024 0.0     RBC 06/07/2024 5.90     Hemoglobin 06/07/2024 17.1     Hematocrit 06/07/2024 47.3     MCV 06/07/2024 80     MCH 06/07/2024 29.0     MCHC 06/07/2024 36.2 (H)     RDW 06/07/2024 13.1     Platelets 06/07/2024 162     Glucose 06/07/2024 180 (H)     Sodium 06/07/2024 138     Potassium 06/07/2024 3.6     Chloride 06/07/2024 102     Bicarbonate 06/07/2024 25     Anion Gap 06/07/2024 15     Urea Nitrogen 06/07/2024 27 (H)      Creatinine 06/07/2024 1.59 (H)     eGFR 06/07/2024 45 (L)     Calcium 06/07/2024 9.1     Troponin I, High Sensiti* 06/07/2024 2,186 (HH)     Heparin Unfractionated 06/07/2024 0.5     Heparin Unfractionated 06/07/2024 0.4     LVOT diam 06/07/2024 2.07     LV Biplane EF 06/07/2024 55     MV E/A ratio 06/07/2024 0.85     MV avg E/e' ratio 06/07/2024 8.92     Tricuspid annular plane * 06/07/2024 2.2     AV mn grad 06/07/2024 3.7     LA vol index A/L 06/07/2024 28.8     AV pk silvio 06/07/2024 1.37     RV free wall pk S' 06/07/2024 15.07     RVSP 06/07/2024 23.8     LVIDd 06/07/2024 5.30     Aortic Valve Area by Con* 06/07/2024 2.62     Aortic Valve Area by Con* 06/07/2024 2.79     AV pk grad 06/07/2024 7.6     LV A4C EF 06/07/2024 62.2     Ventricular Rate 06/06/2024 68     Atrial Rate 06/06/2024 68     MA Interval 06/06/2024 207     QRS Duration 06/06/2024 126     QT Interval 06/06/2024 440     QTC Calculation(Bazett) 06/06/2024 469     P Axis 06/06/2024 40     R [place] 06/06/2024 -47     T Axis 06/06/2024 81     QRS Count 06/06/2024 11     Q Onset 06/06/2024 249     T Offset 06/06/2024 469     QTC Fredericia 06/06/2024 459     WBC 06/08/2024 6.5     nRBC 06/08/2024 0.0     RBC 06/08/2024 5.66     Hemoglobin 06/08/2024 16.6     Hematocrit 06/08/2024 45.9     MCV 06/08/2024 81     MCH 06/08/2024 29.3     MCHC 06/08/2024 36.2 (H)     RDW 06/08/2024 13.3     Platelets 06/08/2024 154     Glucose 06/08/2024 117 (H)     Sodium 06/08/2024 137     Potassium 06/08/2024 4.1     Chloride 06/08/2024 107     Bicarbonate 06/08/2024 23     Anion Gap 06/08/2024 11     Urea Nitrogen 06/08/2024 25 (H)     Creatinine 06/08/2024 1.50 (H)     eGFR 06/08/2024 48 (L)     Calcium 06/08/2024 8.3 (L)     Phosphorus 06/08/2024 2.8     Albumin 06/08/2024 3.6     Cholesterol 06/08/2024 127     HDL-Cholesterol 06/08/2024 26.7     Cholesterol/HDL Ratio 06/08/2024 4.8     LDL Calculated 06/08/2024 48     VLDL 06/08/2024 52 (H)     Triglycerides 06/08/2024  260 (H)     Non HDL Cholesterol 06/08/2024 100     POCT Activated Clotting * 06/07/2024 337 (H)     POCT Activated Clotting * 06/07/2024 391 (H)    Lab on 05/28/2024   Component Date Value    Prostate Specific AG 05/28/2024 2.92      CBC -  Lab Results   Component Value Date    WBC 6.5 06/08/2024    HGB 16.6 06/08/2024    HCT 45.9 06/08/2024    MCV 81 06/08/2024     06/08/2024       CMP -  Lab Results   Component Value Date    CALCIUM 8.3 (L) 06/08/2024    PHOS 2.8 06/08/2024    PROT 7.2 06/06/2024    ALBUMIN 3.6 06/08/2024    AST 26 06/06/2024    ALT 34 06/06/2024    ALKPHOS 47 06/06/2024    BILITOT 0.6 06/06/2024       LIPID PANEL -   Lab Results   Component Value Date    CHOL 127 06/08/2024    HDL 26.7 06/08/2024    CHHDL 4.8 06/08/2024    VLDL 52 (H) 06/08/2024    TRIG 260 (H) 06/08/2024    NHDL 100 06/08/2024       RENAL FUNCTION PANEL -   Lab Results   Component Value Date    K 4.1 06/08/2024    PHOS 2.8 06/08/2024       Lab Results   Component Value Date    BNP 74 06/06/2024    HGBA1C 7.2 (A) 06/11/2024        Assessment/Plan   Subsequent NSTEMI status post PCI of LAD  Essential hypertension  Hyperlipidemia  CKD  Diabetes mellitus     Plan:  -I reviewed the results of left heart cath and echocardiogram with the patient and family.  -I will continue DAPT with aspirin and Brilinta for minimum of 1 year.  -Will recommend to continue high intensity statin therapy.  -I will resume on low-dose metoprolol.  -Cardiac rehab evaluation.  -Continue triamterene/hydrochlorothiazide as previously taking.    RTC as scheduled.     In addition, the following orders were placed today:  Orders Placed This Encounter   Procedures    ECG 12 Lead                 SIGNATURE: Ludwin Burns MD PATIENT NAME: Alfonzo Baldwin   DATE/TIME: June 11, 2024 9:18 PM MRN: [id number]

## 2024-06-17 DIAGNOSIS — E11.9 TYPE 2 DIABETES MELLITUS WITHOUT COMPLICATION, WITHOUT LONG-TERM CURRENT USE OF INSULIN (MULTI): Primary | ICD-10-CM

## 2024-06-17 LAB
ATRIAL RATE: 65 BPM
ATRIAL RATE: 68 BPM
P AXIS: 34 DEGREES
P AXIS: 40 DEGREES
PR INTERVAL: 204 MS
PR INTERVAL: 207 MS
Q ONSET: 249 MS
Q ONSET: 251 MS
QRS COUNT: 10 BEATS
QRS COUNT: 11 BEATS
QRS DURATION: 126 MS
QRS DURATION: 126 MS
QT INTERVAL: 432 MS
QT INTERVAL: 440 MS
QTC CALCULATION(BAZETT): 446 MS
QTC CALCULATION(BAZETT): 469 MS
QTC FREDERICIA: 441 MS
QTC FREDERICIA: 459 MS
R AXIS: -47 DEGREES
R AXIS: -55 DEGREES
T AXIS: 81 DEGREES
T AXIS: 96 DEGREES
T OFFSET: 467 MS
T OFFSET: 469 MS
VENTRICULAR RATE: 64 BPM
VENTRICULAR RATE: 68 BPM

## 2024-06-25 ENCOUNTER — CLINICAL SUPPORT (OUTPATIENT)
Dept: CARDIAC REHAB | Facility: HOSPITAL | Age: 74
End: 2024-06-25
Payer: MEDICARE

## 2024-06-25 VITALS
WEIGHT: 163.8 LBS | BODY MASS INDEX: 22.93 KG/M2 | RESPIRATION RATE: 18 BRPM | SYSTOLIC BLOOD PRESSURE: 138 MMHG | HEART RATE: 70 BPM | DIASTOLIC BLOOD PRESSURE: 74 MMHG | OXYGEN SATURATION: 98 % | HEIGHT: 71 IN

## 2024-06-25 DIAGNOSIS — I21.4 NON-ST ELEVATION MYOCARDIAL INFARCTION (NSTEMI) (MULTI): Primary | ICD-10-CM

## 2024-06-25 SDOH — HEALTH STABILITY: PHYSICAL HEALTH: ON AVERAGE, HOW MANY MINUTES DO YOU ENGAGE IN EXERCISE AT THIS LEVEL?: 50 MIN

## 2024-06-25 SDOH — HEALTH STABILITY: PHYSICAL HEALTH: ON AVERAGE, HOW MANY DAYS PER WEEK DO YOU ENGAGE IN MODERATE TO STRENUOUS EXERCISE (LIKE A BRISK WALK)?: 2 DAYS

## 2024-06-25 ASSESSMENT — ENCOUNTER SYMPTOMS
DEPRESSION: 0
LOSS OF SENSATION IN FEET: 0
OCCASIONAL FEELINGS OF UNSTEADINESS: 0

## 2024-06-25 NOTE — PROGRESS NOTES
Cardiac Rehabilitation Initial Treatment Plan    Name: Alfonzo Baldwin  Medical Record Number: [id number]  YOB: 1950  Age: 74 y.o.    Today’s Date: 6/25/2024  Primary Care Physician: Osman Coulter MD  Referring Physician: Ludwin Burns MD  Program Location: [facility]  Primary Diagnosis:   1. Non-ST elevation myocardial infarction (NSTEMI) (Multi)  Referral to Cardiac Rehab         Onset/Date of Diagnosis: 06/06/2024    Session #: 1    AACVPR Risk Stratification: Moderate      Falls Risk: Low  Psychosocial Assessment     Orientation: Pt denies stress, no hx of depression or anxiety, uses stress management techniques as needed.    PHQ-2 score (0)    Pt reported/currently experiencing stress: No  Patient uses stress management skills: Yes   History of: no history of anxiety or depression  Currently seeing a mental health provider: No  Social Support: Yes, Whom:Spouse  Quality of Life Survey:  PHQ-2  Learning Assessment:  Learning assessment/barriers: None  Preferred learning method: Reading handout  Barriers: None  Pt has some decreased vision and hearing loss    Stages of Change:Contemplation    Psychosocial Plan    Goal Status: Initial Assessment; goals not yet started    Psychosocial Goals: Demonstrating proper techniques for stress management and Identify social supports    Psychosocial Interventions/Education:   Encouraged attendance for stress management lecture.  Instructed patient on 3 minute breathing space    Nutrition Assessment:    Hyperlipidemia: Yes     Lipids:   Lab Results   Component Value Date    CHOL 127 06/08/2024    HDL 26.7 06/08/2024    LDLF 80 03/22/2022    TRIG 260 (H) 06/08/2024       Current Dietary Guidelines: Low fat, ADA  Barriers to dietary change: no    Diet Habit Survey: Picture Your Plate  Pre: Initial survey given. Pending completion and return from patient.  Post: To be done at discharge.    Diabetes Assessment    Lab Results   Component Value Date     "HGBA1C 7.2 (A) 06/11/2024       History of Diabetes: Yes Pt monitors BS at home: Yes   Frequency: 1 /day  Hypoglycemic Episodes: No     Weight Management    Height: 180.3 cm (5' 11\")  Weight: 74.3 kg (163 lb 12.8 oz)  BMI (Calculated): 22.86  No data recorded    Nutrition Plan    Goal Status: Initial Assessment; goals not yet started    Nutrition Goals: Lipid Goal: HDL>45, LDL <70, Total <180, Trigs <150, Learn how to read and interpret nutrition labels prior to discharge, Maintain body weight, and Check blood glucose daily    Nutrition Interventions/Education:   Booklet given  \"Heart Attack - Bouncing Back\"   Encourage patient to follow a heart healthy diet  Lipid profile reviewed    Exercise Assessment    Yes  Mode: Working out at gym, [place], walking  Frequency: Twice per week  Duration: 40-50 min    Exercise Prescription     Exercise Prescription based on: 6 Minute Walk Test          Frequency:  3 days/week   Mode: Treadmill, Recumbent Cycle, Arm Ergometer, and Stepper   Duration: 40 total aerobic minutes   Intensity: RPE 11-13  Target HR:     MET Level: 3-6  Patient wears supplemental O2: No     Modality Workload METs Duration (minutes)   1 Pre-Exercise      2 Arm Ergometer 5 minor   8 :00   3 Recumbent Bike 2   8 :00   4 Stepper 1.5   8 :00   5 Treadmill 2.0/0   8 :00   6 Post-Exercise        Resistance Training: No   Home Exercise Prescription given: To be given prior to discharge from program.    Exercise Plan    Goal Status: Initial Assessment; goals not yet started    Exercise Goals: Increase exercise MET level by 5-10% each week, Increase total exercise duration to 30-45 minutes, Obtain 150 minutes/week of moderate intensity aerobic exercise, and Establish a home exercise program before discharge    Exercise Interventions/Education:   Exercise prescription based on 6 MWT  Exercise changes made based on HR & RPE scale in response to exercise  Increase duration by 1-2 minutes per modality for a total " of 40 minutes per session  Target goal for duration 40 minutes per session, increase MET levels by 5-10% every 30 days or as tolerated  Encouraged home exercise on days not in rehab      Other Core Components/Risk Factor Assessment:    Medication adherence  Current Medications:   Medication Documentation Review Audit       Reviewed by Laura L Seaver, APRN-CNP (Nurse Practitioner) on 06/11/24 at 1854      Medication Order Taking? Sig Documenting Provider Last Dose Status   acetaminophen (Tylenol) 500 mg tablet [id number] Yes 2 tab(s) orally as needed Historical Provider, MD Taking Active   aspirin 81 mg EC tablet [id number] Yes Take 1 tablet (81 mg) by mouth once daily. Basim French MD Taking Active     Discontinued 06/08/24 1150   atorvastatin (Lipitor) 80 mg tablet [id number] Yes Take 1 tablet (80 mg) by mouth once daily. Basim French MD Taking Active   blood sugar diagnostic (TRUETEST TEST STRIPS MISC) [id number]   Historical Provider, MD  Active     Discontinued 06/08/24 1150     Discontinued 06/07/24 0858   dapagliflozin propanediol (Farxiga) 10 mg [id number]  Take 1 tablet (10 mg) by mouth once daily. Laura L Seaver, APRN-CNP  Active     Discontinued 06/08/24 1150   finasteride (Proscar) 5 mg tablet [id number] Yes Take 1 tablet (5 mg) by mouth once daily. Do not crush, chew, or split. Osman Coulter MD Taking Active   FreeStyle Lancets 28 gauge [id number]  by Does not apply route 2 times a day. Historical Provider, MD  Active     Discontinued 06/11/24 0941   lancets (Micro Thin Lancets) 33 gauge misc [id number]  1 Lancet by Does not apply route 2 times a day. Osman Coulter MD  Active   levothyroxine (Synthroid, Levoxyl) 137 mcg tablet [id number]  Take 1 tablet by mouth every other day Laura L Seaver, APRN-CNP  Active   metoprolol tartrate (Lopressor) 25 mg tablet [id number]  Take 1 tablet (25 mg) by mouth 2 times a day. Ludwin Burns MD  Active    Patient not taking:   Discontinued 06/11/24 0938   omeprazole  (PriLOSEC) 20 mg tablet,delayed release (DR/EC) EC tablet [id number] Yes Take 1 tablet (20 mg) by mouth once daily. Osman Coulter MD Taking Active   tamsulosin (Flomax) 0.4 mg 24 hr capsule [id number] Yes Take 1 capsule (0.4 mg) by mouth once daily. Osman Coulter MD Taking Active   ticagrelor (Brilinta) 90 mg tablet [id number] Yes Take 1 tablet (90 mg) by mouth 2 times a day. Basim French MD Taking Active   triamterene-hydrochlorothiazid (Dyazide) 37.5-25 mg capsule [id number] Yes TAKE 1 CAPSULE BY MOUTH EVERY DAY Osman Coulter MD Taking Active   vit C,I-Hh-alvou-lutein-zeaxan (PreserVision AREDS-2) 250-90-40-1 mg capsule [id number] Yes Take 1 capsule by mouth 2 times a day. Historical Provider, MD Taking Active     Discontinued 06/07/24 0902                                 Medication compliance: Yes   Uses pill box/organizer: Yes    Carries medication list: Yes     Blood Pressure Management  History of Hypertension: Yes   Medication Changes: No   Resting BP:  Visit Vitals  /74 (BP Location: Left arm, Patient Position: Sitting, BP Cuff Size: Adult)   Pulse 70   Resp 18        Heart Failure Management  Hx of Heart Failure: No    Smoking/Tobacco Assessment  Social History     Tobacco Use   Smoking Status Former    Types: Cigarettes   Smokeless Tobacco Never       Other Core Component Plan    Goal Status: Initial Assessment; goals not yet started    Other Core Component Goals: Medication compliance, Verbalize medication usage and drug actions by discharge, and Achieve resting BP of < 130/80 by discharge    Other Core Component Interventions/Education:   Pt is taking all meds as prescribed and carries an updated medication list  Blood pressure remains within target goal  Check resting BP pre and post exercise  Encouraged to carry an updated medication list  Reviewed effects of exercise on Blood Pressure    Individual Patient Goals:    Pt would like to exercise to prevent further cardiac events  Pt would like to  decrease his snacking    Goal Status: Initial Assessment; goals not yet started    Staff Comments:  Pt plans to attend 36 sessions of Cardiac Rehab in order to achieve his goals.   Orientation: BP R arm sitting 131/69, L arm sitting 143/77, standing 133/68, PO2 95%, HR 52 bpm. Lungs CTA posteriorly, Heart sounds S1S2 RRR - slow rate. Radial pulses 3+ bilat, no peripheral edema noted.   6 WMT: 1340 ft = 2.5 mph = 2.9 METs. RPE 11. /74, PO2 98%, HR 70 bpm. Pt walked well without ERAZO or CP, no cardiac symptoms or significant physical limitations noted.   Cool Down: /67, PO2 97%, HR 53 bpm.     Rehab Staff Signature: Yaakov Lackey RN

## 2024-06-26 ENCOUNTER — APPOINTMENT (OUTPATIENT)
Dept: PHARMACY | Facility: HOSPITAL | Age: 74
End: 2024-06-26
Payer: MEDICARE

## 2024-06-26 DIAGNOSIS — I10 ESSENTIAL (PRIMARY) HYPERTENSION: ICD-10-CM

## 2024-06-26 DIAGNOSIS — E11.9 TYPE 2 DIABETES MELLITUS WITHOUT COMPLICATION, WITHOUT LONG-TERM CURRENT USE OF INSULIN (MULTI): ICD-10-CM

## 2024-06-26 RX ORDER — TRIAMTERENE AND HYDROCHLOROTHIAZIDE 37.5; 25 MG/1; MG/1
1 CAPSULE ORAL DAILY
Qty: 100 CAPSULE | Refills: 1 | Status: SHIPPED | OUTPATIENT
Start: 2024-06-26

## 2024-06-26 RX ORDER — LEVOTHYROXINE SODIUM 112 UG/1
112 TABLET ORAL DAILY
COMMUNITY

## 2024-06-26 NOTE — PROGRESS NOTES
Pharmacy Post-Discharge Visit    Alfonzo Baldwin is a 74 y.o. male was referred to Clinical Pharmacy Team to complete a post-discharge medication optimization and monitoring visit.  The patient was referred for their Diabetes.    Admission Date: 6/6/24  Discharge Date: 6/8/24    Referring Provider: Osman Coulter MD  PCP: Osman Coulter MD - last visit: 6/11/24 with NP, next visit: 9/11/24      Subjective   Allergies   Allergen Reactions    Ace Inhibitors Cough     cough    Valsartan Cough     cough    Lisinopril Cough       [id number] - [place], [place] - [address] AT [facility] [address]  [address]  [facility] [address]  Phone: [phone] Fax: [phone]    [facility] [place] - [facility] AT Portal to Registered [facility]  [facility]  [place] [address]  Phone: [phone] Fax: [phone]     [facility]  [address] [facility] [address]  Phone: [phone] Fax: [phone]      Medication System Management:  Affordability/Accessibility: had significant deductible at start of Far[employer] but is affordable monthly copay  Adherence/Organization: Good      Social History     Social History Narrative    Not on file        Notable Medication changes following discharge:  Start: Brilinta, aspirin   Change: Recent dose adjustment to levothyroxine by endocrinology ([facility]), medication list updated    Objective     There were no vitals taken for this visit.   BP Readings from Last 4 Encounters:   06/25/24 138/74   06/11/24 134/78   06/11/24 126/70   06/08/24 136/79      There were no vitals filed for this visit.     LAB  Lab Results   Component Value Date    BILITOT 0.6 06/06/2024    CALCIUM 8.3 (L) 06/08/2024    CO2 23 06/08/2024     06/08/2024    CREATININE 1.50 (H) 06/08/2024    GLUCOSE 117 (H) 06/08/2024    ALKPHOS 47 06/06/2024    K 4.1 06/08/2024    PROT 7.2 06/06/2024      06/08/2024    AST 26 06/06/2024    ALT 34 06/06/2024    BUN 25 (H) 06/08/2024    ANIONGAP 11 06/08/2024    MG 1.91 06/06/2024    PHOS 2.8 06/08/2024    ALBUMIN 3.6 06/08/2024    GFRMALE 47 (A) 04/28/2023     Lab Results   Component Value Date    TRIG 260 (H) 06/08/2024    CHOL 127 06/08/2024    LDLCALC 48 06/08/2024    HDL 26.7 06/08/2024     Lab Results   Component Value Date    HGBA1C 7.2 (A) 06/11/2024         Current Outpatient Medications   Medication Instructions    acetaminophen (Tylenol) 500 mg tablet 2 tab(s) orally as needed    aspirin 81 mg, oral, Daily    atorvastatin (LIPITOR) 80 mg, oral, Daily    blood sugar diagnostic (TRUETEST TEST STRIPS MISC)     dapagliflozin propanediol (FARXIGA) 10 mg, oral, Daily    finasteride (PROSCAR) 5 mg, oral, Daily, Do not crush, chew, or split.    FreeStyle Lancets 28 gauge Does not apply, 2 times daily RT    lancets (Micro Thin Lancets) 33 gauge misc 1 Lancet, Does not apply, 2 times daily    levothyroxine (Synthroid, Levoxyl) 137 mcg tablet Take 1 tablet by mouth every other day    metoprolol tartrate (LOPRESSOR) 25 mg, oral, 2 times daily    omeprazole (PRILOSEC) 20 mg, oral, Daily    tamsulosin (FLOMAX) 0.4 mg, oral, Daily    ticagrelor (Brilinta) 90 mg tablet Take 1 tablet (90 mg) by mouth 2 times a day.    vit C,X-We-jpgrv-lutein-zeaxan (PreserVision AREDS-2) 250-90-40-1 mg capsule 1 capsule, oral, 2 times daily      DRUG INTERACTIONS  None requiring intervention at this time; denies bleeding or bruising      Assessment/Plan   Problem List Items Addressed This Visit       Type 2 diabetes mellitus (Multi)     Patient reports -130s, feeling well. Denies need for refills on diabetes medications.    Medication list updated, patient follows with cardiology and endocrinology.  Note, triamterene/hydrochlorothiazide discontinued on med rec in error, renewed medication, sent to [facility].    Follow Up: as needed    Continue all meds under the continuation of care  with the referring provider and clinical pharmacy team.    Mallika Luna, Sofia  Clinical Pharmacy Specialist, Primary Care   [phone]    Verbal consent to manage patient's drug therapy was obtained from the patient. They were informed they may decline to participate or withdraw from participation in pharmacy services at any time.

## 2024-07-01 ENCOUNTER — CLINICAL SUPPORT (OUTPATIENT)
Dept: CARDIAC REHAB | Facility: HOSPITAL | Age: 74
End: 2024-07-01
Payer: MEDICARE

## 2024-07-01 DIAGNOSIS — I21.4 NON-ST ELEVATION MYOCARDIAL INFARCTION (NSTEMI) (MULTI): ICD-10-CM

## 2024-07-01 PROCEDURE — 93798 PHYS/QHP OP CAR RHAB W/ECG: CPT | Performed by: INTERNAL MEDICINE

## 2024-07-03 ENCOUNTER — CLINICAL SUPPORT (OUTPATIENT)
Dept: CARDIAC REHAB | Facility: HOSPITAL | Age: 74
End: 2024-07-03
Payer: MEDICARE

## 2024-07-03 DIAGNOSIS — I21.4 NON-ST ELEVATION MYOCARDIAL INFARCTION (NSTEMI) (MULTI): ICD-10-CM

## 2024-07-03 PROCEDURE — 93798 PHYS/QHP OP CAR RHAB W/ECG: CPT | Performed by: INTERNAL MEDICINE

## 2024-07-08 ENCOUNTER — APPOINTMENT (OUTPATIENT)
Dept: CARDIAC REHAB | Facility: HOSPITAL | Age: 74
End: 2024-07-08
Payer: MEDICARE

## 2024-07-10 ENCOUNTER — APPOINTMENT (OUTPATIENT)
Dept: CARDIAC REHAB | Facility: HOSPITAL | Age: 74
End: 2024-07-10
Payer: MEDICARE

## 2024-07-12 ENCOUNTER — APPOINTMENT (OUTPATIENT)
Dept: CARDIAC REHAB | Facility: HOSPITAL | Age: 74
End: 2024-07-12
Payer: MEDICARE

## 2024-07-15 ENCOUNTER — APPOINTMENT (OUTPATIENT)
Dept: CARDIAC REHAB | Facility: HOSPITAL | Age: 74
End: 2024-07-15
Payer: MEDICARE

## 2024-07-17 ENCOUNTER — CLINICAL SUPPORT (OUTPATIENT)
Dept: CARDIAC REHAB | Facility: HOSPITAL | Age: 74
End: 2024-07-17
Payer: MEDICARE

## 2024-07-17 DIAGNOSIS — I21.4 NON-ST ELEVATION MYOCARDIAL INFARCTION (NSTEMI) (MULTI): ICD-10-CM

## 2024-07-17 PROCEDURE — 93798 PHYS/QHP OP CAR RHAB W/ECG: CPT | Performed by: INTERNAL MEDICINE

## 2024-07-19 ENCOUNTER — CLINICAL SUPPORT (OUTPATIENT)
Dept: CARDIAC REHAB | Facility: HOSPITAL | Age: 74
End: 2024-07-19
Payer: MEDICARE

## 2024-07-19 DIAGNOSIS — I21.4 NON-ST ELEVATION MYOCARDIAL INFARCTION (NSTEMI) (MULTI): ICD-10-CM

## 2024-07-19 PROCEDURE — 93798 PHYS/QHP OP CAR RHAB W/ECG: CPT | Performed by: INTERNAL MEDICINE

## 2024-07-22 ENCOUNTER — CLINICAL SUPPORT (OUTPATIENT)
Dept: CARDIAC REHAB | Facility: HOSPITAL | Age: 74
End: 2024-07-22
Payer: MEDICARE

## 2024-07-22 ENCOUNTER — APPOINTMENT (OUTPATIENT)
Dept: UROLOGY | Facility: CLINIC | Age: 74
End: 2024-07-22
Payer: MEDICARE

## 2024-07-22 DIAGNOSIS — N40.1 BENIGN PROSTATIC HYPERPLASIA WITH LOWER URINARY TRACT SYMPTOMS, SYMPTOM DETAILS UNSPECIFIED: Primary | ICD-10-CM

## 2024-07-22 DIAGNOSIS — I21.4 NON-ST ELEVATION MYOCARDIAL INFARCTION (NSTEMI) (MULTI): ICD-10-CM

## 2024-07-22 DIAGNOSIS — R31.9 HEMATURIA, UNSPECIFIED TYPE: ICD-10-CM

## 2024-07-22 LAB
POC BILIRUBIN, URINE: NEGATIVE
POC BLOOD, URINE: NEGATIVE
POC GLUCOSE, URINE: ABNORMAL MG/DL
POC KETONES, URINE: NEGATIVE MG/DL
POC LEUKOCYTES, URINE: NEGATIVE
POC NITRITE,URINE: NEGATIVE
POC PH, URINE: 6 PH
POC PROTEIN, URINE: NEGATIVE MG/DL
POC SPECIFIC GRAVITY, URINE: 1.01
POC UROBILINOGEN, URINE: 0.2 EU/DL

## 2024-07-22 PROCEDURE — 99213 OFFICE O/P EST LOW 20 MIN: CPT | Performed by: UROLOGY

## 2024-07-22 PROCEDURE — 1159F MED LIST DOCD IN RCRD: CPT | Performed by: UROLOGY

## 2024-07-22 PROCEDURE — 81003 URINALYSIS AUTO W/O SCOPE: CPT | Performed by: UROLOGY

## 2024-07-22 PROCEDURE — 93798 PHYS/QHP OP CAR RHAB W/ECG: CPT | Performed by: INTERNAL MEDICINE

## 2024-07-22 NOTE — PROGRESS NOTES
07/22/2024  Voiding well on Flomax 0.4 mg daily    Patient has no nausea, no vomiting, no fever.    JARROD: Deferred    PSA: 2.92 normal    We discussed the cessation of PSA check due to the age  We discussed benign prostate hypertrophy continue Flomax 0.4 mg daily  We discussed continue follow-up with PCP yearly  All the questions were answered, the patient expressed understanding and agreed to the plan.    Impression  Gross hematuria  Bilateral renal calculi small  Bilateral renal cysts  BPH  History of elevated PSA - stable  Dysuria     Plan:  No more PSA  Yearly follow-up with PCP  Continue Flomax 0.4 mg daily  Call if problem    Chief Complaint   Patient presents with    Benign Prostatic Hypertrophy     Patient here today for yearly follow up due to BPH.  Patient voiding well, nocturia x 1  Denies any issues or concerns at this time.      PSA 5/28/2024  2.92    Last Visit Plan:  Yearly JARROD and PSA         Physical Exam     TODAYS LAB RESULTS:  Component  Ref Range & Units 09:46   POC Glucose, Urine  NEGATIVE mg/dl >=1000 (4+) Abnormal    POC Bilirubin, Urine  NEGATIVE NEGATIVE   POC Ketones, Urine  NEGATIVE mg/dl NEGATIVE   POC Specific Gravity, Urine  1.005 - 1.035 1.015   POC Blood, Urine  NEGATIVE NEGATIVE   POC PH, Urine  No Reference Range Established PH 6.0   POC Protein, Urine  NEGATIVE, 30 (1+) mg/dl NEGATIVE   POC Urobilinogen, Urine  0.2, 1.0 EU/DL 0.2   Poc Nitrite, Urine  NEGATIVE NEGATIVE   POC Leukocytes, Urine  NEGATIVE NEGATIVE       ASSESSMENT&PLAN:      IMPRESSIONS:     06/02/2023  Cystoscopy     No more blood in the urine     A cystoscopy was performed under local without difficulties     Findings: Normal anterior urethra, mild enlarged prostate, mild trabeculated bladder, no tumors no stone     Pain evaluation: 0/10 before, 2/10 after.     Previous review CT scan unremarkable     Urine culture negative     Patient here today for cystoscopy due to gross hematuria.         IO Glucose - Urine  Negative REQUIRED    IO Bilirubin Negative REQUIRED    IO Ketones Negative REQUIRED    IO Specific Gravity 1.015 REQUIRED    IO Blood Negative REQUIRED    IO pH 5.5 REQUIRED    IO Protein, Urine Negative REQUIRED    IO Urobilinogen Normal (0.2-1.0 mg/dl) REQUIRED    IO Nitrite, Urine Negative REQUIRED    IO Leukocytes Trace REQUIRED         Ernestina Derrit CMA     Impression  Gross hematuria  Bilateral renal calculi small  Bilateral renal cysts  BPH  Elevated PSA - stable  Dysuria     Plan:  Yearly JARROD and PSA        05/18/2023  Gross hematuria episode, currently urine clear. Voiding well on Flomax and the Proscar     Patient has no nausea, no vomiting, no fever.     Patient here today for follow up on BPH, elevated PSA, dysuria. He was to continue the Flomax 0.4mg daily and Proscar 5mg daily.   PSA 5/4/23 3.85     He was in ER on 4/28/23 for gross hematuria x1wk. He was given course of Cefuroxime 500mg BID x10 days.    CT abd/pelvis done--IMPRESSION:  1.No acute intra-abdominal or pelvic abnormalities. No bowel  obstruction, free fluid, or free air.  2.Bilateral renal cysts and small nonobstructing calculi.  3.Prostatic enlargement.     Urine culture 4/28/23 negative.      States he was having dysuria/burning a few days prior to hematuria starting. Since completing ATBs he has been doing much better. He does not feel he is emptying his bladder as he has some dribbling and frequency still.   PVR 27ml     -JMorgenstern CMA      IO Glucose - Urine Negative REQUIRED    IO Bilirubin Negative REQUIRED    IO Ketones Negative REQUIRED    IO Specific Gravity 1.020 REQUIRED    IO Blood Trace REQUIRED    IO pH 5.5 REQUIRED    IO Protein, Urine Negative REQUIRED    IO Urobilinogen Normal (0.2-1.0 mg/dl) REQUIRED    IO Nitrite, Urine Negative REQUIRED    IO Leukocytes Trace      We discussed the gross hematuria CT results UA today  We discussed cystoscopy to complete hematuria work-up  We discussed benign prostate hypertrophy,  continue Flomax and Proscar  All the questions were answered, the patient expressed understanding and agreed to the plan.     Impression  Gross hematuria  Bilateral renal calculi small  Bilateral renal cysts  BPH  Elevated PSA - stable  Dysuria     Plan:  Cystoscopy  Continue Flomax 0.4 mg daily  Continue Proscar 5 mg daily     I spent 25 minutes with this patient. Greater than 50% of this time was spent in counseling and/or coordination of care        09/26/2022  Cancel appointment     Patient supposed to follow-up in 1 year. He is doing well     Patient here for 3 mos f/u, BPH, Elevated PSA     Patient is voiding well, Nocturia x0, On Proscar and Flomax     Patient denies Nausea, Fever, Vomiting.     Patient has PSA Q 3Mos  September 15, 2022 3.28     IO Glucose - Urine 100 mg/dl REQUIRED    IO Bilirubin Negative REQUIRED    IO Ketones Negative REQUIRED    IO Specific Gravity 1.020 REQUIRED    IO Blood Negative REQUIRED    IO pH 6.5 REQUIRED    IO Protein, Urine Negative REQUIRED    IO Urobilinogen Normal (0.2-1.0 mg/dl) REQUIRED    IO Nitrite, Urine Negative REQUIRED    IO Leukocytes            06/03/2022  Voiding well, nocturia x0. On Flomax, Proscar     Patient has no nausea, no vomiting, no fever.     JARROD: 1+, no nodule     Patient here today for year check of BPH, elevated PSA. Patient was to continue the Flomax 0.4mg daily, Proscar 5mg daily.   PSA 3/22/22 4.02  PSA 9/20/21 3.77  Patient had parathyroidectomy and partial thyroidectomy 2021 since last office visit.   -JMorgenstern CMA     IO Glucose - Urine Negative REQUIRED    IO Bilirubin Negative REQUIRED    IO Ketones Negative REQUIRED    IO Specific Gravity 1.020 REQUIRED    IO Blood Negative REQUIRED    IO pH 5.5 REQUIRED    IO Protein, Urine Negative REQUIRED    IO Urobilinogen Normal (0.2-1.0 mg/dl) REQUIRED    IO Nitrite, Urine Negative REQUIRED    IO Leukocytes Trace      We discussed benign prostate hypertrophy with moderate voiding symptoms on  Flomax 0.4mg daily  We discussed yearly PSA, stable PSA now  We discussed the continuing Proscar 5 mg daily   All the questions were answered, the patient expressed understanding and agreed to the plan.     Impression  BPH  Elevated PSA - stable  Dysuria     Plan:  Continue Flomax 0.4 mg daily  Continue Proscar 5 mg daily  Yearly JARROD and PSA        9/24/21:   Patient is here today for a year check up for elevated PSA.     Patient is taking 0.4mg Flomax and Proscar 5mg daily.     Patient has no nausea, vomiting, or fever      PSA 9/20/21 3.77      We discussed benign prostate hypertrophy with moderate voiding symptoms on Flomax 0.4mg daily  We discussed yearly PSA, stable PSA now  We discussed the continuing Proscar 5 mg daily   All the questions were answered, the patient expressed understanding and agreed to the plan.     Impression  BPH  Elevated PSA - stbale  Dysuria     Plan:  Continue Flomax 0.4 mg daily  Continue Proscar 5 mg daily  Yearly JARROD and PSA        06/04/2021  Patient did well after prostate biopsy. Patient complaints slow urine stream     Patient has no nausea, no vomiting, no fever.     Prostate biopsy space: Pathology: No malignancy     We discussed prostate biopsy pathology  We discussed benign prostate hypertrophy with moderate voiding symptoms on Flomax  We discussed the Proscar 5 mg daily trial  All the questions were answered, the patient expressed understanding and agreed to the plan.     Impression  BPH  Elevated PSA  Dysuria     Plan:  Continue Flomax 0.4 mg daily  Add Proscar 5 mg daily x30 days trial  Call back for refill  Yearly JARROD and PSA     I spent 25 minutes with this patient. Greater than 50% of this time was spent in counseling and/or coordination of care              05/21/2021  Patient was positioned left side down decubitus position.      10 cc 1% lidocaine was injected locally under ultrasound guidance.     Prostate ultrasound was performed and volume was measured.   then  prostate needle biopsy was performed and following specimen was obtained:  Left lateral apex, left lateral mid, left lateral base, left apex, left mid, left base;  Right apex, right mid, right base, right lateral apex, right lateral mid, right lateral base     Patient tolerated the procedure well and with minimal bleeding.     Pain level  0/10 before  2/10 after     We discussed post biopsy instructions  All the questions were answered, the patient expressed understanding and agreed to the plan.        Impression  BPH  Elevated PSA     Plan:  Await pathology  Appointment in 2 weeks        03/26/2021  Patient here for elevated PSA follow-up. PSA further rising up to 5.52     Patient has no nausea, no vomiting, no fever.     PSA  1/18/21 5.52.     We talked about the meaning of PSA elevation, possible prostate cancer, we discussed transrectal ultrasound-guided prostate biopsy. The risk and the benefits were discussed. Patient agreed to proceed a biopsy.     Impression  BPH  Elevated PSA     Plan:  Transrectal ultrasound-guided prostate biopsy;  Cipro 500 mg twice a day Ã--3 days started day before biopsy;  Fleets enema used 2 hours before biopsy;  Return to office 2 weeks after biopsy        09/25/2020  70-year-old gentleman presented elevated PSA 4. 33, voiding well     History of elevated PSA, prostate biopsy negative Ã--2     JARROD: Deferred      6/19/20 4.33     IO UA (automated w/o microscopy)[id number] 09:24Roshan Wood     Test NameResultFlagReference  IO Glucose - Urine         Negative                  IO Bilirubin       Negative                  IO Ketones      Negative                  IO Specific Gravity         1.025                       IO Blood          Trace                       IO pH               5.5                           IO Protein, Urine            Negative                  IO Urobilinogen                                              Normal (0.2-1.0 mg/dl)  IO Nitrite, Urine               Negative                  IO Leukocytes Trace                       IO Glucose - Urine         Negative                  IO Bilirubin       Negative                  IO Ketones      Negative                  IO Specific Gravity         1.025                       IO Blood          Trace                       IO pH               5.5                           IO Protein, Urine            Negative                  IO Urobilinogen                                              Normal (0.2-1.0 mg/dl)  IO Nitrite, Urine              Negative                  IO Leukocytes Trace                                                                 We discussed mild elevated PSA, history of elevated PSA, negative prostate biopsy  Age-adjusted normal PSA  We discussed benign prostate hypertrophy with a mild voiding symptoms  All the questions were answered, the patient expressed understanding and agreed to the plan.     Impression  BPH  Elevated PSA     Plan  Conservative management  Repeat PSA in 6 months, one year  Appointment in 1 year           PSA  5/28/2024 2.92   5/4/23 3.85  9/15, 2022 3.28  3/22/22 4.02  9/20/21 3.77  1/18/21 5.52.  6/19/20 4.33        Surgery  5/21/2021 prostate biopsy negative  Prostate biopsy negative Ã--2 in the past (before 2020)

## 2024-07-23 ENCOUNTER — DOCUMENTATION (OUTPATIENT)
Dept: CARDIAC REHAB | Facility: HOSPITAL | Age: 74
End: 2024-07-23
Payer: MEDICARE

## 2024-07-23 NOTE — PROGRESS NOTES
Cardiac Rehabilitation 30 Day Reassessment    Name: Alfonzo Baldwin  Medical Record Number: [id number]  YOB: 1950  Age: 74 y.o.    Today’s Date: 7/23/2024  Primary Care Physician: Osman Coulter MD  Referring Physician: Dr Burns  Program Location: [facility]  Primary Diagnosis: NSTEMI I21.4     Onset/Date of Diagnosis: 06/06/2024    Session #: 6    AACVPR Risk Stratification: Moderate      Falls Risk: Low  Psychosocial Assessment   07/23/24 -- 30 Day Eval -- Pt questioned re: new stress, anxiety or depressive symptoms; pt denies any new stress; pt is aware of stress reduction techniques and uses them as needed. Pt attended Stress/Anxiety/Depression education session  Orientation: Pt denies stress, no hx of depression or anxiety, uses stress management techniques as needed.    PHQ-2 score (0)    Pt reported/currently experiencing stress: No  Patient uses stress management skills: Yes   History of: no history of anxiety or depression  Currently seeing a mental health provider: No  Social Support: Yes, Whom:Spouse  Quality of Life Survey:  PHQ-2  Learning Assessment:  Learning assessment/barriers: None  Preferred learning method: Reading handout  Barriers: None  Pt has some decreased vision and hearing loss    Stages of Change:Action    Psychosocial Plan    Goal Status: In progress    Psychosocial Goals: Demonstrating proper techniques for stress management and Identify social supports    Psychosocial Interventions/Education:   Encouraged attendance for stress management lecture.  Instructed patient on 3 minute breathing space  Pt attended Stress/Anxiety/Depression education session with handout    Education sessions attended/Handouts given:  Exercise  Blood Pressure  Stress/Anxiety/Depression    Initial Knowledge test returned, scored 4/15 because he did not see back of page. Reviewed w/ pt 7/1  Nutrition Assessment:  07/23/24 -- 30 Day Eval -- Pt is attempting to eat more heart healthy.  "His weight is down 2#. No new lipid labs are available. No hypoglycemic episodes.  Hyperlipidemia: Yes     Lipids:   Lab Results   Component Value Date    CHOL 127 06/08/2024    HDL 26.7 06/08/2024    LDLF 80 03/22/2022    TRIG 260 (H) 06/08/2024       Current Dietary Guidelines: Low fat, ADA  Barriers to dietary change: no    Diet Habit Survey: Block Fat Screener  Pre:  Initial Fat Screener returned 7/1, scored High (>35%). Reviewed w/ pt. AHA Healthy Fat handout given  Post: To be done at discharge.    Diabetes Assessment    Lab Results   Component Value Date    HGBA1C 7.2 (A) 06/11/2024       History of Diabetes: Yes Pt monitors BS at home: Yes   Frequency: 1 /day  Hypoglycemic Episodes: No     Weight Management  07/23/24 -- 30 Day Eval -- Pt's weight is down 2#     Nutrition Plan    Goal Status: In progress    Nutrition Goals: Lipid Goal: HDL>45, LDL <70, Total <180, Trigs <150, Learn how to read and interpret nutrition labels prior to discharge, Maintain body weight, and Check blood glucose daily    Nutrition Interventions/Education:   Booklet given  \"Heart Attack - Bouncing Back\"   Encourage patient to follow a heart healthy diet  Lipid profile reviewed    Exercise Assessment  07/23/24 -- 30 Day Eval -- Pt is exercising for 40 min at 2.7 to 2.9 METs and progressing slowly without cardiac symptoms. He attended the Exercise education session.    Home Exercise?: Yes  Mode: Working out at gym, [employer], walking  Frequency: Twice per week  Duration: 40-50 min    Exercise Prescription     Exercise Prescription based on: 6 Minute Walk Test          Frequency:  3 days/week   Mode: Treadmill, Recumbent Cycle, Arm Ergometer, and Stepper   Duration: 40 total aerobic minutes   Intensity: RPE 11-13  Target HR:     MET Level: 3-6  Patient wears supplemental O2: No     Modality Workload METs Duration (minutes)   1 Pre-Exercise      2 Arm Ergometer 5 minor   10 :00   3 Recumbent Bike 3.0 2.9 10 :00   4 Stepper 1.5  2.8 " 10 :00   5 Treadmill 2.2/0  2.7 10 :00   6 Post-Exercise        Resistance Training: No   Home Exercise Prescription given: To be given prior to discharge from program.    Exercise Plan    Goal Status: In progress    Exercise Goals: Increase exercise MET level by 5-10% each week, Increase total exercise duration to 30-45 minutes, Obtain 150 minutes/week of moderate intensity aerobic exercise, and Establish a home exercise program before discharge    Exercise Interventions/Education:   Exercise prescription based on 6 MWT  Exercise changes made based on HR & RPE scale in response to exercise  Increase duration by 1-2 minutes per modality for a total of 40 minutes per session  Target goal for duration 40 minutes per session, increase MET levels by 5-10% every 30 days or as tolerated  Encouraged home exercise on days not in rehab  Pt attended Exercise education session and received the handout      Other Core Components/Risk Factor Assessment:  07/23/24 -- 30 Day Eval -- Pt is taking meds as prescribed and BP is at goal. Pt attended Blood Pressure education session and received the handout. No recent medication changes.  Medication adherence  Current Medications:   Medication Documentation Review Audit       Reviewed by Quiana Chand MA (Medical Assistant) on 07/22/24 at 0950      Medication Order Taking? Sig Documenting Provider Last Dose Status   acetaminophen (Tylenol) 500 mg tablet [id number] Yes 2 tab(s) orally as needed Historical Provider, MD Taking Active   aspirin 81 mg EC tablet [id number] Yes Take 1 tablet (81 mg) by mouth once daily. Basim French MD Taking Active   atorvastatin (Lipitor) 80 mg tablet [id number] Yes Take 1 tablet (80 mg) by mouth once daily. Basim French MD Taking Active   blood sugar diagnostic (TRUETEST TEST STRIPS MISC) [id number] Yes  Historical Provider, MD Taking Active   dapagliflozin propanediol (Farxiga) 10 mg [id number] Yes Take 1 tablet (10 mg) by mouth once daily. Taty PARKINSON  Seaver, APRN-CNP Taking Active   finasteride (Proscar) 5 mg tablet [id number] Yes Take 1 tablet (5 mg) by mouth once daily. Do not crush, chew, or split. Osman Coulter MD Taking Active   FreeStyle Lancets 28 gauge [id number] Yes by Does not apply route 2 times a day. Historical Provider, MD Taking Active   lancets (Micro Thin Lancets) 33 gauge misc [id number] Yes 1 Lancet by Does not apply route 2 times a day. Osman Coulter MD Taking Active   levothyroxine (Synthroid, Levoxyl) 112 mcg tablet [id number] Yes Take 1 tablet (112 mcg) by mouth early in the morning.. Take on an empty stomach at the same time each day, either 30 to 60 minutes prior to breakfast Historical Provider, MD Taking Active   metoprolol tartrate (Lopressor) 25 mg tablet [id number] Yes Take 1 tablet (25 mg) by mouth 2 times a day. Ludwin Burns MD Taking Active   omeprazole (PriLOSEC) 20 mg tablet,delayed release (DR/EC) EC tablet [id number] Yes Take 1 tablet (20 mg) by mouth once daily. Osman Coulter MD Taking Active   tamsulosin (Flomax) 0.4 mg 24 hr capsule [id number] Yes Take 1 capsule (0.4 mg) by mouth once daily. Osman Coulter MD Taking Active   ticagrelor (Brilinta) 90 mg tablet [id number] Yes Take 1 tablet (90 mg) by mouth 2 times a day. Basim French MD Taking Active   triamterene-hydrochlorothiazid (Dyazide) 37.5-25 mg capsule [id number] Yes Take 1 capsule by mouth once daily. Osman Coulter MD Taking Active   vit C,G-Sw-azccg-lutein-zeaxan (PreserVision AREDS-2) 250-90-40-1 mg capsule [id number] Yes Take 1 capsule by mouth 2 times a day. Historical Provider, MD Taking Active                                 Medication compliance: Yes   Uses pill box/organizer: Yes    Carries medication list: Yes     Blood Pressure Management  History of Hypertension: Yes   Medication Changes: No   Resting BP:  115/65       Heart Failure Management  Hx of Heart Failure: No    Smoking/Tobacco Assessment  Social History     Tobacco Use   Smoking Status Former     Types: Cigarettes   Smokeless Tobacco Never       Other Core Component Plan    Goal Status: In progress    Other Core Component Goals: Medication compliance, Verbalize medication usage and drug actions by discharge, and Achieve resting BP of < 130/80 by discharge    Other Core Component Interventions/Education:   Pt is taking all meds as prescribed and carries an updated medication list  Blood pressure remains within target goal  Check resting BP pre and post exercise  Encouraged to carry an updated medication list  Reviewed effects of exercise on Blood Pressure  Blood Pressure education session attended, H/O received  Individual Patient Goals:    Pt would like to exercise to prevent further cardiac events  Pt would like to decrease his snacking    Goal Status: In progress    Staff Comments:  Pt plans to attend 36 sessions of Cardiac Rehab in order to achieve his goals.   Orientation: BP R arm sitting 131/69, L arm sitting 143/77, standing 133/68, PO2 95%, HR 52 bpm. Lungs CTA posteriorly, Heart sounds S1S2 RRR - slow rate. Radial pulses 3+ bilat, no peripheral edema noted.   6 WMT: 1340 ft = 2.5 mph = 2.9 METs. RPE 11. /74, PO2 98%, HR 70 bpm. Pt walked well without ERAZO or CP, no cardiac symptoms or significant physical limitations noted.   Cool Down: /67, PO2 97%, HR 53 bpm.   07/23/24 -- 30 Day Eval -- Pt is trying to eat better and his weight is down 2#. No new lipid labs have been obtained. Pt is taking his medications as prescribed and his BP is at goal. No recent medication changes. Pt denies new stress and is aware of the use of stress management techniques. Pt is exercising for 40 min at 2.7 to 2.9 METs without cardiac complaints. Pt has had no falls and no hospitalization for cardiac issues.   Rehab Staff Signature: Yaakov Lackey RN

## 2024-07-24 ENCOUNTER — CLINICAL SUPPORT (OUTPATIENT)
Dept: CARDIAC REHAB | Facility: HOSPITAL | Age: 74
End: 2024-07-24
Payer: MEDICARE

## 2024-07-24 DIAGNOSIS — I21.4 NON-ST ELEVATION MYOCARDIAL INFARCTION (NSTEMI) (MULTI): ICD-10-CM

## 2024-07-24 PROCEDURE — 93798 PHYS/QHP OP CAR RHAB W/ECG: CPT | Performed by: INTERNAL MEDICINE

## 2024-07-26 ENCOUNTER — CLINICAL SUPPORT (OUTPATIENT)
Dept: CARDIAC REHAB | Facility: HOSPITAL | Age: 74
End: 2024-07-26
Payer: MEDICARE

## 2024-07-26 DIAGNOSIS — I21.4 NON-ST ELEVATION MYOCARDIAL INFARCTION (NSTEMI) (MULTI): ICD-10-CM

## 2024-07-26 PROCEDURE — 93798 PHYS/QHP OP CAR RHAB W/ECG: CPT | Performed by: INTERNAL MEDICINE

## 2024-07-29 ENCOUNTER — CLINICAL SUPPORT (OUTPATIENT)
Dept: CARDIAC REHAB | Facility: HOSPITAL | Age: 74
End: 2024-07-29
Payer: MEDICARE

## 2024-07-29 DIAGNOSIS — I21.4 NON-ST ELEVATION MYOCARDIAL INFARCTION (NSTEMI) (MULTI): ICD-10-CM

## 2024-07-29 PROCEDURE — 93798 PHYS/QHP OP CAR RHAB W/ECG: CPT | Performed by: INTERNAL MEDICINE

## 2024-07-31 ENCOUNTER — CLINICAL SUPPORT (OUTPATIENT)
Dept: CARDIAC REHAB | Facility: HOSPITAL | Age: 74
End: 2024-07-31
Payer: MEDICARE

## 2024-07-31 DIAGNOSIS — I21.4 NON-ST ELEVATION MYOCARDIAL INFARCTION (NSTEMI) (MULTI): ICD-10-CM

## 2024-07-31 PROCEDURE — 93798 PHYS/QHP OP CAR RHAB W/ECG: CPT | Performed by: INTERNAL MEDICINE

## 2024-08-02 ENCOUNTER — CLINICAL SUPPORT (OUTPATIENT)
Dept: CARDIAC REHAB | Facility: HOSPITAL | Age: 74
End: 2024-08-02
Payer: MEDICARE

## 2024-08-02 DIAGNOSIS — I21.4 NON-ST ELEVATION MYOCARDIAL INFARCTION (NSTEMI) (MULTI): ICD-10-CM

## 2024-08-02 PROCEDURE — 93798 PHYS/QHP OP CAR RHAB W/ECG: CPT | Performed by: INTERNAL MEDICINE

## 2024-08-05 ENCOUNTER — CLINICAL SUPPORT (OUTPATIENT)
Dept: CARDIAC REHAB | Facility: HOSPITAL | Age: 74
End: 2024-08-05
Payer: MEDICARE

## 2024-08-05 DIAGNOSIS — I21.4 NON-ST ELEVATION MYOCARDIAL INFARCTION (NSTEMI) (MULTI): ICD-10-CM

## 2024-08-05 PROCEDURE — 93798 PHYS/QHP OP CAR RHAB W/ECG: CPT | Performed by: INTERNAL MEDICINE

## 2024-08-07 ENCOUNTER — CLINICAL SUPPORT (OUTPATIENT)
Dept: CARDIAC REHAB | Facility: HOSPITAL | Age: 74
End: 2024-08-07
Payer: MEDICARE

## 2024-08-07 DIAGNOSIS — I21.4 NON-ST ELEVATION MYOCARDIAL INFARCTION (NSTEMI) (MULTI): ICD-10-CM

## 2024-08-07 PROCEDURE — 93798 PHYS/QHP OP CAR RHAB W/ECG: CPT | Performed by: INTERNAL MEDICINE

## 2024-08-09 ENCOUNTER — CLINICAL SUPPORT (OUTPATIENT)
Dept: CARDIAC REHAB | Facility: HOSPITAL | Age: 74
End: 2024-08-09
Payer: MEDICARE

## 2024-08-09 DIAGNOSIS — I21.4 NON-ST ELEVATION MYOCARDIAL INFARCTION (NSTEMI) (MULTI): ICD-10-CM

## 2024-08-09 PROCEDURE — 93798 PHYS/QHP OP CAR RHAB W/ECG: CPT | Performed by: INTERNAL MEDICINE

## 2024-08-12 ENCOUNTER — CLINICAL SUPPORT (OUTPATIENT)
Dept: CARDIAC REHAB | Facility: HOSPITAL | Age: 74
End: 2024-08-12
Payer: MEDICARE

## 2024-08-12 DIAGNOSIS — I21.4 NON-ST ELEVATION MYOCARDIAL INFARCTION (NSTEMI) (MULTI): ICD-10-CM

## 2024-08-12 PROCEDURE — 93798 PHYS/QHP OP CAR RHAB W/ECG: CPT | Performed by: INTERNAL MEDICINE

## 2024-08-14 ENCOUNTER — CLINICAL SUPPORT (OUTPATIENT)
Dept: CARDIAC REHAB | Facility: HOSPITAL | Age: 74
End: 2024-08-14
Payer: MEDICARE

## 2024-08-14 DIAGNOSIS — I21.4 NON-ST ELEVATION MYOCARDIAL INFARCTION (NSTEMI) (MULTI): ICD-10-CM

## 2024-08-14 PROCEDURE — 93798 PHYS/QHP OP CAR RHAB W/ECG: CPT | Performed by: INTERNAL MEDICINE

## 2024-08-16 ENCOUNTER — CLINICAL SUPPORT (OUTPATIENT)
Dept: CARDIAC REHAB | Facility: HOSPITAL | Age: 74
End: 2024-08-16
Payer: MEDICARE

## 2024-08-16 DIAGNOSIS — I21.4 NON-ST ELEVATION MYOCARDIAL INFARCTION (NSTEMI) (MULTI): ICD-10-CM

## 2024-08-16 PROCEDURE — 93798 PHYS/QHP OP CAR RHAB W/ECG: CPT | Performed by: INTERNAL MEDICINE

## 2024-08-19 ENCOUNTER — CLINICAL SUPPORT (OUTPATIENT)
Dept: CARDIAC REHAB | Facility: HOSPITAL | Age: 74
End: 2024-08-19
Payer: MEDICARE

## 2024-08-19 DIAGNOSIS — I21.4 NON-ST ELEVATION MYOCARDIAL INFARCTION (NSTEMI) (MULTI): ICD-10-CM

## 2024-08-19 PROCEDURE — 93798 PHYS/QHP OP CAR RHAB W/ECG: CPT | Performed by: INTERNAL MEDICINE

## 2024-08-20 ENCOUNTER — DOCUMENTATION (OUTPATIENT)
Dept: CARDIAC REHAB | Facility: HOSPITAL | Age: 74
End: 2024-08-20
Payer: MEDICARE

## 2024-08-20 NOTE — PROGRESS NOTES
Cardiac Rehabilitation 60 Day Reassessment    Name: Alfonzo Baldwin  Medical Record Number: [id number]  YOB: 1950  Age: 74 y.o.    Today’s Date: 8/20/2024  Primary Care Physician: Osman Coulter MD  Referring Physician: Dr Burns  Program Location: [facility]  Primary Diagnosis: NSTEMI I21.4     Onset/Date of Diagnosis: 06/06/2024    Session #: 18    AACVPR Risk Stratification: Moderate      Falls Risk: Low  Psychosocial Assessment   08/20/24 - 60 Day Eval -- Pt questioned re: new stress, anxiety or depressive symptoms; pt denies any new stress; pt is aware of stress reduction techniques and uses them as needed.   07/23/24 -- 30 Day Eval -- Pt questioned re: new stress, anxiety or depressive symptoms; pt denies any new stress; pt is aware of stress reduction techniques and uses them as needed. Pt attended Stress/Anxiety/Depression education session  Orientation: Pt denies stress, no hx of depression or anxiety, uses stress management techniques as needed.    PHQ-2 score (0)    Pt reported/currently experiencing stress: No  Patient uses stress management skills: Yes   History of: no history of anxiety or depression  Currently seeing a mental health provider: No  Social Support: Yes, Whom:Spouse  Quality of Life Survey:  PHQ-2  Learning Assessment:  Learning assessment/barriers: None  Preferred learning method: Reading handout  Barriers: None  Pt has some decreased vision and hearing loss    Stages of Change:Action    Psychosocial Plan    Goal Status: In progress    Psychosocial Goals: Demonstrating proper techniques for stress management and Identify social supports    Psychosocial Interventions/Education:   Encouraged attendance for stress management lecture.  Instructed patient on 3 minute breathing space  Pt attended Stress/Anxiety/Depression education session with handout    Education sessions attended/Handouts given:  Exercise  Blood Pressure  Stress/Anxiety/Depression  Heart  "Parts/Angina  Risk Factors  Metabolic Syndrome/Diabetes  Heart Failure    Initial Knowledge test returned, scored 4/15 because he did not see back of page. Reviewed w/ pt 7/1  Nutrition Assessment:  08/20/24 - 60 Day Eval -- Pt is eating better and his weight is down 1.8# since starting rehab. No new lipid labs drawn.   07/23/24 -- 30 Day Eval -- Pt is attempting to eat more heart healthy. His weight is down 2#. No new lipid labs are available. No hypoglycemic episodes.  Hyperlipidemia: Yes     Lipids:   Lab Results   Component Value Date    CHOL 127 06/08/2024    HDL 26.7 06/08/2024    LDLF 80 03/22/2022    TRIG 260 (H) 06/08/2024       Current Dietary Guidelines: Low fat, ADA  Barriers to dietary change: no    Diet Habit Survey: Block Fat Screener  Pre:  Initial Fat Screener returned 7/1, scored High (>35%). Reviewed w/ pt. AHA Healthy Fat handout given  Post: To be done at discharge.    Diabetes Assessment    Lab Results   Component Value Date    HGBA1C 7.2 (A) 06/11/2024       History of Diabetes: Yes Pt monitors BS at home: Yes   Frequency: 1 /day  Hypoglycemic Episodes: No      Nutrition Plan    Goal Status: In progress    Nutrition Goals: Lipid Goal: HDL>45, LDL <70, Total <180, Trigs <150, Learn how to read and interpret nutrition labels prior to discharge, Maintain body weight, and Check blood glucose daily    Nutrition Interventions/Education:   Booklet given  \"Heart Attack - Bouncing Back\"   Encourage patient to follow a heart healthy diet  Lipid profile reviewed    Exercise Assessment  08/20/24 - 60 Day Eval -- Pt is exercising 40 min at 3.2 to 4.8 METs without issues. No cardiac complaints.   07/23/24 -- 30 Day Eval -- Pt is exercising for 40 min at 2.7 to 2.9 METs and progressing slowly without cardiac symptoms. He attended the Exercise education session.    Home Exercise?: Yes  Mode: Working out at gym, weights, walking  Frequency: Twice per week  Duration: 40-50 min    Exercise Prescription   "   Exercise Prescription based on: 6 Minute Walk Test          Frequency:  3 days/week   Mode: Treadmill, Recumbent Cycle, Arm Ergometer, and Stepper   Duration: 40 total aerobic minutes   Intensity: RPE 11-13  Target HR:     MET Level: 3-6  Patient wears supplemental O2: No     Modality Workload METs Duration (minutes)   1 Pre-Exercise      2 Arm Ergometer 2.5  3.2 10 :00   3 Recumbent Bike 9.0 4.0 10 :00   4 Stepper 3.5  4.8 10 :00   5 Treadmill 3.0/0.5  3.5 10 :00   6 Post-Exercise        Resistance Training: Yes  08/07/24  Home Exercise Prescription given: To be given prior to discharge from program.    Exercise Plan    Goal Status: In progress    Exercise Goals: Increase exercise MET level by 5-10% each week, Increase total exercise duration to 30-45 minutes, Obtain 150 minutes/week of moderate intensity aerobic exercise, and Establish a home exercise program before discharge    Exercise Interventions/Education:   Exercise prescription based on 6 MWT  Exercise changes made based on HR & RPE scale in response to exercise  Increase duration by 1-2 minutes per modality for a total of 40 minutes per session  Target goal for duration 40 minutes per session, increase MET levels by 5-10% every 30 days or as tolerated  Encouraged home exercise on days not in rehab  Pt attended Exercise education session and received the handout      Other Core Components/Risk Factor Assessment:  08/20/24 - 60 Day Eval -- Pt is Taking his meds as prescribed, using a pill box and carrying a medication list. His BP is at goal at this time.   07/23/24 -- 30 Day Eval -- Pt is taking meds as prescribed and BP is at goal. Pt attended Blood Pressure education session and received the handout. No recent medication changes.  Medication adherence  Current Medications:   Medication Documentation Review Audit       Reviewed by Quiana Chand MA (Medical Assistant) on 07/22/24 at 0950      Medication Order Taking? Sig Documenting Provider  Last Dose Status   acetaminophen (Tylenol) 500 mg tablet [id number] Yes 2 tab(s) orally as needed Historical Provider, MD Taking Active   aspirin 81 mg EC tablet [id number] Yes Take 1 tablet (81 mg) by mouth once daily. Basim French MD Taking Active   atorvastatin (Lipitor) 80 mg tablet [id number] Yes Take 1 tablet (80 mg) by mouth once daily. Basim French MD Taking Active   blood sugar diagnostic (TRUETEST TEST STRIPS MISC) [id number] Yes  Historical Provider, MD Taking Active   dapagliflozin propanediol (Farxiga) 10 mg [id number] Yes Take 1 tablet (10 mg) by mouth once daily. Laura L Seaver, APRN-CNP Taking Active   finasteride (Proscar) 5 mg tablet [id number] Yes Take 1 tablet (5 mg) by mouth once daily. Do not crush, chew, or split. Osman Coulter MD Taking Active   FreeStyle Lancets 28 gauge [id number] Yes by Does not apply route 2 times a day. Historical Provider, MD Taking Active   lancets (Micro Thin Lancets) 33 gauge misc [id number] Yes 1 Lancet by Does not apply route 2 times a day. Osman Coulter MD Taking Active   levothyroxine (Synthroid, Levoxyl) 112 mcg tablet [id number] Yes Take 1 tablet (112 mcg) by mouth early in the morning.. Take on an empty stomach at the same time each day, either 30 to 60 minutes prior to breakfast Historical Provider, MD Taking Active   metoprolol tartrate (Lopressor) 25 mg tablet [id number] Yes Take 1 tablet (25 mg) by mouth 2 times a day. Ludwin Burns MD Taking Active   omeprazole (PriLOSEC) 20 mg tablet,delayed release (DR/EC) EC tablet [id number] Yes Take 1 tablet (20 mg) by mouth once daily. Osman Coulter MD Taking Active   tamsulosin (Flomax) 0.4 mg 24 hr capsule [id number] Yes Take 1 capsule (0.4 mg) by mouth once daily. Osman Coulter MD Taking Active   ticagrelor (Brilinta) 90 mg tablet [id number] Yes Take 1 tablet (90 mg) by mouth 2 times a day. Basim French MD Taking Active   triamterene-hydrochlorothiazid (Dyazide) 37.5-25 mg capsule [id number] Yes Take 1 capsule  by mouth once daily. Osman Coulter MD Taking Active   vit C,P-Pe-ncisp-lutein-zeaxan (PreserVision AREDS-2) 250-90-40-1 mg capsule [id number] Yes Take 1 capsule by mouth 2 times a day. Historical Provider, MD Taking Active                                 Medication compliance: Yes   Uses pill box/organizer: Yes    Carries medication list: Yes     Blood Pressure Management  History of Hypertension: Yes   Medication Changes: No   Resting BP:  122/64       Heart Failure Management  Hx of Heart Failure: No    Smoking/Tobacco Assessment  Social History     Tobacco Use   Smoking Status Former    Types: Cigarettes   Smokeless Tobacco Never       Other Core Component Plan    Goal Status: In progress    Other Core Component Goals: Medication compliance, Verbalize medication usage and drug actions by discharge, and Achieve resting BP of < 130/80 by discharge    Other Core Component Interventions/Education:   Pt is taking all meds as prescribed and carries an updated medication list  Blood pressure remains within target goal  Check resting BP pre and post exercise  Encouraged to carry an updated medication list  Reviewed effects of exercise on Blood Pressure  Blood Pressure education session attended, H/O received  Individual Patient Goals:    Pt would like to exercise to prevent further cardiac events  Pt would like to decrease his snacking    Goal Status: In progress    Staff Comments:  Pt plans to attend 36 sessions of Cardiac Rehab in order to achieve his goals.   Orientation: BP R arm sitting 131/69, L arm sitting 143/77, standing 133/68, PO2 95%, HR 52 bpm. Lungs CTA posteriorly, Heart sounds S1S2 RRR - slow rate. Radial pulses 3+ bilat, no peripheral edema noted.   6 WMT: 1340 ft = 2.5 mph = 2.9 METs. RPE 11. /74, PO2 98%, HR 70 bpm. Pt walked well without ERAZO or CP, no cardiac symptoms or significant physical limitations noted.   Cool Down: /67, PO2 97%, HR 53 bpm.   07/23/24 -- 30 Day Eval -- Pt is trying  to eat better and his weight is down 2#. No new lipid labs have been obtained. Pt is taking his medications as prescribed and his BP is at goal. No recent medication changes. Pt denies new stress and is aware of the use of stress management techniques. Pt is exercising for 40 min at 2.7 to 2.9 METs without cardiac complaints. Pt has had no falls and no hospitalization for cardiac issues.   08/20/24 - 60 Day Eval -- Pt is working on a more heart healthy diet and his weight is down 1.8#. No new lipid labs available. Pt is taking his medications as prescribed and his BP is at goal. Pt denies new stress/anxiety/depression and is aware of the use of stress management techniques. He is exercising for 40 min at 3.2 to 4.8 METs and tolerating rehab exercise well. He denies falling, hospitalization for cardiac symptoms or medication changes.   Rehab Staff Signature: Yaakov Lackey RN

## 2024-08-21 ENCOUNTER — CLINICAL SUPPORT (OUTPATIENT)
Dept: CARDIAC REHAB | Facility: HOSPITAL | Age: 74
End: 2024-08-21
Payer: MEDICARE

## 2024-08-21 DIAGNOSIS — I21.4 NON-ST ELEVATION MYOCARDIAL INFARCTION (NSTEMI) (MULTI): ICD-10-CM

## 2024-08-21 PROCEDURE — 93798 PHYS/QHP OP CAR RHAB W/ECG: CPT | Performed by: INTERNAL MEDICINE

## 2024-08-23 ENCOUNTER — CLINICAL SUPPORT (OUTPATIENT)
Dept: CARDIAC REHAB | Facility: HOSPITAL | Age: 74
End: 2024-08-23
Payer: MEDICARE

## 2024-08-23 DIAGNOSIS — I21.4 NON-ST ELEVATION MYOCARDIAL INFARCTION (NSTEMI) (MULTI): ICD-10-CM

## 2024-08-23 PROCEDURE — 93798 PHYS/QHP OP CAR RHAB W/ECG: CPT | Performed by: INTERNAL MEDICINE

## 2024-08-26 ENCOUNTER — CLINICAL SUPPORT (OUTPATIENT)
Dept: CARDIAC REHAB | Facility: HOSPITAL | Age: 74
End: 2024-08-26
Payer: MEDICARE

## 2024-08-26 DIAGNOSIS — I21.4 NON-ST ELEVATION MYOCARDIAL INFARCTION (NSTEMI) (MULTI): ICD-10-CM

## 2024-08-26 PROCEDURE — 93798 PHYS/QHP OP CAR RHAB W/ECG: CPT | Performed by: INTERNAL MEDICINE

## 2024-08-28 ENCOUNTER — CLINICAL SUPPORT (OUTPATIENT)
Dept: CARDIAC REHAB | Facility: HOSPITAL | Age: 74
End: 2024-08-28
Payer: MEDICARE

## 2024-08-28 DIAGNOSIS — I21.4 NON-ST ELEVATION MYOCARDIAL INFARCTION (NSTEMI) (MULTI): ICD-10-CM

## 2024-08-28 PROCEDURE — 93798 PHYS/QHP OP CAR RHAB W/ECG: CPT | Performed by: INTERNAL MEDICINE

## 2024-08-30 ENCOUNTER — CLINICAL SUPPORT (OUTPATIENT)
Dept: CARDIAC REHAB | Facility: HOSPITAL | Age: 74
End: 2024-08-30
Payer: MEDICARE

## 2024-08-30 DIAGNOSIS — I21.4 NON-ST ELEVATION MYOCARDIAL INFARCTION (NSTEMI) (MULTI): ICD-10-CM

## 2024-08-30 PROCEDURE — 93798 PHYS/QHP OP CAR RHAB W/ECG: CPT | Performed by: INTERNAL MEDICINE

## 2024-09-04 ENCOUNTER — CLINICAL SUPPORT (OUTPATIENT)
Dept: CARDIAC REHAB | Facility: HOSPITAL | Age: 74
End: 2024-09-04
Payer: MEDICARE

## 2024-09-04 DIAGNOSIS — I21.4 NON-ST ELEVATION MYOCARDIAL INFARCTION (NSTEMI) (MULTI): ICD-10-CM

## 2024-09-04 PROCEDURE — 93798 PHYS/QHP OP CAR RHAB W/ECG: CPT | Performed by: INTERNAL MEDICINE

## 2024-09-06 ENCOUNTER — CLINICAL SUPPORT (OUTPATIENT)
Dept: CARDIAC REHAB | Facility: HOSPITAL | Age: 74
End: 2024-09-06
Payer: MEDICARE

## 2024-09-06 DIAGNOSIS — I21.4 NON-ST ELEVATION MYOCARDIAL INFARCTION (NSTEMI) (MULTI): ICD-10-CM

## 2024-09-06 PROCEDURE — 93798 PHYS/QHP OP CAR RHAB W/ECG: CPT | Performed by: INTERNAL MEDICINE

## 2024-09-09 ENCOUNTER — CLINICAL SUPPORT (OUTPATIENT)
Dept: CARDIAC REHAB | Facility: HOSPITAL | Age: 74
End: 2024-09-09
Payer: MEDICARE

## 2024-09-09 DIAGNOSIS — I21.4 NON-ST ELEVATION MYOCARDIAL INFARCTION (NSTEMI) (MULTI): ICD-10-CM

## 2024-09-09 PROCEDURE — 93798 PHYS/QHP OP CAR RHAB W/ECG: CPT | Performed by: INTERNAL MEDICINE

## 2024-09-11 ENCOUNTER — CLINICAL SUPPORT (OUTPATIENT)
Dept: CARDIAC REHAB | Facility: HOSPITAL | Age: 74
End: 2024-09-11
Payer: MEDICARE

## 2024-09-11 ENCOUNTER — APPOINTMENT (OUTPATIENT)
Dept: PRIMARY CARE | Facility: CLINIC | Age: 74
End: 2024-09-11
Payer: COMMERCIAL

## 2024-09-11 DIAGNOSIS — I21.4 NON-ST ELEVATION MYOCARDIAL INFARCTION (NSTEMI) (MULTI): ICD-10-CM

## 2024-09-11 PROCEDURE — 93798 PHYS/QHP OP CAR RHAB W/ECG: CPT | Performed by: INTERNAL MEDICINE

## 2024-09-12 ENCOUNTER — APPOINTMENT (OUTPATIENT)
Dept: PRIMARY CARE | Facility: CLINIC | Age: 74
End: 2024-09-12
Payer: MEDICARE

## 2024-09-12 VITALS
BODY MASS INDEX: 22.45 KG/M2 | HEART RATE: 50 BPM | DIASTOLIC BLOOD PRESSURE: 66 MMHG | TEMPERATURE: 96.3 F | SYSTOLIC BLOOD PRESSURE: 113 MMHG | WEIGHT: 161 LBS | RESPIRATION RATE: 16 BRPM

## 2024-09-12 DIAGNOSIS — I10 PRIMARY HYPERTENSION: ICD-10-CM

## 2024-09-12 DIAGNOSIS — N18.9 CHRONIC KIDNEY DISEASE, UNSPECIFIED CKD STAGE: ICD-10-CM

## 2024-09-12 DIAGNOSIS — I21.4 NSTEMI (NON-ST ELEVATED MYOCARDIAL INFARCTION) (MULTI): ICD-10-CM

## 2024-09-12 DIAGNOSIS — E03.9 HYPOTHYROIDISM, UNSPECIFIED TYPE: ICD-10-CM

## 2024-09-12 DIAGNOSIS — E11.65 TYPE 2 DIABETES MELLITUS WITH HYPERGLYCEMIA, WITHOUT LONG-TERM CURRENT USE OF INSULIN (MULTI): Primary | ICD-10-CM

## 2024-09-12 DIAGNOSIS — E21.3 HYPERPARATHYROIDISM (MULTI): ICD-10-CM

## 2024-09-12 LAB
POC FINGERSTICK BLOOD GLUCOSE: 94 MG/DL (ref 70–100)
POC HEMOGLOBIN A1C: 6.8 % (ref 4.2–6.5)

## 2024-09-12 PROCEDURE — 1036F TOBACCO NON-USER: CPT | Performed by: NURSE PRACTITIONER

## 2024-09-12 PROCEDURE — 3078F DIAST BP <80 MM HG: CPT | Performed by: NURSE PRACTITIONER

## 2024-09-12 PROCEDURE — 82962 GLUCOSE BLOOD TEST: CPT | Performed by: NURSE PRACTITIONER

## 2024-09-12 PROCEDURE — 3074F SYST BP LT 130 MM HG: CPT | Performed by: NURSE PRACTITIONER

## 2024-09-12 PROCEDURE — 99214 OFFICE O/P EST MOD 30 MIN: CPT | Performed by: NURSE PRACTITIONER

## 2024-09-12 PROCEDURE — 1159F MED LIST DOCD IN RCRD: CPT | Performed by: NURSE PRACTITIONER

## 2024-09-12 PROCEDURE — 83036 HEMOGLOBIN GLYCOSYLATED A1C: CPT | Performed by: NURSE PRACTITIONER

## 2024-09-12 RX ORDER — LEVOTHYROXINE SODIUM 100 UG/1
100 TABLET ORAL DAILY
COMMUNITY

## 2024-09-12 ASSESSMENT — ENCOUNTER SYMPTOMS
PSYCHIATRIC NEGATIVE: 1
MUSCULOSKELETAL NEGATIVE: 1
NEUROLOGICAL NEGATIVE: 1
RESPIRATORY NEGATIVE: 1
CARDIOVASCULAR NEGATIVE: 1
GASTROINTESTINAL NEGATIVE: 1
ENDOCRINE NEGATIVE: 1
CONSTITUTIONAL NEGATIVE: 1

## 2024-09-12 NOTE — PROGRESS NOTES
Alfonzo Baldwin is a 74 y.o. here for a diabetic follow up exam.     Pt states they are doing well. Following a low carbohydrate diet and is active.     Up to date with eye and foot exams, pcp visits.     MI sees dr Burns cardiology  6/24 - doing well no s/s  Dr rahul rangel and dr Julien for the uro care  Dr pearson nephshan yearly     Last wt 163  - 161 today   Last bmi 22.85  Last aic - 7.2 (7.2, 7.8) - 6.8 today under goal    Meds:  Farxiga 10mg daily    [facility]    Due to see dr altagracia sullivan. -     Contacted the  clin pharm to help with bredia and jardiance.         Lab Results   Component Value Date    POCGLU 94 09/12/2024    POCGLU 113 (A) 06/11/2024    POCGLU 215 (A) 03/08/2024    POCGLU 172 (A) 12/08/2023    HGBA1C 6.8 (A) 09/12/2024    HGBA1C 7.2 (A) 06/11/2024    HGBA1C 7.2 (A) 03/08/2024    HGBA1C 7.8 (A) 12/08/2023    HGBA1C 5.9 06/19/2020     /66 (BP Location: Right arm, Patient Position: Sitting, BP Cuff Size: Adult)   Pulse 50   Temp 35.7 °C (96.3 °F) (Temporal)   Resp 16   Wt 73 kg (161 lb)   BMI 22.45 kg/m²    Wt Readings from Last 5 Encounters:   09/12/24 73 kg (161 lb)   06/25/24 74.3 kg (163 lb 12.8 oz)   06/11/24 74.8 kg (165 lb)   06/11/24 74.8 kg (165 lb)   06/07/24 74.4 kg (164 lb)          Review of Systems   Constitutional: Negative.    HENT: Negative.     Respiratory: Negative.     Cardiovascular: Negative.    Gastrointestinal: Negative.    Endocrine: Negative.    Genitourinary: Negative.    Musculoskeletal: Negative.    Skin: Negative.    Neurological: Negative.    Psychiatric/Behavioral: Negative.          Physical Exam  Vitals and nursing note reviewed.   Constitutional:       General: He is not in acute distress.     Appearance: Normal appearance.   HENT:      Head: Normocephalic and atraumatic.   Eyes:      Extraocular Movements: Extraocular movements intact.      Pupils: Pupils are equal, round, and reactive to  light.   Cardiovascular:      Rate and Rhythm: Normal rate and regular rhythm.   Pulmonary:      Effort: Pulmonary effort is normal.      Breath sounds: Normal breath sounds.   Abdominal:      Palpations: Abdomen is soft.   Skin:     General: Skin is warm and dry.   Neurological:      General: No focal deficit present.      Mental Status: He is alert and oriented to person, place, and time.   Psychiatric:         Mood and Affect: Mood normal.         Behavior: Behavior normal.      Problem List Items Addressed This Visit             ICD-10-CM    Hypertension I10    Hyperparathyroidism (Multi) E21.3    Hypothyroid E03.9    Chronic kidney disease N18.9    NSTEMI (non-ST elevated myocardial infarction) (Multi) I21.4    Type 2 diabetes mellitus (Multi) - Primary E11.9    Relevant Orders    POCT glycosylated hemoglobin (Hb A1C) manually resulted (Completed)    POCT fingerstick glucose manually resulted (Completed)      Laura L Seaver, APRN-CNP

## 2024-09-13 ENCOUNTER — CLINICAL SUPPORT (OUTPATIENT)
Dept: CARDIAC REHAB | Facility: HOSPITAL | Age: 74
End: 2024-09-13
Payer: MEDICARE

## 2024-09-13 DIAGNOSIS — I21.4 NON-ST ELEVATION MYOCARDIAL INFARCTION (NSTEMI) (MULTI): ICD-10-CM

## 2024-09-13 PROCEDURE — 93798 PHYS/QHP OP CAR RHAB W/ECG: CPT | Performed by: INTERNAL MEDICINE

## 2024-09-16 ENCOUNTER — CLINICAL SUPPORT (OUTPATIENT)
Dept: CARDIAC REHAB | Facility: HOSPITAL | Age: 74
End: 2024-09-16
Payer: MEDICARE

## 2024-09-16 DIAGNOSIS — E11.9 TYPE 2 DIABETES MELLITUS WITHOUT COMPLICATION, WITHOUT LONG-TERM CURRENT USE OF INSULIN (MULTI): Primary | ICD-10-CM

## 2024-09-16 DIAGNOSIS — I21.4 NON-ST ELEVATION MYOCARDIAL INFARCTION (NSTEMI) (MULTI): ICD-10-CM

## 2024-09-16 PROCEDURE — 93798 PHYS/QHP OP CAR RHAB W/ECG: CPT | Performed by: INTERNAL MEDICINE

## 2024-09-17 ENCOUNTER — DOCUMENTATION (OUTPATIENT)
Dept: CARDIAC REHAB | Facility: HOSPITAL | Age: 74
End: 2024-09-17
Payer: MEDICARE

## 2024-09-17 NOTE — PROGRESS NOTES
Cardiac Rehabilitation 90 Day Reassessment    Name: Alfonzo Baldwin  Medical Record Number: [id number]  YOB: 1950  Age: 74 y.o.    Today’s Date: 9/17/2024  Primary Care Physician: Osman Coulter MD  Referring Physician: Dr Burns  Program Location: [facility]  Primary Diagnosis: NSTEMI I21.4     Onset/Date of Diagnosis: 06/06/2024    Session #: 29    AACVPR Risk Stratification: Moderate      Falls Risk: Low  Psychosocial Assessment   09/17/2024 - 90 Day Eval -- Pt questioned re: new stress, anxiety or depressive symptoms; pt denies any new stress; pt is aware of stress reduction techniques and uses them as needed.   08/20/24 - 60 Day Eval -- Pt questioned re: new stress, anxiety or depressive symptoms; pt denies any new stress; pt is aware of stress reduction techniques and uses them as needed.   07/23/24 -- 30 Day Eval -- Pt questioned re: new stress, anxiety or depressive symptoms; pt denies any new stress; pt is aware of stress reduction techniques and uses them as needed. Pt attended Stress/Anxiety/Depression education session  Orientation: Pt denies stress, no hx of depression or anxiety, uses stress management techniques as needed.    PHQ-2 score (0)    Pt reported/currently experiencing stress: No  Patient uses stress management skills: Yes   History of: no history of anxiety or depression  Currently seeing a mental health provider: No  Social Support: Yes, Whom:Spouse  Quality of Life Survey:  PHQ-2  Learning Assessment:  Learning assessment/barriers: None  Preferred learning method: Reading handout  Barriers: None  Pt has some decreased vision and hearing loss    Stages of Change:Action    Psychosocial Plan    Goal Status: In progress    Psychosocial Goals: Demonstrating proper techniques for stress management and Identify social supports    Psychosocial Interventions/Education:   Encouraged attendance for stress management lecture.  Instructed patient on 3 minute breathing  "space  Pt attended Stress/Anxiety/Depression education session with handout    Education sessions attended/Handouts given:  Exercise  Blood Pressure  Stress/Anxiety/Depression  Heart Parts/Angina  Risk Factors  Metabolic Syndrome/Diabetes  Heart Failure  Healthy Eating  Blood Pressure  Pt has attended all offered education sessions and received the handouts.    Initial Knowledge test returned, scored 4/15 because he did not see back of page. Reviewed w/ pt 7/1  Nutrition Assessment:  09/17/2024 - 90 Day Eval -- Pt is eating well and his weight is down 3.2# since he stared Cardiac Rehab. No new lipid labs drawn. Pt has attended Risk Factors, Metabolic Syndrome/Diabetes, Healthy Eating and Cholesterol education sessions with handouts.  08/20/24 - 60 Day Eval -- Pt is eating better and his weight is down 1.8# since starting rehab. No new lipid labs drawn.   07/23/24 -- 30 Day Eval -- Pt is attempting to eat more heart healthy. His weight is down 2#. No new lipid labs are available. No hypoglycemic episodes.  Hyperlipidemia: Yes     Lipids:   Lab Results   Component Value Date    CHOL 127 06/08/2024    HDL 26.7 06/08/2024    LDLF 80 03/22/2022    TRIG 260 (H) 06/08/2024       Current Dietary Guidelines: Low fat, ADA  Barriers to dietary change: no    Diet Habit Survey: Block Fat Screener  Pre:  Initial Fat Screener returned 7/1, scored High (>35%). Reviewed w/ pt. AHA Healthy Fat handout given  Post: To be done at discharge.    Diabetes Assessment    Lab Results   Component Value Date    HGBA1C 6.8 (A) 09/12/2024       History of Diabetes: Yes Pt monitors BS at home: Yes   Frequency: 1 /day  Hypoglycemic Episodes: No      Nutrition Plan    Goal Status: In progress    Nutrition Goals: Lipid Goal: HDL>45, LDL <70, Total <180, Trigs <150, Learn how to read and interpret nutrition labels prior to discharge, Maintain body weight, and Check blood glucose daily    Nutrition Interventions/Education:   Booklet given  \"Heart " "Attack - Bouncing Back\"   Encourage patient to follow a heart healthy diet  Lipid profile reviewed    Exercise Assessment  09/17/2024 - 90 Day Eval -- Pt is exercising for 40 min at 4.0 to 4.8 METs and tolerating it well.   08/20/24 - 60 Day Eval -- Pt is exercising 40 min at 3.2 to 4.8 METs without issues. No cardiac complaints.   07/23/24 -- 30 Day Eval -- Pt is exercising for 40 min at 2.7 to 2.9 METs and progressing slowly without cardiac symptoms. He attended the Exercise education session.    Home Exercise?: Yes  Mode: Working out at gym, [email or web address], walking  Frequency: Twice per week  Duration: 40-50 min    Exercise Prescription     Exercise Prescription based on: 6 Minute Walk Test          Frequency:  3 days/week   Mode: Treadmill, Recumbent Cycle, Arm Ergometer, and Stepper   Duration: 40 total aerobic minutes   Intensity: RPE 11-13  Target HR:     MET Level: 3-6  Patient wears supplemental O2: No     Modality Workload METs Duration (minutes)   1 Pre-Exercise      2 Arm Ergometer 3.5  4.0 10 :00   3 Recumbent Bike 12 4.5 10 :00   4 Stepper 5.0 4.8 10 :00   5 Treadmill 3.0/2.0  4.2 10 :00   6 Post-Exercise        Resistance Training: Yes  08/07/24  Home Exercise Prescription given: To be given prior to discharge from program.    Exercise Plan    Goal Status: In progress    Exercise Goals: Increase exercise MET level by 5-10% each week, Increase total exercise duration to 30-45 minutes, Obtain 150 minutes/week of moderate intensity aerobic exercise, and Establish a home exercise program before discharge    Exercise Interventions/Education:   Exercise prescription based on 6 MWT  Exercise changes made based on HR & RPE scale in response to exercise  Increase duration by 1-2 minutes per modality for a total of 40 minutes per session  Target goal for duration 40 minutes per session, increase MET levels by 5-10% every 30 days or as tolerated  Encouraged home exercise on days not in rehab  Pt attended Exercise " education session and received the handout      Other Core Components/Risk Factor Assessment:  09/17/2024 - 90 Day Eval -- Pt is Taking his meds as prescribed, using a pill box and carrying a medication list. His BP is at goal at this time.   08/20/24 - 60 Day Eval -- Pt is Taking his meds as prescribed, using a pill box and carrying a medication list. His BP is at goal at this time.   07/23/24 -- 30 Day Eval -- Pt is taking meds as prescribed and BP is at goal. Pt attended Blood Pressure, Heart Failure, Angina education sessions and received the handouts. No recent medication changes.  Medication adherence  Current Medications:   Medication Documentation Review Audit       Reviewed by Bailey Patino MA (Medical Assistant) on 09/12/24 at 1302      Medication Order Taking? Sig Documenting Provider Last Dose Status   acetaminophen (Tylenol) 500 mg tablet [id number]  2 tab(s) orally as needed Historical Provider, MD  Active   aspirin 81 mg EC tablet [id number] Yes Take 1 tablet (81 mg) by mouth once daily. Basim French MD Taking Active   atorvastatin (Lipitor) 80 mg tablet [id number] Yes Take 1 tablet (80 mg) by mouth once daily. Basim French MD Taking Active   blood sugar diagnostic (TRUETEST TEST STRIPS MISC) [id number]   Historical Provider, MD  Active   dapagliflozin propanediol (Farxiga) 10 mg [id number] Yes Take 1 tablet (10 mg) by mouth once daily. Laura L Seaver, APRN-CNP Taking Active   finasteride (Proscar) 5 mg tablet [id number] Yes Take 1 tablet (5 mg) by mouth once daily. Do not crush, chew, or split. Osman Coulter MD Taking Active   FreeStyle Lancets 28 gauge [id number]  by Does not apply route 2 times a day. Historical Provider, MD  Active   lancets (Micro Thin Lancets) 33 gauge misc [id number]  1 Lancet by Does not apply route 2 times a day. Osman Coulter MD  Active   levothyroxine (Synthroid, Levoxyl) 100 mcg tablet [id number] Yes Take 1 tablet (100 mcg) by mouth early in the morning.. Take on an empty  stomach at the same time each day, either 30 to 60 minutes prior to breakfast Historical Provider, MD Taking Active     Discontinued 09/12/24 1302   metoprolol tartrate (Lopressor) 25 mg tablet [id number] Yes Take 1 tablet (25 mg) by mouth 2 times a day. Ludwin Burns MD Taking Active   omeprazole (PriLOSEC) 20 mg tablet,delayed release (DR/EC) EC tablet [id number] Yes Take 1 tablet (20 mg) by mouth once daily. Osman Coulter MD Taking Active   tamsulosin (Flomax) 0.4 mg 24 hr capsule [id number] Yes Take 1 capsule (0.4 mg) by mouth once daily. Osman Coulter MD Taking Active   ticagrelor (Brilinta) 90 mg tablet [id number] Yes Take 1 tablet (90 mg) by mouth 2 times a day. Basim French MD Taking Active   triamterene-hydrochlorothiazid (Dyazide) 37.5-25 mg capsule [id number] Yes Take 1 capsule by mouth once daily. Osman Coulter MD Taking Active   vit C,L-Cc-plzlm-lutein-zeaxan (PreserVision AREDS-2) 250-90-40-1 mg capsule [id number]  Take 1 capsule by mouth 2 times a day. Historical Provider, MD  Active                                 Medication compliance: Yes   Uses pill box/organizer: Yes    Carries medication list: Yes     Blood Pressure Management  History of Hypertension: Yes   Medication Changes: No   Resting BP:  131/74       Heart Failure Management  Hx of Heart Failure: No    Smoking/Tobacco Assessment  Social History     Tobacco Use   Smoking Status Former    Types: Cigarettes   Smokeless Tobacco Never       Other Core Component Plan    Goal Status: In progress    Other Core Component Goals: Medication compliance, Verbalize medication usage and drug actions by discharge, and Achieve resting BP of < 130/80 by discharge    Other Core Component Interventions/Education:   Pt is taking all meds as prescribed and carries an updated medication list  Blood pressure remains within target goal  Check resting BP pre and post exercise  Encouraged to carry an updated medication list  Reviewed effects of exercise on  Blood Pressure  Blood Pressure education session attended, H/O received  Individual Patient Goals:    Pt would like to exercise to prevent further cardiac events  Pt would like to decrease his snacking    Goal Status: In progress    Staff Comments:  Pt plans to attend 36 sessions of Cardiac Rehab in order to achieve his goals.   Orientation: BP R arm sitting 131/69, L arm sitting 143/77, standing 133/68, PO2 95%, HR 52 bpm. Lungs CTA posteriorly, Heart sounds S1S2 RRR - slow rate. Radial pulses 3+ bilat, no peripheral edema noted.   6 WMT: 1340 ft = 2.5 mph = 2.9 METs. RPE 11. /74, PO2 98%, HR 70 bpm. Pt walked well without ERAZO or CP, no cardiac symptoms or significant physical limitations noted.   Cool Down: /67, PO2 97%, HR 53 bpm.   07/23/24 -- 30 Day Eval -- Pt is trying to eat better and his weight is down 2#. No new lipid labs have been obtained. Pt is taking his medications as prescribed and his BP is at goal. No recent medication changes. Pt denies new stress and is aware of the use of stress management techniques. Pt is exercising for 40 min at 2.7 to 2.9 METs without cardiac complaints. Pt has had no falls and no hospitalization for cardiac issues.   08/20/24 - 60 Day Eval -- Pt is working on a more heart healthy diet and his weight is down 1.8#. No new lipid labs available. Pt is taking his medications as prescribed and his BP is at goal. Pt denies new stress/anxiety/depression and is aware of the use of stress management techniques. He is exercising for 40 min at 3.2 to 4.8 METs and tolerating rehab exercise well. He denies falling, hospitalization for cardiac symptoms or medication changes.   09/17/2024 - 90 Day Eval -- Pt continues to strive for a heart healthy diet and has lost 3.2#. No new lipid labs are available. He is taking his meds as prescribed and his BP is at goal. Pt denies new stress and is aware of stress management techniques and their use. He is exercising 40 min per session  now up to 4.0 to 4.8 METs without cardiac complaints. He denies falls, med changes, hospitalization for cardiac issues.   Rehab Staff Signature: Yaakvo Lackey RN

## 2024-09-18 ENCOUNTER — CLINICAL SUPPORT (OUTPATIENT)
Dept: CARDIAC REHAB | Facility: HOSPITAL | Age: 74
End: 2024-09-18
Payer: MEDICARE

## 2024-09-18 DIAGNOSIS — I21.4 NON-ST ELEVATION MYOCARDIAL INFARCTION (NSTEMI) (MULTI): ICD-10-CM

## 2024-09-18 PROCEDURE — 93798 PHYS/QHP OP CAR RHAB W/ECG: CPT | Performed by: INTERNAL MEDICINE

## 2024-09-20 ENCOUNTER — CLINICAL SUPPORT (OUTPATIENT)
Dept: CARDIAC REHAB | Facility: HOSPITAL | Age: 74
End: 2024-09-20
Payer: MEDICARE

## 2024-09-20 DIAGNOSIS — I21.4 NON-ST ELEVATION MYOCARDIAL INFARCTION (NSTEMI) (MULTI): ICD-10-CM

## 2024-09-20 PROCEDURE — 93798 PHYS/QHP OP CAR RHAB W/ECG: CPT | Performed by: INTERNAL MEDICINE

## 2024-09-23 ENCOUNTER — APPOINTMENT (OUTPATIENT)
Dept: CARDIAC REHAB | Facility: HOSPITAL | Age: 74
End: 2024-09-23
Payer: MEDICARE

## 2024-09-25 ENCOUNTER — CLINICAL SUPPORT (OUTPATIENT)
Dept: CARDIAC REHAB | Facility: HOSPITAL | Age: 74
End: 2024-09-25
Payer: MEDICARE

## 2024-09-25 DIAGNOSIS — I21.4 NON-ST ELEVATION MYOCARDIAL INFARCTION (NSTEMI) (MULTI): ICD-10-CM

## 2024-09-25 PROCEDURE — 93798 PHYS/QHP OP CAR RHAB W/ECG: CPT | Performed by: INTERNAL MEDICINE

## 2024-09-27 ENCOUNTER — CLINICAL SUPPORT (OUTPATIENT)
Dept: CARDIAC REHAB | Facility: HOSPITAL | Age: 74
End: 2024-09-27
Payer: MEDICARE

## 2024-09-27 DIAGNOSIS — I21.4 NON-ST ELEVATION MYOCARDIAL INFARCTION (NSTEMI) (MULTI): ICD-10-CM

## 2024-09-27 PROCEDURE — 93798 PHYS/QHP OP CAR RHAB W/ECG: CPT | Performed by: INTERNAL MEDICINE

## 2024-09-30 ENCOUNTER — CLINICAL SUPPORT (OUTPATIENT)
Dept: CARDIAC REHAB | Facility: HOSPITAL | Age: 74
End: 2024-09-30
Payer: MEDICARE

## 2024-09-30 ENCOUNTER — TELEPHONE (OUTPATIENT)
Dept: PRIMARY CARE | Facility: CLINIC | Age: 74
End: 2024-09-30
Payer: MEDICARE

## 2024-09-30 DIAGNOSIS — I21.4 NON-ST ELEVATION MYOCARDIAL INFARCTION (NSTEMI) (MULTI): ICD-10-CM

## 2024-09-30 PROCEDURE — 93798 PHYS/QHP OP CAR RHAB W/ECG: CPT | Performed by: INTERNAL MEDICINE

## 2024-10-02 ENCOUNTER — CLINICAL SUPPORT (OUTPATIENT)
Dept: CARDIAC REHAB | Facility: HOSPITAL | Age: 74
End: 2024-10-02
Payer: MEDICARE

## 2024-10-02 DIAGNOSIS — I21.4 NON-ST ELEVATION MYOCARDIAL INFARCTION (NSTEMI) (MULTI): ICD-10-CM

## 2024-10-02 PROCEDURE — 93798 PHYS/QHP OP CAR RHAB W/ECG: CPT | Performed by: INTERNAL MEDICINE

## 2024-10-04 ENCOUNTER — CLINICAL SUPPORT (OUTPATIENT)
Dept: CARDIAC REHAB | Facility: HOSPITAL | Age: 74
End: 2024-10-04
Payer: MEDICARE

## 2024-10-04 DIAGNOSIS — I21.4 NON-ST ELEVATION MYOCARDIAL INFARCTION (NSTEMI) (MULTI): ICD-10-CM

## 2024-10-04 PROCEDURE — 93798 PHYS/QHP OP CAR RHAB W/ECG: CPT | Performed by: INTERNAL MEDICINE

## 2024-10-07 ENCOUNTER — APPOINTMENT (OUTPATIENT)
Dept: CARDIAC REHAB | Facility: HOSPITAL | Age: 74
End: 2024-10-07
Payer: MEDICARE

## 2024-10-07 DIAGNOSIS — I21.4 NON-ST ELEVATION MYOCARDIAL INFARCTION (NSTEMI) (MULTI): ICD-10-CM

## 2024-10-07 PROCEDURE — 93798 PHYS/QHP OP CAR RHAB W/ECG: CPT | Performed by: INTERNAL MEDICINE

## 2024-10-07 NOTE — PROGRESS NOTES
Cardiac Rehabilitation Discharge Summary    Name: Alfonzo Baldwin  Medical Record Number: [id number]  YOB: 1950  Age: 74 y.o.    Today’s Date: 10/7/2024  Primary Care Physician: Osman Coulter MD  Referring Physician: Dr Burns  Program Location: [facility]  Primary Diagnosis: NSTEMI I21.4     Onset/Date of Diagnosis: 06/06/2024    Session #: 37    AACVPR Risk Stratification: Moderate      Falls Risk: Low  Psychosocial Assessment   10/07/2024 - Graduation Eval -- Pt questioned re: new stress, anxiety or depressive symptoms; pt denies any new stress; pt is aware of stress reduction techniques and uses them as needed.   09/17/2024 - 90 Day Eval -- Pt questioned re: new stress, anxiety or depressive symptoms; pt denies any new stress; pt is aware of stress reduction techniques and uses them as needed.   08/20/24 - 60 Day Eval -- Pt questioned re: new stress, anxiety or depressive symptoms; pt denies any new stress; pt is aware of stress reduction techniques and uses them as needed.   07/23/24 -- 30 Day Eval -- Pt questioned re: new stress, anxiety or depressive symptoms; pt denies any new stress; pt is aware of stress reduction techniques and uses them as needed. Pt attended Stress/Anxiety/Depression education session  Orientation: Pt denies stress, no hx of depression or anxiety, uses stress management techniques as needed.    PHQ-2 score (0)    Pt reported/currently experiencing stress: No  Patient uses stress management skills: Yes   History of: no history of anxiety or depression  Currently seeing a mental health provider: No  Social Support: Yes, Whom:Spouse  Quality of Life Survey:  PHQ-2  Learning Assessment:  Learning assessment/barriers: None  Preferred learning method: Reading handout  Barriers: None  Pt has some decreased vision and hearing loss    Stages of Change:Action    Psychosocial Plan    Goal Status: Met    Psychosocial Goals: Demonstrating proper techniques for stress  management and Identify social supports    Psychosocial Interventions/Education:   Encouraged attendance for stress management lecture.  Instructed patient on 3 minute breathing space  Pt attended Stress/Anxiety/Depression education session with handout    Education sessions attended/Handouts given:  Exercise  Blood Pressure  Stress/Anxiety/Depression  Heart Parts/Angina  Risk Factors  Metabolic Syndrome/Diabetes  Heart Failure  Healthy Eating  Blood Pressure  Pt has attended all offered education sessions and received the handouts.    Initial Knowledge test returned, scored 4/15 because he did not see back of page. Reviewed w/ pt 7/1  Nutrition Assessment:  10/07/2024 - Graduation Eval -- Pt is eating well with Block Fat Screener showing Low fat intake which is improved from his initial which showed high fat intake.  His weight is down 3.8# since starting Cardiac Rehab. No new lipid labs available for review.   09/17/2024 - 90 Day Eval -- Pt is eating well and his weight is down 3.2# since he stared Cardiac Rehab. No new lipid labs drawn. Pt has attended Risk Factors, Metabolic Syndrome/Diabetes, Healthy Eating and Cholesterol education sessions with handouts.  08/20/24 - 60 Day Eval -- Pt is eating better and his weight is down 1.8# since starting rehab. No new lipid labs drawn.   07/23/24 -- 30 Day Eval -- Pt is attempting to eat more heart healthy. His weight is down 2#. No new lipid labs are available. No hypoglycemic episodes.  Hyperlipidemia: Yes     Lipids:   Lab Results   Component Value Date    CHOL 127 06/08/2024    HDL 26.7 06/08/2024    LDLF 80 03/22/2022    TRIG 260 (H) 06/08/2024       Current Dietary Guidelines: Low fat, ADA  Barriers to dietary change: no    Diet Habit Survey: Block Fat Screener  Pre:  Initial Fat Screener returned 7/1, scored High (>35%). Reviewed w/ pt. AHA Healthy Fat handout given  Post: Post Rehab Fat Screener tool shows Low Fat intake (<30%), improved from initial.  "    Diabetes Assessment    Lab Results   Component Value Date    HGBA1C 6.8 (A) 09/12/2024       History of Diabetes: Yes Pt monitors BS at home: Yes   Frequency: 1 /day  Hypoglycemic Episodes: No      Nutrition Plan    Goal Status: In progress - pt will continue to work on his blood sugar.    Nutrition Goals: Lipid Goal: HDL>45, LDL <70, Total <180, Trigs <150, Learn how to read and interpret nutrition labels prior to discharge, Maintain body weight, and Check blood glucose daily    Nutrition Interventions/Education:   Booklet given  \"Heart Attack - Bouncing Back\"   Encourage patient to follow a heart healthy diet  Lipid profile reviewed    Exercise Assessment  10/07/2024 - Graduation Eval -- Pt is exercising for 40 min at 4.2 to 4.9 METs without cardiac symptoms. He has advanced well. His 6 MWT today showed a 9% improvement in his walk.   09/17/2024 - 90 Day Eval -- Pt is exercising for 40 min at 4.0 to 4.8 METs and tolerating it well.   08/20/24 - 60 Day Eval -- Pt is exercising 40 min at 3.2 to 4.8 METs without issues. No cardiac complaints.   07/23/24 -- 30 Day Eval -- Pt is exercising for 40 min at 2.7 to 2.9 METs and progressing slowly without cardiac symptoms. He attended the Exercise education session.    Home Exercise?: Yes  Mode: Working out at gym, 9SLIDES, walking  Frequency: Twice per week  Duration: 40-50 min    Exercise Prescription     Exercise Prescription based on: 6 Minute Walk Test          Frequency:  3 days/week   Mode: Treadmill, Recumbent Cycle, Arm Ergometer, and Stepper   Duration: 40 total aerobic minutes   Intensity: RPE 11-13  Target HR:     MET Level: 3-6  Patient wears supplemental O2: No     Modality Workload METs Duration (minutes)   1 Pre-Exercise      2 Arm Ergometer 3.5  4.5 10 :00   3 Recumbent Bike 12 4.5 10 :00   4 Stepper 5.0 4.9 10 :00   5 Treadmill 3.0/2.0  4.2 10 :00   6 Post-Exercise        Resistance Training: Yes  08/07/24  Home Exercise Prescription given: " Yes    Exercise Plan    Goal Status: Met    Exercise Goals: Increase exercise MET level by 5-10% each week, Increase total exercise duration to 30-45 minutes, Obtain 150 minutes/week of moderate intensity aerobic exercise, and Establish a home exercise program before discharge    Exercise Interventions/Education:   Exercise prescription based on 6 MWT  Exercise changes made based on HR & RPE scale in response to exercise  Increase duration by 1-2 minutes per modality for a total of 40 minutes per session  Target goal for duration 40 minutes per session, increase MET levels by 5-10% every 30 days or as tolerated  Encouraged home exercise on days not in rehab  Pt attended Exercise education session and received the handout      Other Core Components/Risk Factor Assessment:  10/07/2024 - Graduation Eval -- Post Rehab knowledge test scored 14/15. Reviewed w/ pt. Pt is taking his meds and his BP is at goal.   09/17/2024 - 90 Day Eval -- Pt is Taking his meds as prescribed, using a pill box and carrying a medication list. His BP is at goal at this time.   08/20/24 - 60 Day Eval -- Pt is Taking his meds as prescribed, using a pill box and carrying a medication list. His BP is at goal at this time.   07/23/24 -- 30 Day Eval -- Pt is taking meds as prescribed and BP is at goal. Pt attended Blood Pressure, Heart Failure, Angina education sessions and received the handouts. No recent medication changes.  Medication adherence  Current Medications:   Medication Documentation Review Audit       Reviewed by Bailey Patino MA (Medical Assistant) on 09/12/24 at 1302      Medication Order Taking? Sig Documenting Provider Last Dose Status   acetaminophen (Tylenol) 500 mg tablet [id number]  2 tab(s) orally as needed Historical Provider, MD  Active   aspirin 81 mg EC tablet [id number] Yes Take 1 tablet (81 mg) by mouth once daily. Basim French MD Taking Active   atorvastatin (Lipitor) 80 mg tablet [id number] Yes Take 1 tablet (80 mg) by  mouth once daily. Basim French MD Taking Active   blood sugar diagnostic (TRUETEST TEST STRIPS MISC) [id number]   Historical Provider, MD  Active   dapagliflozin propanediol (Farxiga) 10 mg [id number] Yes Take 1 tablet (10 mg) by mouth once daily. Laura L Seaver, APRN-CNP Taking Active   finasteride (Proscar) 5 mg tablet [id number] Yes Take 1 tablet (5 mg) by mouth once daily. Do not crush, chew, or split. Osman Coulter MD Taking Active   FreeStyle Lancets 28 gauge [id number]  by Does not apply route 2 times a day. Historical Provider, MD  Active   lancets (Micro Thin Lancets) 33 gauge misc [id number]  1 Lancet by Does not apply route 2 times a day. Osman Coulter MD  Active   levothyroxine (Synthroid, Levoxyl) 100 mcg tablet [id number] Yes Take 1 tablet (100 mcg) by mouth early in the morning.. Take on an empty stomach at the same time each day, either 30 to 60 minutes prior to breakfast Historical Provider, MD Taking Active     Discontinued 09/12/24 1302   metoprolol tartrate (Lopressor) 25 mg tablet [id number] Yes Take 1 tablet (25 mg) by mouth 2 times a day. Ludwin Burns MD Taking Active   omeprazole (PriLOSEC) 20 mg tablet,delayed release (DR/EC) EC tablet [id number] Yes Take 1 tablet (20 mg) by mouth once daily. Osman Coulter MD Taking Active   tamsulosin (Flomax) 0.4 mg 24 hr capsule [id number] Yes Take 1 capsule (0.4 mg) by mouth once daily. Osman Coulter MD Taking Active   ticagrelor (Brilinta) 90 mg tablet [id number] Yes Take 1 tablet (90 mg) by mouth 2 times a day. Basim French MD Taking Active   triamterene-hydrochlorothiazid (Dyazide) 37.5-25 mg capsule [id number] Yes Take 1 capsule by mouth once daily. Osman Coulter MD Taking Active   vit C,G-Zz-hoxnb-lutein-zeaxan (PreserVision AREDS-2) 250-90-40-1 mg capsule [id number]  Take 1 capsule by mouth 2 times a day. Historical Provider, MD  Active                                 Medication compliance: Yes   Uses pill box/organizer: Yes    Carries  medication list: Yes     Blood Pressure Management  History of Hypertension: Yes   Medication Changes: No   Resting BP:  131/67       Heart Failure Management  Hx of Heart Failure: No    Smoking/Tobacco Assessment  Social History     Tobacco Use   Smoking Status Former    Types: Cigarettes   Smokeless Tobacco Never       Other Core Component Plan    Goal Status: Met    Other Core Component Goals: Medication compliance, Verbalize medication usage and drug actions by discharge, and Achieve resting BP of < 130/80 by discharge    Other Core Component Interventions/Education:   Pt is taking all meds as prescribed and carries an updated medication list  Blood pressure remains within target goal  Check resting BP pre and post exercise  Encouraged to carry an updated medication list  Reviewed effects of exercise on Blood Pressure  Blood Pressure education session attended, H/O received  Individual Patient Goals:    Pt would like to exercise to prevent further cardiac events  Pt would like to decrease his snacking    Goal Status: In progress - Pt plans to continue exercise at [facility] and his diet has improved since starting rehab.    Staff Comments:  Pt plans to attend 36 sessions of Cardiac Rehab in order to achieve his goals.   Orientation: BP R arm sitting 131/69, L arm sitting 143/77, standing 133/68, PO2 95%, HR 52 bpm. Lungs CTA posteriorly, Heart sounds S1S2 RRR - slow rate. Radial pulses 3+ bilat, no peripheral edema noted.   6 WMT: 1340 ft = 2.5 mph = 2.9 METs. RPE 11. /74, PO2 98%, HR 70 bpm. Pt walked well without ERAZO or CP, no cardiac symptoms or significant physical limitations noted.   Cool Down: /67, PO2 97%, HR 53 bpm.   07/23/24 -- 30 Day Eval -- Pt is trying to eat better and his weight is down 2#. No new lipid labs have been obtained. Pt is taking his medications as prescribed and his BP is at goal. No recent medication changes. Pt denies new stress and is aware of the use of  stress management techniques. Pt is exercising for 40 min at 2.7 to 2.9 METs without cardiac complaints. Pt has had no falls and no hospitalization for cardiac issues.   08/20/24 - 60 Day Eval -- Pt is working on a more heart healthy diet and his weight is down 1.8#. No new lipid labs available. Pt is taking his medications as prescribed and his BP is at goal. Pt denies new stress/anxiety/depression and is aware of the use of stress management techniques. He is exercising for 40 min at 3.2 to 4.8 METs and tolerating rehab exercise well. He denies falling, hospitalization for cardiac symptoms or medication changes.   09/17/2024 - 90 Day Eval -- Pt continues to strive for a heart healthy diet and has lost 3.2#. No new lipid labs are available. He is taking his meds as prescribed and his BP is at goal. Pt denies new stress and is aware of stress management techniques and their use. He is exercising 40 min per session now up to 4.0 to 4.8 METs without cardiac complaints. He denies falls, med changes, hospitalization for cardiac issues.   10/07/2024 - Graduation Eval -- Pt has done well in Cardiac Rehab with decreased dietary fat intake, weight loss, increased exercise tolerance and ability as evidenced by 9% improvement in his 6 MWT today. He is taking his meds as prescribed and BP is at goal. He denies stress/anxiety/depression and is aware of stress management techniques and their use. He has had no falls, med changes, hospitalization for cardiac issues or cardiac symptoms during rehab. 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 We wish him well.   Rehab Staff Signature: Yaakov Lackey RN

## 2024-10-09 ENCOUNTER — APPOINTMENT (OUTPATIENT)
Dept: CARDIAC REHAB | Facility: HOSPITAL | Age: 74
End: 2024-10-09
Payer: MEDICARE

## 2024-10-10 ENCOUNTER — APPOINTMENT (OUTPATIENT)
Dept: PRIMARY CARE | Facility: CLINIC | Age: 74
End: 2024-10-10
Payer: COMMERCIAL

## 2024-10-11 ENCOUNTER — APPOINTMENT (OUTPATIENT)
Dept: CARDIAC REHAB | Facility: HOSPITAL | Age: 74
End: 2024-10-11
Payer: MEDICARE

## 2024-10-14 ENCOUNTER — APPOINTMENT (OUTPATIENT)
Dept: CARDIAC REHAB | Facility: HOSPITAL | Age: 74
End: 2024-10-14
Payer: MEDICARE

## 2024-10-14 NOTE — PROGRESS NOTES
"  Clinical Pharmacist Visit    Patient is sent at the request of Seaver, Laura L, APRN-C* for my opinion regarding Type 2 diabetes and cost assitance.  My final recommendations will be communicated back to the requesting provider by way of shared medical record.    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    Past Surgical History:  He has a past surgical history that includes Other surgical history (09/25/2020); Other surgical history (06/03/2022); Other surgical history (06/03/2022); Other surgical history (06/03/2022); Cardiac catheterization (N/A, 6/7/2024); and Cardiac catheterization (N/A, 6/7/2024).    Social History:  He reports that he has quit smoking. His smoking use included cigarettes. He has never used smokeless tobacco. He reports that he does not currently use alcohol. He reports that he does not use drugs.    Family History:  Family History   Problem Relation Name Age of Onset    Heart disease Mother      Prostatitis Father      Other (Cardiac Disorder) Other family hx     Diabetes Other family hx     Other (Malignant Neoplasm of Prostate) Other family hx     Hypertension Other family hx         HTN       Allergies:  Ace inhibitors, Valsartan, and Lisinopril    Current diet: in general, a \"healthy\" diet    Current exercise: walking    Patient is using: glucometer  The patient is not currently checking the blood glucose    Hypoglycemia frequency: none  Hypoglycemia awareness: Yes     Adverse Effects:   none     Objective     Last Recorded Vitals:  BP Readings from Last 6 Encounters:   10/15/24 112/62   09/12/24 113/66   06/25/24 138/74   06/11/24 134/78   06/11/24 126/70   06/08/24 136/79        Wt Readings from Last 6 Encounters: " "  10/15/24 73 kg (161 lb)   09/12/24 73 kg (161 lb)   06/25/24 74.3 kg (163 lb 12.8 oz)   06/11/24 74.8 kg (165 lb)   06/11/24 74.8 kg (165 lb)   06/07/24 74.4 kg (164 lb)       Diabetes Pharmacotherapy:  Current medications:  - Farxiga 10 mg    Previous medications:     Primary/Secondary Prevention   - Statin? Yes  - ACE-I/ARB? No  - Aspirin? Yes    Pertinent PMH Review:  - PMH of Pancreatitis: No  - PMH of Retinopathy: No  - PMH of Urinary Tract Infections: No  - PMH of MTC: No    Lab Review  Lab Results   Component Value Date    BILITOT 0.6 06/06/2024    CALCIUM 8.3 (L) 06/08/2024    CO2 23 06/08/2024     06/08/2024    CREATININE 1.50 (H) 06/08/2024    GLUCOSE 117 (H) 06/08/2024    ALKPHOS 47 06/06/2024    K 4.1 06/08/2024    PROT 7.2 06/06/2024     06/08/2024    AST 26 06/06/2024    ALT 34 06/06/2024    BUN 25 (H) 06/08/2024    ANIONGAP 11 06/08/2024    MG 1.91 06/06/2024    PHOS 2.8 06/08/2024    ALBUMIN 3.6 06/08/2024    GFRMALE 47 (A) 04/28/2023     Lab Results   Component Value Date    TRIG 260 (H) 06/08/2024    CHOL 127 06/08/2024    LDLCALC 48 06/08/2024    HDL 26.7 06/08/2024     Lab Results   Component Value Date    HGBA1C 6.8 (A) 09/12/2024    HGBA1C 7.2 (A) 06/11/2024    HGBA1C 7.2 (A) 03/08/2024     No components found for: \"UACR\"  The ASCVD Risk score (Darryn CHAPIN, et al., 2019) failed to calculate for the following reasons:    Risk score cannot be calculated because patient has a medical history suggesting prior/existing ASCVD    Health Maintenance:   Foot Exam: follows with podiatry  Eye Exam: yearly  Lipid Panel: LDL WNL 6/8/24  Urine Albumin: WNL 1/18/21, recommend to update, get every 2 years per guidelines   Influenza Immunization: yearly  Pneumonia Immunization: UTD, [id number] recommended in 2026  Renal function: GFR 48 on 6/8/24    Drug Interactions:  None requiring intervention     Patient Assistance Screening (VAF)  Patient verbally reports monthly or yearly income which is less " than 400% federal poverty level  Application for program has been submitted for the following medications:   Farxiga, Brilinta   Patient aware this process may take up to 2 weeks once income documents have been sent to the team.  If approved, medication must be filled through [facility] pharmacy and may be picked up or mailed to patient.   If approved, medication will be billed through insurance, and patient assistance team will pay the copay. This will result in a $0 copay for the patient.  Counseled patient on mechanism of action, side effects, contraindications, and what to do if the patient misses a dose. All patients questions were answered.      Assessment/Plan   Problem List Items Addressed This Visit       Type 2 diabetes mellitus    Relevant Orders    Referral to Clinical Pharmacy     Patient is not eligible for patient assistance this year due to higher income reported due to the sale of a piece of property. Will revisit the application for patient assistance early next year since that will no longer be reported as income. Patient is tolerating all medications and has not issues.     Patients diabetes is well controlled with most recent A1c of 6.8% on 9/12/24 (Goal < 7%).   Continue: all medications as prescribed  Compliance at present is estimated to be excellent.  Education Provided to Patient: patient assistance   Labs ordered: none, UTD  Follow-up: I recommend diabetes care be 3 months.  PCP Follow-Up: 12/11/24    Jacque Abebe, PharmD  Clinical Pharmacy Specialist    Continue all meds under the continuation of care with the referring provider and clinical pharmacy team.

## 2024-10-15 ENCOUNTER — APPOINTMENT (OUTPATIENT)
Dept: PRIMARY CARE | Facility: CLINIC | Age: 74
End: 2024-10-15
Payer: COMMERCIAL

## 2024-10-15 VITALS
HEART RATE: 51 BPM | OXYGEN SATURATION: 97 % | HEIGHT: 71 IN | DIASTOLIC BLOOD PRESSURE: 62 MMHG | RESPIRATION RATE: 16 BRPM | SYSTOLIC BLOOD PRESSURE: 112 MMHG | WEIGHT: 161 LBS | BODY MASS INDEX: 22.54 KG/M2 | TEMPERATURE: 97.5 F

## 2024-10-15 DIAGNOSIS — R73.9 HYPERGLYCEMIA: ICD-10-CM

## 2024-10-15 DIAGNOSIS — N40.1 BENIGN PROSTATIC HYPERPLASIA WITH LOWER URINARY TRACT SYMPTOMS, SYMPTOM DETAILS UNSPECIFIED: ICD-10-CM

## 2024-10-15 DIAGNOSIS — R11.2 NAUSEA AND VOMITING, UNSPECIFIED VOMITING TYPE: ICD-10-CM

## 2024-10-15 DIAGNOSIS — I10 ESSENTIAL (PRIMARY) HYPERTENSION: ICD-10-CM

## 2024-10-15 DIAGNOSIS — D50.8 OTHER IRON DEFICIENCY ANEMIA: ICD-10-CM

## 2024-10-15 DIAGNOSIS — E78.2 MIXED HYPERLIPIDEMIA: ICD-10-CM

## 2024-10-15 DIAGNOSIS — Z00.00 ANNUAL PHYSICAL EXAM: ICD-10-CM

## 2024-10-15 DIAGNOSIS — K21.9 GASTROESOPHAGEAL REFLUX DISEASE WITHOUT ESOPHAGITIS: Primary | ICD-10-CM

## 2024-10-15 DIAGNOSIS — Z12.5 PROSTATE CANCER SCREENING: ICD-10-CM

## 2024-10-15 DIAGNOSIS — E03.9 HYPOTHYROIDISM, UNSPECIFIED TYPE: ICD-10-CM

## 2024-10-15 DIAGNOSIS — R11.0 NAUSEA: ICD-10-CM

## 2024-10-15 DIAGNOSIS — E11.21 TYPE 2 DIABETES MELLITUS WITH DIABETIC NEPHROPATHY, WITHOUT LONG-TERM CURRENT USE OF INSULIN: ICD-10-CM

## 2024-10-15 DIAGNOSIS — R10.84 GENERALIZED ABDOMINAL PAIN: ICD-10-CM

## 2024-10-15 DIAGNOSIS — E55.9 VITAMIN D DEFICIENCY: ICD-10-CM

## 2024-10-15 DIAGNOSIS — I10 PRIMARY HYPERTENSION: ICD-10-CM

## 2024-10-15 DIAGNOSIS — Z13.29 SCREENING FOR THYROID DISORDER: ICD-10-CM

## 2024-10-15 DIAGNOSIS — Z00.00 ENCOUNTER FOR MEDICARE ANNUAL WELLNESS EXAM: ICD-10-CM

## 2024-10-15 PROCEDURE — 1170F FXNL STATUS ASSESSED: CPT | Performed by: INTERNAL MEDICINE

## 2024-10-15 PROCEDURE — G0444 DEPRESSION SCREEN ANNUAL: HCPCS | Performed by: INTERNAL MEDICINE

## 2024-10-15 PROCEDURE — 1123F ACP DISCUSS/DSCN MKR DOCD: CPT | Performed by: INTERNAL MEDICINE

## 2024-10-15 PROCEDURE — G0439 PPPS, SUBSEQ VISIT: HCPCS | Performed by: INTERNAL MEDICINE

## 2024-10-15 PROCEDURE — 1036F TOBACCO NON-USER: CPT | Performed by: INTERNAL MEDICINE

## 2024-10-15 PROCEDURE — G2211 COMPLEX E/M VISIT ADD ON: HCPCS | Performed by: INTERNAL MEDICINE

## 2024-10-15 PROCEDURE — 1159F MED LIST DOCD IN RCRD: CPT | Performed by: INTERNAL MEDICINE

## 2024-10-15 PROCEDURE — 3078F DIAST BP <80 MM HG: CPT | Performed by: INTERNAL MEDICINE

## 2024-10-15 PROCEDURE — 3074F SYST BP LT 130 MM HG: CPT | Performed by: INTERNAL MEDICINE

## 2024-10-15 PROCEDURE — 1158F ADVNC CARE PLAN TLK DOCD: CPT | Performed by: INTERNAL MEDICINE

## 2024-10-15 PROCEDURE — 99214 OFFICE O/P EST MOD 30 MIN: CPT | Performed by: INTERNAL MEDICINE

## 2024-10-15 PROCEDURE — 99397 PER PM REEVAL EST PAT 65+ YR: CPT | Performed by: INTERNAL MEDICINE

## 2024-10-15 ASSESSMENT — ACTIVITIES OF DAILY LIVING (ADL)
MANAGING_FINANCES: INDEPENDENT
EATING: INDEPENDENT
TAKING_MEDICATION: INDEPENDENT
PREPARING MEALS: INDEPENDENT
DRESSING: INDEPENDENT
JUDGMENT_ADEQUATE_SAFELY_COMPLETE_DAILY_ACTIVITIES: YES
USING TRANSPORTATION: INDEPENDENT
TOILETING: INDEPENDENT
ADEQUATE_TO_COMPLETE_ADL: YES
BATHING: INDEPENDENT
PATIENT'S MEMORY ADEQUATE TO SAFELY COMPLETE DAILY ACTIVITIES?: YES
GROOMING: INDEPENDENT
STIL DRIVING: YES
GROCERY_SHOPPING: INDEPENDENT
FEEDING YOURSELF: INDEPENDENT
NEEDS ASSISTANCE WITH FOOD: INDEPENDENT
DOING_HOUSEWORK: INDEPENDENT

## 2024-10-15 ASSESSMENT — PATIENT HEALTH QUESTIONNAIRE - PHQ9
1. LITTLE INTEREST OR PLEASURE IN DOING THINGS: NOT AT ALL
SUM OF ALL RESPONSES TO PHQ9 QUESTIONS 1 AND 2: 0
2. FEELING DOWN, DEPRESSED OR HOPELESS: NOT AT ALL

## 2024-10-15 NOTE — PROGRESS NOTES
Primary Care Physician: Osman Coulter MD    Date of Visit: 10/15/2024    Chief Complaint:     Chief Complaint   Patient presents with    Medicare Annual Wellness Visit Subsequent   Acp: wife, Viridiana    Subjective:  Patient Alfonzo Baldwin is a 74 y.o. male  that presents for Medicare Wellness    HPI:  HPI   Dm2  Htn  Hchol  Cad s/p mi   Bph  Elevated psa  Hyperparathyroidism  ckd  Past Medical History:  Past Medical History:   Diagnosis Date    Personal history of other diseases of the circulatory system     History of hypertension        Surgical History:  Past Surgical History:   Procedure Laterality Date    CARDIAC CATHETERIZATION N/A 6/7/2024    Procedure: Left Heart Cath, With LV;  Surgeon: Ludwin Burns MD;  Location: [facility];  Service: Cardiovascular;  Laterality: N/A;    CARDIAC CATHETERIZATION N/A 6/7/2024    Procedure: PCI JOY Stent- Coronary;  Surgeon: Lduwin Burns MD;  Location: [facility];  Service: Cardiovascular;  Laterality: N/A;    OTHER SURGICAL HISTORY  09/25/2020    Hernia repair    OTHER SURGICAL HISTORY  06/03/2022    Thyroidectomy    OTHER SURGICAL HISTORY  06/03/2022    Parathyroidectomy    OTHER SURGICAL HISTORY  06/03/2022    Colonoscopy        Immunizations:   Immunization History   Administered Date(s) Administered    Flu vaccine, quadrivalent, high-dose, preservative free, age 65y+ (FLUZONE) 10/15/2018, 10/04/2019, 10/06/2020    Flu vaccine, trivalent, preservative free, HIGH-DOSE, age 65y+ (Fluzone) 10/07/2020, 10/21/2020, 09/06/2021    Hepatitis B vaccine, adult *Check Product/Dose* 04/28/2021, 06/29/2021, 10/28/2021    Influenza, Seasonal, Quadrivalent, Adjuvanted 09/27/2021, 09/29/2022, 10/19/2022, 10/04/2023    Influenza, Unspecified 10/17/2019, 09/01/2023    Pfizer COVID-19 vaccine, 12 years and older, (30mcg/0.3mL) (Comirnaty) 11/01/2023, 09/23/2024    Pfizer COVID-19 vaccine, bivalent, age 12 years and older (30 mcg/0.3 mL) 09/29/2022    Pfizer Gray  Cap SARS-CoV-2 05/25/2022    Pfizer Purple Cap SARS-CoV-2 02/23/2021, 03/16/2021, 11/18/2021    Pneumococcal conjugate vaccine, 13-valent (PREVNAR 13) 04/28/2021    Pneumococcal polysaccharide vaccine, 23-valent, age 2 years and older (PNEUMOVAX 23) 10/04/2019, 10/04/2021    RESPIRATORY SYNCYTIAL VIRUS (RSV), ELIGIBLE PREGNANT PTS, 0.5 ML (ABRYSVO) 11/01/2023    Tdap vaccine, age 7 year and older (BOOSTRIX, ADACEL) 04/21/2021    Zoster vaccine, recombinant, adult (SHINGRIX) 01/21/2021, 04/21/2021        Family History:  Family History   Problem Relation Name Age of Onset    Heart disease Mother      Prostatitis Father      Other (Cardiac Disorder) Other family hx     Diabetes Other family hx     Other (Malignant Neoplasm of Prostate) Other family hx     Hypertension Other family hx         HTN        Social History:  Social History     Socioeconomic History    Marital status:      Spouse name: Not on file    Number of children: Not on file    Years of education: Not on file    Highest education level: Not on file   Occupational History    Not on file   Tobacco Use    Smoking status: Former     Types: Cigarettes    Smokeless tobacco: Never   Vaping Use    Vaping status: Never Used   Substance and Sexual Activity    Alcohol use: Not Currently    Drug use: Never    Sexual activity: Defer   Other Topics Concern    Not on file   Social History Narrative    Not on file     Social Determinants of Health     Financial Resource Strain: Low Risk  (6/6/2024)    Overall Financial Resource Strain (CARDIA)     Difficulty of Paying Living Expenses: Not hard at all   Food Insecurity: Not on file   Transportation Needs: No Transportation Needs (6/6/2024)    PRAPARE - Transportation     Lack of Transportation (Medical): No     Lack of Transportation (Non-Medical): No   Physical Activity: Insufficiently Active (6/25/2024)    Exercise Vital Sign     Days of Exercise per Week: 2 days     Minutes of Exercise per Session: 50 min    Stress: No Stress Concern Present (6/25/2024)    [employer] - Occupational Stress Questionnaire     Feeling of Stress : Not at all   Social Connections: Not on file   Intimate Partner Violence: Not on file   Housing Stability: Low Risk  (6/6/2024)    Housing Stability Vital Sign     Unable to Pay for Housing in the Last Year: No     Number of Places Lived in the Last Year: 1     Unstable Housing in the Last Year: No        Medications:  Current Outpatient Medications   Medication Instructions    acetaminophen (Tylenol) 500 mg tablet 2 tab(s) orally as needed    aspirin 81 mg, oral, Daily    atorvastatin (LIPITOR) 80 mg, oral, Daily    blood sugar diagnostic (TRUETEST TEST STRIPS MISC)     dapagliflozin propanediol (FARXIGA) 10 mg, oral, Daily    finasteride (PROSCAR) 5 mg, oral, Daily, Do not crush, chew, or split.    FreeStyle Lancets 28 gauge Does not apply, 2 times daily RT    lancets (Micro Thin Lancets) 33 gauge misc 1 Lancet, Does not apply, 2 times daily    levothyroxine (SYNTHROID, LEVOXYL) 100 mcg, oral, Daily, Take on an empty stomach at the same time each day, either 30 to 60 minutes prior to breakfast    metoprolol tartrate (LOPRESSOR) 25 mg, oral, 2 times daily    omeprazole (PRILOSEC) 20 mg, oral, Daily    tamsulosin (FLOMAX) 0.4 mg, oral, Daily    ticagrelor (Brilinta) 90 mg tablet Take 1 tablet (90 mg) by mouth 2 times a day.    triamterene-hydrochlorothiazid (Dyazide) 37.5-25 mg capsule 1 capsule, oral, Daily    vit C,Z-Cr-mtpew-lutein-zeaxan (PreserVision AREDS-2) 250-90-40-1 mg capsule 1 capsule, oral, 2 times daily        Allergies:  Allergies   Allergen Reactions    Ace Inhibitors Cough     cough    Valsartan Cough     cough    Lisinopril Cough        ROS:  Review of Systems   Constitutional:  Negative for activity change, chills, fatigue, fever and unexpected weight change.   HENT:  Negative for congestion, dental problem, ear pain, sinus pressure, sinus pain,  "sore throat and trouble swallowing.    Eyes:  Negative for photophobia, discharge, redness and visual disturbance.   Respiratory:  Negative for cough, chest tightness, shortness of breath and wheezing.    Cardiovascular:  Negative for chest pain, palpitations and leg swelling.   Gastrointestinal:  Negative for abdominal pain, blood in stool, constipation, diarrhea, nausea and vomiting.   Endocrine: Negative for cold intolerance, heat intolerance, polydipsia, polyphagia and polyuria.   Genitourinary:  Negative for difficulty urinating, dysuria, flank pain, frequency and urgency.   Musculoskeletal:  Negative for arthralgias, joint swelling and myalgias.   Skin:  Negative for color change and rash.   Allergic/Immunologic: Negative for environmental allergies, food allergies and immunocompromised state.   Neurological:  Negative for dizziness, weakness, light-headedness, numbness and headaches.   Hematological:  Does not bruise/bleed easily.   Psychiatric/Behavioral:  Negative for dysphoric mood and sleep disturbance. The patient is not nervous/anxious.         No anxiety.  No depression        Vitals:  /62 (BP Location: Right arm, Patient Position: Sitting, BP Cuff Size: Adult)   Pulse 51   Temp 36.4 °C (97.5 °F) (Temporal)   Resp 16   Ht 1.803 m (5' 11\")   Wt 73 kg (161 lb)   SpO2 97%   BMI 22.45 kg/m²   Wt Readings from Last 2 Encounters:   10/15/24 73 kg (161 lb)   09/12/24 73 kg (161 lb)       Physical Exam:  Physical Exam  Constitutional:       General: He is not in acute distress.     Appearance: Normal appearance. He is well-developed and well-groomed.   HENT:      Head: Normocephalic and atraumatic.      Right Ear: Tympanic membrane and ear canal normal.      Left Ear: Tympanic membrane and ear canal normal.      Nose: Nose normal.      Mouth/Throat:      Mouth: Mucous membranes are moist.      Pharynx: Oropharynx is clear.   Eyes:      Conjunctiva/sclera: Conjunctivae normal.      Pupils: Pupils " are equal, round, and reactive to light.   Neck:      Thyroid: No thyromegaly.   Cardiovascular:      Rate and Rhythm: Normal rate and regular rhythm.      Heart sounds: Normal heart sounds, S1 normal and S2 normal. No murmur heard.  Pulmonary:      Effort: Pulmonary effort is normal.      Breath sounds: Normal breath sounds and air entry. No wheezing, rhonchi or rales.   Abdominal:      General: Bowel sounds are normal. There is no distension.      Palpations: Abdomen is soft.      Tenderness: There is no abdominal tenderness. There is no guarding or rebound.   Musculoskeletal:         General: No swelling or tenderness. Normal range of motion.      Cervical back: Normal, full passive range of motion without pain, normal range of motion and neck supple.      Thoracic back: Normal.      Lumbar back: Normal.      Right lower leg: No edema.      Left lower leg: No edema.      Comments: Upper and lower extrems are wnl--inspection and palpation  Strength is normal and symmetric   Lymphadenopathy:      Cervical: No cervical adenopathy.   Skin:     General: Skin is warm and dry.      Findings: No bruising, erythema, lesion or rash.      Comments: Intact.   Neurological:      General: No focal deficit present.      Mental Status: He is alert and oriented to person, place, and time.      Sensory: Sensation is intact.      Motor: Motor function is intact.      Deep Tendon Reflexes: Reflexes are normal and symmetric.   Psychiatric:         Mood and Affect: Mood and affect normal.         Speech: Speech normal.         Behavior: Behavior normal. Behavior is cooperative.           Orders:  Orders Placed This Encounter   Procedures    CBC and Auto Differential    Comprehensive Metabolic Panel    Lipid Panel    Urinalysis with Reflex Microscopic    Vitamin D 25-Hydroxy,Total (for eval of Vitamin D levels)    TSH with reflex to Free T4 if abnormal    Hemoglobin A1C    Prostate Specific Antigen    Referral to Gastroenterology          Future Appointments:  Future Appointments   Date Time Provider Department Center   10/16/2024  9:00 AM Jacque Abebe, PharmD [id number] [facility]   12/3/2024 11:00 AM Ludwin Burns MD [id number] [facility]   12/11/2024  9:40 AM Laura L Seaver, APRN-CNP [id number] [facility]             Assessment/Plan:  Diagnoses and all orders for this visit:  Gastroesophageal reflux disease without esophagitis  -     Referral to Gastroenterology; Future  -     omeprazole (PriLOSEC) 20 mg tablet,delayed release (DR/EC) EC tablet; Take 1 tablet (20 mg) by mouth once daily.  Generalized abdominal pain  -     Referral to Gastroenterology; Future  Nausea and vomiting, unspecified vomiting type  -     Referral to Gastroenterology; Future  Nausea  -     Referral to Gastroenterology; Future  Other iron deficiency anemia  -     CBC and Auto Differential; Future  Annual physical exam  -     CBC and Auto Differential; Future  -     Comprehensive Metabolic Panel; Future  -     Lipid Panel; Future  -     Urinalysis with Reflex Microscopic; Future  -     Vitamin D 25-Hydroxy,Total (for eval of Vitamin D levels); Future  -     TSH with reflex to Free T4 if abnormal; Future  -     Hemoglobin A1C; Future  -     Prostate Specific Antigen; Future  Encounter for Medicare annual wellness exam  -     CBC and Auto Differential; Future  -     Comprehensive Metabolic Panel; Future  -     Lipid Panel; Future  -     Urinalysis with Reflex Microscopic; Future  -     Vitamin D 25-Hydroxy,Total (for eval of Vitamin D levels); Future  -     TSH with reflex to Free T4 if abnormal; Future  -     Hemoglobin A1C; Future  -     Prostate Specific Antigen; Future  Mixed hyperlipidemia  -     Lipid Panel; Future  Vitamin D deficiency  -     Vitamin D 25-Hydroxy,Total (for eval of Vitamin D levels); Future  Screening for thyroid disorder  -     TSH with reflex to Free T4 if abnormal; Future  Hyperglycemia  -     Hemoglobin A1C; Future  Prostate cancer screening  -      Prostate Specific Antigen; Future  Benign prostatic hyperplasia with lower urinary tract symptoms, symptom details unspecified  -     finasteride (Proscar) 5 mg tablet; Take 1 tablet (5 mg) by mouth once daily. Do not crush, chew, or split.  -     tamsulosin (Flomax) 0.4 mg 24 hr capsule; Take 1 capsule (0.4 mg) by mouth once daily.  Essential (primary) hypertension  -     triamterene-hydrochlorothiazid (Dyazide) 37.5-25 mg capsule; Take 1 capsule by mouth once daily.  -     metoprolol tartrate (Lopressor) 25 mg tablet; Take 1 tablet (25 mg) by mouth 2 times a day.  Primary hypertension  Type 2 diabetes mellitus with diabetic nephropathy, without long-term current use of insulin  -     dapagliflozin propanediol (Farxiga) 10 mg; Take 1 tablet (10 mg) by mouth once daily.  -     lancets (Micro Thin Lancets) 33 gauge misc; 1 Lancet by Does not apply route 2 times a day.  Hypothyroidism, unspecified type  -     levothyroxine (Synthroid, Levoxyl) 100 mcg tablet; Take 1 tablet (100 mcg) by mouth early in the morning.. Take on an empty stomach at the same time each day, either 30 to 60 minutes prior to breakfast        Osman Coulter MD

## 2024-10-16 ENCOUNTER — APPOINTMENT (OUTPATIENT)
Dept: PHARMACY | Facility: HOSPITAL | Age: 74
End: 2024-10-16
Payer: MEDICARE

## 2024-10-16 ENCOUNTER — APPOINTMENT (OUTPATIENT)
Dept: CARDIAC REHAB | Facility: HOSPITAL | Age: 74
End: 2024-10-16
Payer: MEDICARE

## 2024-10-16 DIAGNOSIS — E11.9 TYPE 2 DIABETES MELLITUS WITHOUT COMPLICATION, WITHOUT LONG-TERM CURRENT USE OF INSULIN (MULTI): ICD-10-CM

## 2024-10-16 PROBLEM — R73.9 HYPERGLYCEMIA: Status: RESOLVED | Noted: 2024-06-06 | Resolved: 2024-10-16

## 2024-10-16 PROBLEM — R22.42 LOCALIZED SWELLING, MASS AND LUMP, LEFT LOWER LIMB: Status: RESOLVED | Noted: 2024-06-10 | Resolved: 2024-10-16

## 2024-10-16 PROBLEM — E89.0 POSTOPERATIVE HYPOTHYROIDISM: Status: RESOLVED | Noted: 2021-04-15 | Resolved: 2024-10-16

## 2024-10-16 PROBLEM — R31.9 HEMATURIA: Status: RESOLVED | Noted: 2023-11-07 | Resolved: 2024-10-16

## 2024-10-16 PROBLEM — E11.8 TYPE 2 DIABETES MELLITUS WITH COMPLICATION (MULTI): Status: ACTIVE | Noted: 2024-10-16

## 2024-10-16 PROBLEM — N39.0 URINARY TRACT INFECTION: Status: RESOLVED | Noted: 2023-04-28 | Resolved: 2024-10-16

## 2024-10-16 PROBLEM — L81.4 OTHER MELANIN HYPERPIGMENTATION: Status: RESOLVED | Noted: 2018-02-07 | Resolved: 2024-10-16

## 2024-10-16 PROBLEM — D22.30 MELANOCYTIC NEVI OF UNSPECIFIED PART OF FACE: Status: RESOLVED | Noted: 2018-02-07 | Resolved: 2024-10-16

## 2024-10-16 PROBLEM — D22.60 MELANOCYTIC NEVI OF UNSPECIFIED UPPER LIMB, INCLUDING SHOULDER: Status: RESOLVED | Noted: 2018-02-07 | Resolved: 2024-10-16

## 2024-10-16 PROBLEM — M72.2 PLANTAR FASCIITIS: Status: RESOLVED | Noted: 2018-01-04 | Resolved: 2024-10-16

## 2024-10-16 PROBLEM — L25.9 CONTACT DERMATITIS: Status: RESOLVED | Noted: 2018-01-04 | Resolved: 2024-10-16

## 2024-10-16 PROBLEM — D18.01 HEMANGIOMA OF SKIN AND SUBCUTANEOUS TISSUE: Status: RESOLVED | Noted: 2018-02-07 | Resolved: 2024-10-16

## 2024-10-16 PROBLEM — E78.5 DYSLIPIDEMIA: Status: RESOLVED | Noted: 2024-06-10 | Resolved: 2024-10-16

## 2024-10-16 PROBLEM — E87.6 HYPOKALEMIA: Status: RESOLVED | Noted: 2024-06-06 | Resolved: 2024-10-16

## 2024-10-16 PROBLEM — R31.9 BLOOD IN URINE: Status: RESOLVED | Noted: 2023-11-07 | Resolved: 2024-10-16

## 2024-10-16 PROBLEM — D22.5 MELANOCYTIC NEVI OF TRUNK: Status: RESOLVED | Noted: 2018-02-07 | Resolved: 2024-10-16

## 2024-10-16 PROBLEM — L73.8 OTHER SPECIFIED FOLLICULAR DISORDERS: Status: RESOLVED | Noted: 2018-02-07 | Resolved: 2024-10-16

## 2024-10-16 PROBLEM — E03.9 HYPOTHYROIDISM: Status: ACTIVE | Noted: 2024-10-16

## 2024-10-16 PROBLEM — M79.609 PAIN IN LIMB: Status: RESOLVED | Noted: 2024-06-10 | Resolved: 2024-10-16

## 2024-10-16 PROBLEM — Q66.70: Status: RESOLVED | Noted: 2024-06-10 | Resolved: 2024-10-16

## 2024-10-16 PROBLEM — B35.1 ONYCHOMYCOSIS: Status: RESOLVED | Noted: 2024-06-10 | Resolved: 2024-10-16

## 2024-10-16 PROBLEM — R30.0 DYSURIA: Status: RESOLVED | Noted: 2023-11-07 | Resolved: 2024-10-16

## 2024-10-16 PROBLEM — L91.8 OTHER HYPERTROPHIC DISORDERS OF THE SKIN: Status: RESOLVED | Noted: 2018-02-07 | Resolved: 2024-10-16

## 2024-10-16 PROBLEM — I10 ESSENTIAL (PRIMARY) HYPERTENSION: Status: ACTIVE | Noted: 2024-10-16

## 2024-10-16 RX ORDER — DAPAGLIFLOZIN 10 MG/1
10 TABLET, FILM COATED ORAL DAILY
Qty: 100 TABLET | Refills: 1 | Status: SHIPPED | OUTPATIENT
Start: 2024-10-16

## 2024-10-16 RX ORDER — TAMSULOSIN HYDROCHLORIDE 0.4 MG/1
0.4 CAPSULE ORAL DAILY
Qty: 100 CAPSULE | Refills: 1 | Status: SHIPPED | OUTPATIENT
Start: 2024-10-16

## 2024-10-16 RX ORDER — TRIAMTERENE AND HYDROCHLOROTHIAZIDE 37.5; 25 MG/1; MG/1
1 CAPSULE ORAL DAILY
Qty: 100 CAPSULE | Refills: 1 | Status: SHIPPED | OUTPATIENT
Start: 2024-10-16

## 2024-10-16 RX ORDER — PHENOL/SODIUM PHENOLATE
20 AEROSOL, SPRAY (ML) MUCOUS MEMBRANE DAILY
Qty: 100 TABLET | Refills: 1 | Status: SHIPPED | OUTPATIENT
Start: 2024-10-16

## 2024-10-16 RX ORDER — METOPROLOL TARTRATE 25 MG/1
25 TABLET, FILM COATED ORAL 2 TIMES DAILY
Qty: 200 TABLET | Refills: 1 | Status: SHIPPED | OUTPATIENT
Start: 2024-10-16

## 2024-10-16 RX ORDER — FINASTERIDE 5 MG/1
5 TABLET, FILM COATED ORAL DAILY
Qty: 100 TABLET | Refills: 1 | Status: SHIPPED | OUTPATIENT
Start: 2024-10-16

## 2024-10-16 RX ORDER — LANCETS 33 GAUGE
1 EACH MISCELLANEOUS 2 TIMES DAILY
Qty: 200 EACH | Refills: 1 | Status: SHIPPED | OUTPATIENT
Start: 2024-10-16

## 2024-10-16 RX ORDER — LEVOTHYROXINE SODIUM 100 UG/1
100 TABLET ORAL DAILY
Start: 2024-10-16

## 2024-10-16 ASSESSMENT — ENCOUNTER SYMPTOMS
DIARRHEA: 0
UNEXPECTED WEIGHT CHANGE: 0
FLANK PAIN: 0
FATIGUE: 0
LIGHT-HEADEDNESS: 0
SINUS PRESSURE: 0
DIZZINESS: 0
FREQUENCY: 0
HEADACHES: 0
JOINT SWELLING: 0
POLYPHAGIA: 0
EYE REDNESS: 0
PHOTOPHOBIA: 0
CHILLS: 0
SHORTNESS OF BREATH: 0
SORE THROAT: 0
ABDOMINAL PAIN: 0
NAUSEA: 0
POLYDIPSIA: 0
VOMITING: 0
ACTIVITY CHANGE: 0
TROUBLE SWALLOWING: 0
WHEEZING: 0
DIFFICULTY URINATING: 0
SLEEP DISTURBANCE: 0
FEVER: 0
COUGH: 0
NUMBNESS: 0
DYSURIA: 0
BLOOD IN STOOL: 0
EYE DISCHARGE: 0
BRUISES/BLEEDS EASILY: 0
DYSPHORIC MOOD: 0
OCCASIONAL FEELINGS OF UNSTEADINESS: 0
NERVOUS/ANXIOUS: 0
SINUS PAIN: 0
PALPITATIONS: 0
CHEST TIGHTNESS: 0
WEAKNESS: 0
LOSS OF SENSATION IN FEET: 0
MYALGIAS: 0
CONSTIPATION: 0
ARTHRALGIAS: 0
COLOR CHANGE: 0

## 2024-11-14 DIAGNOSIS — E11.21 TYPE 2 DIABETES MELLITUS WITH DIABETIC NEPHROPATHY, WITHOUT LONG-TERM CURRENT USE OF INSULIN: ICD-10-CM

## 2024-11-14 RX ORDER — DAPAGLIFLOZIN 10 MG/1
10 TABLET, FILM COATED ORAL DAILY
Qty: 100 TABLET | Refills: 1 | Status: SHIPPED | OUTPATIENT
Start: 2024-11-14

## 2024-12-03 ENCOUNTER — APPOINTMENT (OUTPATIENT)
Dept: CARDIOLOGY | Facility: CLINIC | Age: 74
End: 2024-12-03
Payer: MEDICARE

## 2024-12-03 VITALS
WEIGHT: 161 LBS | DIASTOLIC BLOOD PRESSURE: 68 MMHG | BODY MASS INDEX: 22.54 KG/M2 | HEART RATE: 50 BPM | SYSTOLIC BLOOD PRESSURE: 118 MMHG | HEIGHT: 71 IN

## 2024-12-03 DIAGNOSIS — I44.7 LBBB (LEFT BUNDLE BRANCH BLOCK): ICD-10-CM

## 2024-12-03 DIAGNOSIS — Z01.818 PRE-OPERATIVE CLEARANCE: ICD-10-CM

## 2024-12-03 DIAGNOSIS — I25.10 CORONARY ARTERY DISEASE INVOLVING NATIVE CORONARY ARTERY OF NATIVE HEART WITHOUT ANGINA PECTORIS: Primary | ICD-10-CM

## 2024-12-03 DIAGNOSIS — E78.5 DYSLIPIDEMIA: ICD-10-CM

## 2024-12-03 DIAGNOSIS — I10 PRIMARY HYPERTENSION: ICD-10-CM

## 2024-12-03 PROCEDURE — 1159F MED LIST DOCD IN RCRD: CPT | Performed by: STUDENT IN AN ORGANIZED HEALTH CARE EDUCATION/TRAINING PROGRAM

## 2024-12-03 PROCEDURE — 99215 OFFICE O/P EST HI 40 MIN: CPT | Performed by: STUDENT IN AN ORGANIZED HEALTH CARE EDUCATION/TRAINING PROGRAM

## 2024-12-03 PROCEDURE — 3074F SYST BP LT 130 MM HG: CPT | Performed by: STUDENT IN AN ORGANIZED HEALTH CARE EDUCATION/TRAINING PROGRAM

## 2024-12-03 PROCEDURE — 93000 ELECTROCARDIOGRAM COMPLETE: CPT | Performed by: STUDENT IN AN ORGANIZED HEALTH CARE EDUCATION/TRAINING PROGRAM

## 2024-12-03 PROCEDURE — 3078F DIAST BP <80 MM HG: CPT | Performed by: STUDENT IN AN ORGANIZED HEALTH CARE EDUCATION/TRAINING PROGRAM

## 2024-12-03 PROCEDURE — 1036F TOBACCO NON-USER: CPT | Performed by: STUDENT IN AN ORGANIZED HEALTH CARE EDUCATION/TRAINING PROGRAM

## 2024-12-03 RX ORDER — ASPIRIN 81 MG/1
81 TABLET ORAL DAILY
Qty: 90 TABLET | Refills: 3 | Status: SHIPPED | OUTPATIENT
Start: 2024-12-03

## 2024-12-03 RX ORDER — ATORVASTATIN CALCIUM 40 MG/1
40 TABLET, FILM COATED ORAL DAILY
Qty: 30 TABLET | Refills: 11 | Status: SHIPPED | OUTPATIENT
Start: 2024-12-03 | End: 2025-12-03

## 2024-12-03 NOTE — PROGRESS NOTES
[facility] Cardiology Clinic Note    Date: 12/03/24  Time: 11:44 AM    Subjective   Alfonzo Baldwin is a 74 y.o. male with past medical history of bradycardia, hypertension, hyperlipidemia, diabetes, CKD, hypothyroidism who presented to the hospital due to chest pain/NSTEMI.  Patient is coming today for follow-up visit.  Patient had recent NSTEMI and was found to have severe lesion in the LAD.  Patient underwent PCI of mid LAD with resolution of his anginal symptoms.  He was discharged in stable situation.  He is on DAPT and compliant with medical therapy.  His echo had wall motion abnormalities in the LAD territory.  In the hospital his beta-blockers were held because of bradycardia.  He was also on calcium beta-blocker which was also stopped now he is back on beta blocker and heart rate is slower but w/o symptoms. No syncope.  Today patient is here for follow-up visit.  Reports compliance with medication.  Denies any chest pain.  No heart failure symptoms.  No syncope.    Patient has history of polyps and is being planned for EGD/colonoscopy.      Patient has history of CKD and follows with nephrology.    Review of Systems:  Otherwise, limited cardiovascular review of systems is negative.        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  Surgical History:   Past Surgical History:   Procedure Laterality Date    CARDIAC CATHETERIZATION N/A 6/7/2024    Procedure: Left Heart Cath, With LV;  Surgeon: Ludwin Burns MD;  Location[facility];  Service: Cardiovascular;  Laterality: N/A;    CARDIAC CATHETERIZATION N/A 6/7/2024    Procedure: PCI JOY Stent- Coronary;  Surgeon: Ludwin Burns MD;  Location[facility];  Service: Cardiovascular;   Laterality: N/A;    OTHER SURGICAL HISTORY  09/25/2020    Hernia repair    OTHER SURGICAL HISTORY  06/03/2022    Thyroidectomy    OTHER SURGICAL HISTORY  06/03/2022    Parathyroidectomy    OTHER SURGICAL HISTORY  06/03/2022    Colonoscopy   PSHP@  Social History:   Social Drivers of Health with Concerns     Tobacco Use: Medium Risk (12/3/2024)    Patient History     Smoking Tobacco Use: Former     Smokeless Tobacco Use: Never     Passive Exposure: Not on file   Food Insecurity: Not on file   Physical Activity: Insufficiently Active (6/25/2024)    Exercise Vital Sign     Days of Exercise per Week: 2 days     Minutes of Exercise per Session: 50 min   Social Connections: Not on file   Intimate Partner Violence: Not on file   Utilities: Not on file   Digital Equity: Not on file   Health Literacy: Not on file     Family History:   Family History   Problem Relation Name Age of Onset    Heart disease Mother      Prostatitis Father      Other (Cardiac Disorder) Other family hx     Diabetes Other family hx     Other (Malignant Neoplasm of Prostate) Other family hx     Hypertension Other family hx         HTN      Allergies:  Ace inhibitors, Valsartan, and Lisinopril    Outpatient Medications:  Current Outpatient Medications   Medication Instructions    acetaminophen (Tylenol) 500 mg tablet 2 tab(s) orally as needed    aspirin 81 mg, oral, Daily    atorvastatin (LIPITOR) 40 mg, oral, Daily    blood sugar diagnostic (TRUETEST TEST STRIPS MISC)     Farxiga 10 mg, oral, Daily    finasteride (PROSCAR) 5 mg, oral, Daily, Do not crush, chew, or split.    FreeStyle Lancets 28 gauge 2 times daily RT    lancets (Micro Thin Lancets) 33 gauge misc 1 Lancet, Does not apply, 2 times daily    levothyroxine (SYNTHROID, LEVOXYL) 100 mcg, oral, Daily, Take on an empty stomach at the same time each day, either 30 to 60 minutes prior to breakfast    metoprolol tartrate (LOPRESSOR) 25 mg, oral, 2 times daily    omeprazole (PRILOSEC) 20 mg, oral,  "Daily    tamsulosin (FLOMAX) 0.4 mg, oral, Daily    ticagrelor (Brilinta) 90 mg tablet Take 1 tablet (90 mg) by mouth 2 times a day.    triamterene-hydrochlorothiazid (Dyazide) 37.5-25 mg capsule 1 capsule, oral, Daily    vit C,I-Ta-kuiur-lutein-zeaxan (PreserVision AREDS-2) 250-90-40-1 mg capsule 1 capsule, 2 times daily       Objective     Physical Exam  Vitals:    12/03/24 1050   BP: 118/68   Pulse: 50   Weight: 73 kg (161 lb)   Height: 1.803 m (5' 11\")     Wt Readings from Last 3 Encounters:   12/03/24 73 kg (161 lb)   10/15/24 73 kg (161 lb)   09/12/24 73 kg (161 lb)            General: Alert and Oriented, No distress, cooperative  Head: Normocephalic without obvious abnormality, atraumatic  Eyes: Conjunctiva/corneas clear, EOM's grossly intact  Neck: Supple, trachea midline, No thyroid enlargement/tenderness/nodules; No JVD  Lungs: Clear to auscultation bilaterally, no wheezes, rhonci, or rales. respirations unlabored  Chest Wall: No tenderness or deformity  Heart: Regular rhythm, normal S1/S2, no murmur  Abdomen: Soft, non-tender, Non-distended, bowel sounds active  Extremities: No edema, no cyanosis, no clubbing  Skin: Skin color, texture, turgor normal.  No rashes or lesions noted  Neurologic: Alert and oriented x 3, grossly moving all extremities, speech intact            I have personally reviewed the following images and laboratory findings:  ECG: sinus bradycardia with PVC, LBBB    Echocardiogram:   PHYSICIAN INTERPRETATION:  Left Ventricle: Left ventricular systolic function is low normal, with an estimated ejection fraction of 50-55%. There are no regional wall motion abnormalities. The left ventricular cavity size is normal. Spectral Doppler shows an impaired relaxation pattern of left ventricular diastolic filling.  LV Wall Scoring:  The entire septum, apical anterior segment, and apex are hypokinetic. All  remaining scored segments are normal.     Left Atrium: The left atrium is normal in " size.  Right Ventricle: The right ventricle is normal in size. There is normal right ventricular global systolic function.  Right Atrium: The right atrium is normal in size.  Aortic Valve: The aortic valve is trileaflet. There is trivial aortic valve regurgitation. The peak instantaneous gradient of the aortic valve is 7.6 mmHg. The mean gradient of the aortic valve is 3.7 mmHg.  Mitral Valve: The mitral valve is normal in structure. There is no evidence of mitral valve regurgitation.  Tricuspid Valve: The tricuspid valve is structurally normal. There is trace tricuspid regurgitation. The Doppler estimated RVSP is within normal limits at 23.8 mmHg.  Pulmonic Valve: The pulmonic valve is structurally normal. There is mild pulmonic valve regurgitation.  Pericardium: There is no pericardial effusion noted.  Aorta: The aortic root is normal.        CONCLUSIONS:   1. Left ventricular systolic function is low normal with a 50-55% estimated ejection fraction.   2. Spectral Doppler shows an impaired relaxation pattern of left ventricular diastolic filling.   3. Entire septum, apical anterior segment, and apex are abnormal.   4. RVSP within normal limits.  Recommendations:  Maximize medical therapy.  Agressive risk factor modification efforts.  Follow-up with cardiology clinic.  Lipid lowering agent or Statin therapy.  DAPT for 12 months minimum.  Consider referral to cardiac rehabilitation.     ____________________________________________________________________________________  CONCLUSIONS:   1. Culprit vessel(s): left anterior descending.   2. Severe mid LAD stenosis, Culprit for ACS. Status post successful PCI with deployment of 2.75 x 30 mm Mandeep frontier JOY, postdilated with 3.0 NC balloon at high pressure.   3. Left Ventricular end-diastolic pressure = 13 mmHg.  Laboratory values:   No visits with results within 2 Month(s) from this visit.   Latest known visit with results is:   Office Visit on 09/12/2024   Component  Date Value    POC HEMOGLOBIN A1c 09/12/2024 6.8 (A)     POC Fingerstick Blood Gl* 09/12/2024 94      CBC -  Lab Results   Component Value Date    WBC 6.5 06/08/2024    HGB 16.6 06/08/2024    HCT 45.9 06/08/2024    MCV 81 06/08/2024     06/08/2024       CMP -  Lab Results   Component Value Date    CALCIUM 8.3 (L) 06/08/2024    PHOS 2.8 06/08/2024    PROT 7.2 06/06/2024    ALBUMIN 3.6 06/08/2024    AST 26 06/06/2024    ALT 34 06/06/2024    ALKPHOS 47 06/06/2024    BILITOT 0.6 06/06/2024       LIPID PANEL -   Lab Results   Component Value Date    CHOL 127 06/08/2024    HDL 26.7 06/08/2024    CHHDL 4.8 06/08/2024    VLDL 52 (H) 06/08/2024    TRIG 260 (H) 06/08/2024    NHDL 100 06/08/2024       RENAL FUNCTION PANEL -   Lab Results   Component Value Date    K 4.1 06/08/2024    PHOS 2.8 06/08/2024       Lab Results   Component Value Date    BNP 74 06/06/2024    HGBA1C 6.8 (A) 09/12/2024        Assessment/Plan   CAD status post PCI of LAD  Essential hypertension  LBBB  Hyperlipidemia  CKD  Diabetes mellitus  History of colon polyp       Plan:    -I will continue DAPT with aspirin and Brilinta for minimum of 1 year since ACS event earlier this year.  -Discussed with patient regarding timing of colonoscopy.  If this is a screening test then I would recommend to defer it until 1 year post ACS.  If colonoscopy cannot be delayed then okay to perform at 6-month post PCI without additional cardiac workup.  Patient will have to continue aspirin 81 mg uninterrupted.  Hold Brilinta for 48 hours prior to procedure and resume as soon as possible.  Ideally will recommend that EGD/colonoscopy be delayed until June next year.  Patient will discuss with GI team.  Patient follows with GI in [facility].  -Will change to atorvastatin 40 mg nightly.  -I will continue on low-dose metoprolol.  -Patient completed cardiac rehab and feels much better in terms of energy.  -Continue triamterene/hydrochlorothiazide as previously  taking.  -Follow-up with nephrology for CKD.     RTC as scheduled.  In addition, the following orders were placed today:  Orders Placed This Encounter   Procedures    ECG 12 lead (Clinic Performed)                 SIGNATURE: Ludwin Burns MD PATIENT NAME: Alfonzo Baldwin   DATE/TIME: December 3, 2024 11:44 AM MRN: [id number]

## 2024-12-11 ENCOUNTER — APPOINTMENT (OUTPATIENT)
Dept: PRIMARY CARE | Facility: CLINIC | Age: 74
End: 2024-12-11
Payer: COMMERCIAL

## 2024-12-11 VITALS
SYSTOLIC BLOOD PRESSURE: 122 MMHG | DIASTOLIC BLOOD PRESSURE: 60 MMHG | HEIGHT: 71 IN | WEIGHT: 159 LBS | OXYGEN SATURATION: 94 % | BODY MASS INDEX: 22.26 KG/M2 | HEART RATE: 46 BPM | TEMPERATURE: 97.9 F

## 2024-12-11 DIAGNOSIS — N18.9 CHRONIC KIDNEY DISEASE, UNSPECIFIED CKD STAGE: ICD-10-CM

## 2024-12-11 DIAGNOSIS — I10 ESSENTIAL (PRIMARY) HYPERTENSION: ICD-10-CM

## 2024-12-11 DIAGNOSIS — E11.65 TYPE 2 DIABETES MELLITUS WITH HYPERGLYCEMIA, WITHOUT LONG-TERM CURRENT USE OF INSULIN: Primary | ICD-10-CM

## 2024-12-11 DIAGNOSIS — E21.3 HYPERPARATHYROIDISM (MULTI): ICD-10-CM

## 2024-12-11 DIAGNOSIS — I21.4 NSTEMI (NON-ST ELEVATED MYOCARDIAL INFARCTION) (MULTI): ICD-10-CM

## 2024-12-11 LAB
POC FINGERSTICK BLOOD GLUCOSE: 160 MG/DL (ref 70–100)
POC HEMOGLOBIN A1C: 6.7 % (ref 4.2–6.5)

## 2024-12-11 PROCEDURE — 3074F SYST BP LT 130 MM HG: CPT | Performed by: NURSE PRACTITIONER

## 2024-12-11 PROCEDURE — 1160F RVW MEDS BY RX/DR IN RCRD: CPT | Performed by: NURSE PRACTITIONER

## 2024-12-11 PROCEDURE — 83036 HEMOGLOBIN GLYCOSYLATED A1C: CPT | Performed by: NURSE PRACTITIONER

## 2024-12-11 PROCEDURE — 3078F DIAST BP <80 MM HG: CPT | Performed by: NURSE PRACTITIONER

## 2024-12-11 PROCEDURE — 99214 OFFICE O/P EST MOD 30 MIN: CPT | Performed by: NURSE PRACTITIONER

## 2024-12-11 PROCEDURE — 82962 GLUCOSE BLOOD TEST: CPT | Performed by: NURSE PRACTITIONER

## 2024-12-11 PROCEDURE — 1159F MED LIST DOCD IN RCRD: CPT | Performed by: NURSE PRACTITIONER

## 2024-12-11 PROCEDURE — 1036F TOBACCO NON-USER: CPT | Performed by: NURSE PRACTITIONER

## 2024-12-11 ASSESSMENT — ENCOUNTER SYMPTOMS
MUSCULOSKELETAL NEGATIVE: 1
GASTROINTESTINAL NEGATIVE: 1
ENDOCRINE NEGATIVE: 1
RESPIRATORY NEGATIVE: 1
PSYCHIATRIC NEGATIVE: 1
CARDIOVASCULAR NEGATIVE: 1
CONSTITUTIONAL NEGATIVE: 1
NEUROLOGICAL NEGATIVE: 1

## 2024-12-11 NOTE — PROGRESS NOTES
" Alfonzo Baldwin is a 74 y.o. here for a diabetic follow up exam.     Pt states they are doing well. Following a low carbohydrate diet and is active.     Up to date with eye and foot exams, pcp visits.     Last wt 161, 159 today     Card last week or so doing well  Dr sam rangel and [facility]  Dr pearson as well renal yearly  Dr thomason  care  [facility]     Meds:  Farziga 10mg daily    Fb today 160  Aic 6.7% (6.8, 7.2)    3mo f/u          Lab Results   Component Value Date    POCGLU 160 (A) 12/11/2024    POCGLU 94 09/12/2024    POCGLU 113 (A) 06/11/2024    POCGLU 215 (A) 03/08/2024    POCGLU 172 (A) 12/08/2023    HGBA1C 6.7 (A) 12/11/2024    HGBA1C 6.8 (A) 09/12/2024    HGBA1C 7.2 (A) 06/11/2024    HGBA1C 7.2 (A) 03/08/2024    HGBA1C 7.8 (A) 12/08/2023     /60   Pulse (!) 46   Temp 36.6 °C (97.9 °F)   Ht 1.803 m (5' 11\")   Wt 72.1 kg (159 lb)   SpO2 94%   BMI 22.18 kg/m²    Wt Readings from Last 5 Encounters:   12/11/24 72.1 kg (159 lb)   12/03/24 73 kg (161 lb)   10/15/24 73 kg (161 lb)   09/12/24 73 kg (161 lb)   06/25/24 74.3 kg (163 lb 12.8 oz)          Lab Results   Component Value Date    CREATU 159.0 03/02/2021    MICROALBCREA 18.9 01/18/2021        Review of Systems   Constitutional: Negative.    HENT: Negative.     Respiratory: Negative.     Cardiovascular: Negative.    Gastrointestinal: Negative.    Endocrine: Negative.    Genitourinary: Negative.    Musculoskeletal: Negative.    Skin: Negative.    Neurological: Negative.    Psychiatric/Behavioral: Negative.          Physical Exam  Vitals and nursing note reviewed.   Constitutional:       Appearance: Normal appearance.   HENT:      Head: Normocephalic.   Eyes:      Pupils: Pupils are equal, round, and reactive to light.   Cardiovascular:      Rate and Rhythm: Normal rate and regular rhythm.      Heart sounds: Normal heart sounds.   Pulmonary:      Effort: Pulmonary effort is normal.      Breath sounds: " Normal breath sounds.   Skin:     General: Skin is warm and dry.   Neurological:      General: No focal deficit present.      Mental Status: He is alert and oriented to person, place, and time. Mental status is at baseline.   Psychiatric:         Mood and Affect: Mood normal.         Behavior: Behavior normal.         Thought Content: Thought content normal.         Judgment: Judgment normal.        Problem List Items Addressed This Visit             ICD-10-CM    Hyperparathyroidism (Multi) E21.3    Chronic kidney disease N18.9    NSTEMI (non-ST elevated myocardial infarction) (Multi) I21.4    Type 2 diabetes mellitus - Primary E11.9    Relevant Orders    POCT glycosylated hemoglobin (Hb A1C) manually resulted (Completed)    POCT fingerstick glucose manually resulted (Completed)    Essential (primary) hypertension I10           Laura L Seaver, APRN-CNP

## 2025-01-06 DIAGNOSIS — K21.9 GASTROESOPHAGEAL REFLUX DISEASE WITHOUT ESOPHAGITIS: ICD-10-CM

## 2025-01-06 DIAGNOSIS — E03.9 HYPOTHYROIDISM, UNSPECIFIED TYPE: ICD-10-CM

## 2025-01-06 DIAGNOSIS — I25.10 CORONARY ARTERY DISEASE INVOLVING NATIVE CORONARY ARTERY OF NATIVE HEART WITHOUT ANGINA PECTORIS: ICD-10-CM

## 2025-01-06 DIAGNOSIS — I10 ESSENTIAL (PRIMARY) HYPERTENSION: ICD-10-CM

## 2025-01-06 DIAGNOSIS — N40.1 BENIGN PROSTATIC HYPERPLASIA WITH LOWER URINARY TRACT SYMPTOMS, SYMPTOM DETAILS UNSPECIFIED: ICD-10-CM

## 2025-01-09 ENCOUNTER — TELEPHONE (OUTPATIENT)
Dept: PRIMARY CARE | Facility: CLINIC | Age: 75
End: 2025-01-09
Payer: MEDICARE

## 2025-01-12 RX ORDER — METOPROLOL TARTRATE 25 MG/1
25 TABLET, FILM COATED ORAL 2 TIMES DAILY
Qty: 200 TABLET | Refills: 1 | Status: SHIPPED | OUTPATIENT
Start: 2025-01-12

## 2025-01-12 RX ORDER — FINASTERIDE 5 MG/1
5 TABLET, FILM COATED ORAL DAILY
Qty: 100 TABLET | Refills: 1 | Status: SHIPPED | OUTPATIENT
Start: 2025-01-12

## 2025-01-12 RX ORDER — TRIAMTERENE AND HYDROCHLOROTHIAZIDE 37.5; 25 MG/1; MG/1
1 CAPSULE ORAL DAILY
Qty: 100 CAPSULE | Refills: 1 | Status: SHIPPED | OUTPATIENT
Start: 2025-01-12

## 2025-01-12 RX ORDER — TAMSULOSIN HYDROCHLORIDE 0.4 MG/1
0.4 CAPSULE ORAL DAILY
Qty: 100 CAPSULE | Refills: 1 | Status: SHIPPED | OUTPATIENT
Start: 2025-01-12

## 2025-01-12 RX ORDER — LEVOTHYROXINE SODIUM 100 UG/1
100 TABLET ORAL DAILY
Qty: 100 TABLET | Refills: 1 | Status: SHIPPED | OUTPATIENT
Start: 2025-01-12

## 2025-01-12 RX ORDER — ATORVASTATIN CALCIUM 40 MG/1
40 TABLET, FILM COATED ORAL DAILY
Qty: 30 TABLET | Refills: 11 | Status: SHIPPED | OUTPATIENT
Start: 2025-01-12 | End: 2026-01-12

## 2025-01-12 RX ORDER — PHENOL/SODIUM PHENOLATE
20 AEROSOL, SPRAY (ML) MUCOUS MEMBRANE DAILY
Qty: 100 TABLET | Refills: 1 | Status: SHIPPED | OUTPATIENT
Start: 2025-01-12

## 2025-01-17 DIAGNOSIS — K21.9 GASTROESOPHAGEAL REFLUX DISEASE WITHOUT ESOPHAGITIS: Primary | ICD-10-CM

## 2025-01-17 DIAGNOSIS — I25.10 CORONARY ARTERY DISEASE INVOLVING NATIVE CORONARY ARTERY OF NATIVE HEART WITHOUT ANGINA PECTORIS: ICD-10-CM

## 2025-01-17 RX ORDER — OMEPRAZOLE 20 MG/1
20 CAPSULE, DELAYED RELEASE ORAL DAILY
Qty: 100 CAPSULE | Refills: 5 | Status: SHIPPED | OUTPATIENT
Start: 2025-01-17 | End: 2025-01-17 | Stop reason: SDUPTHER

## 2025-01-17 RX ORDER — ATORVASTATIN CALCIUM 40 MG/1
40 TABLET, FILM COATED ORAL DAILY
Qty: 100 TABLET | Refills: 1 | Status: SHIPPED | OUTPATIENT
Start: 2025-01-17

## 2025-01-17 RX ORDER — OMEPRAZOLE 20 MG/1
20 CAPSULE, DELAYED RELEASE ORAL DAILY
Qty: 100 CAPSULE | Refills: 1 | Status: SHIPPED | OUTPATIENT
Start: 2025-01-17

## 2025-02-05 ENCOUNTER — APPOINTMENT (OUTPATIENT)
Dept: PHARMACY | Facility: HOSPITAL | Age: 75
End: 2025-02-05
Payer: MEDICARE

## 2025-03-04 ENCOUNTER — TELEPHONE (OUTPATIENT)
Dept: CARDIOLOGY | Facility: HOSPITAL | Age: 75
End: 2025-03-04
Payer: MEDICARE

## 2025-03-04 NOTE — TELEPHONE ENCOUNTER
RN called and spoke with patient, notified that per [facility] staff patient should have enough medication to last until June. Patient has a 10 day supply ready to be picked up and the rest in tomorrow. RN instructed patient to call back when he needs a refill.

## 2025-03-04 NOTE — TELEPHONE ENCOUNTER
PT called wanting a refill on his Brilliant - would like to be called over to [facility]   Brilliant 90mg Twice daily     Routing to Nurse Santillan.

## 2025-03-04 NOTE — TELEPHONE ENCOUNTER
RN called and spoke with pharmacy staff, notified of patient's request for Brilinta. Per pharmacy staff patient has enough to last until June.

## 2025-03-11 ENCOUNTER — APPOINTMENT (OUTPATIENT)
Dept: PRIMARY CARE | Facility: CLINIC | Age: 75
End: 2025-03-11
Payer: COMMERCIAL

## 2025-03-11 VITALS
RESPIRATION RATE: 16 BRPM | DIASTOLIC BLOOD PRESSURE: 70 MMHG | TEMPERATURE: 97.5 F | HEART RATE: 46 BPM | SYSTOLIC BLOOD PRESSURE: 110 MMHG | BODY MASS INDEX: 22.96 KG/M2 | WEIGHT: 164 LBS | HEIGHT: 71 IN

## 2025-03-11 DIAGNOSIS — I21.4 NSTEMI (NON-ST ELEVATED MYOCARDIAL INFARCTION) (MULTI): ICD-10-CM

## 2025-03-11 DIAGNOSIS — N18.9 CHRONIC KIDNEY DISEASE, UNSPECIFIED CKD STAGE: ICD-10-CM

## 2025-03-11 DIAGNOSIS — E11.21 TYPE 2 DIABETES MELLITUS WITH DIABETIC NEPHROPATHY, WITHOUT LONG-TERM CURRENT USE OF INSULIN: ICD-10-CM

## 2025-03-11 DIAGNOSIS — E21.3 HYPERPARATHYROIDISM (MULTI): ICD-10-CM

## 2025-03-11 DIAGNOSIS — E11.65 TYPE 2 DIABETES MELLITUS WITH HYPERGLYCEMIA, WITHOUT LONG-TERM CURRENT USE OF INSULIN: Primary | ICD-10-CM

## 2025-03-11 DIAGNOSIS — I10 ESSENTIAL (PRIMARY) HYPERTENSION: ICD-10-CM

## 2025-03-11 LAB
POC FINGERSTICK BLOOD GLUCOSE: 130 MG/DL (ref 70–100)
POC HEMOGLOBIN A1C: 6.7 % (ref 4.2–6.5)

## 2025-03-11 PROCEDURE — 82962 GLUCOSE BLOOD TEST: CPT | Performed by: NURSE PRACTITIONER

## 2025-03-11 PROCEDURE — 1159F MED LIST DOCD IN RCRD: CPT | Performed by: NURSE PRACTITIONER

## 2025-03-11 PROCEDURE — 3078F DIAST BP <80 MM HG: CPT | Performed by: NURSE PRACTITIONER

## 2025-03-11 PROCEDURE — 1160F RVW MEDS BY RX/DR IN RCRD: CPT | Performed by: NURSE PRACTITIONER

## 2025-03-11 PROCEDURE — 83036 HEMOGLOBIN GLYCOSYLATED A1C: CPT | Performed by: NURSE PRACTITIONER

## 2025-03-11 PROCEDURE — 99214 OFFICE O/P EST MOD 30 MIN: CPT | Performed by: NURSE PRACTITIONER

## 2025-03-11 PROCEDURE — 3074F SYST BP LT 130 MM HG: CPT | Performed by: NURSE PRACTITIONER

## 2025-03-11 RX ORDER — DAPAGLIFLOZIN 10 MG/1
10 TABLET, FILM COATED ORAL DAILY
Qty: 100 TABLET | Refills: 3 | Status: SHIPPED | OUTPATIENT
Start: 2025-03-11

## 2025-03-11 ASSESSMENT — ENCOUNTER SYMPTOMS
CONSTITUTIONAL NEGATIVE: 1
MUSCULOSKELETAL NEGATIVE: 1
RESPIRATORY NEGATIVE: 1
ENDOCRINE NEGATIVE: 1
GASTROINTESTINAL NEGATIVE: 1
PSYCHIATRIC NEGATIVE: 1
NEUROLOGICAL NEGATIVE: 1
CARDIOVASCULAR NEGATIVE: 1

## 2025-03-11 NOTE — PROGRESS NOTES
" Alfonzo Baldwin is a 75 y.o. here for a diabetic follow up exam.     Pt states they are doing well. Following a low carbohydrate diet and is active.     Up to date with eye and foot exams, pcp visits.     Last wt 159  - 164 today   Last bmi 22.18  Last aic 6.7 goal aic under 7 (6.8, 7.2, 7.2, 7.8) - 6.7 today  Utd with pcp 10/24 will be getting labs ordered 10/24  Utd with several specialties including renal, card (citlalli), endo (rahul).  Also has clinical pharmacist involved in care, and for help with price of farxiga/meds.  [facility]  Dr hamm 'no longer needs to see him'     Meds:  Farxiga 10mg daily       Lab Results   Component Value Date    POCGLU 130 (A) 03/11/2025    POCGLU 160 (A) 12/11/2024    POCGLU 94 09/12/2024    POCGLU 113 (A) 06/11/2024    POCGLU 215 (A) 03/08/2024    HGBA1C 6.7 (A) 03/11/2025    HGBA1C 6.7 (A) 12/11/2024    HGBA1C 6.8 (A) 09/12/2024    HGBA1C 7.2 (A) 06/11/2024    HGBA1C 7.2 (A) 03/08/2024     /70   Pulse (!) 46   Temp 36.4 °C (97.5 °F) (Temporal)   Resp 16   Ht 1.803 m (5' 11\")   Wt 74.4 kg (164 lb)   BMI 22.87 kg/m²    Wt Readings from Last 5 Encounters:   03/11/25 74.4 kg (164 lb)   12/11/24 72.1 kg (159 lb)   12/03/24 73 kg (161 lb)   10/15/24 73 kg (161 lb)   09/12/24 73 kg (161 lb)          Lab Results   Component Value Date    CREATU 159.0 03/02/2021    MICROALBCREA 18.9 01/18/2021        Review of Systems   Constitutional: Negative.    HENT: Negative.     Respiratory: Negative.     Cardiovascular: Negative.    Gastrointestinal: Negative.    Endocrine: Negative.    Genitourinary: Negative.    Musculoskeletal: Negative.    Skin: Negative.    Neurological: Negative.    Psychiatric/Behavioral: Negative.          Problem List Items Addressed This Visit             ICD-10-CM    Hyperparathyroidism (Multi) E21.3    Chronic kidney disease N18.9    NSTEMI (non-ST elevated myocardial infarction) (Multi) I21.4    Type 2 diabetes mellitus - Primary " E11.9    Relevant Medications    dapagliflozin propanediol (Farxiga) 10 mg    Other Relevant Orders    POCT glycosylated hemoglobin (Hb A1C) manually resulted (Completed)    POCT fingerstick glucose manually resulted (Completed)    Essential (primary) hypertension I10         Laura L Seaver, APRN-CNP

## 2025-06-02 ENCOUNTER — TELEPHONE (OUTPATIENT)
Dept: CARDIOLOGY | Facility: HOSPITAL | Age: 75
End: 2025-06-02
Payer: MEDICARE

## 2025-06-02 NOTE — TELEPHONE ENCOUNTER
Called pt to go over current medications before appt. I LVM asking for pt to bring in a current med list to their next appt.    Anna BYRNE

## 2025-06-03 ENCOUNTER — APPOINTMENT (OUTPATIENT)
Dept: CARDIOLOGY | Facility: CLINIC | Age: 75
End: 2025-06-03
Payer: MEDICARE

## 2025-06-03 VITALS
OXYGEN SATURATION: 98 % | HEART RATE: 47 BPM | SYSTOLIC BLOOD PRESSURE: 120 MMHG | DIASTOLIC BLOOD PRESSURE: 64 MMHG | HEIGHT: 71 IN | BODY MASS INDEX: 23.13 KG/M2 | WEIGHT: 165.2 LBS

## 2025-06-03 DIAGNOSIS — I10 ESSENTIAL (PRIMARY) HYPERTENSION: ICD-10-CM

## 2025-06-03 DIAGNOSIS — I25.10 CORONARY ARTERY DISEASE INVOLVING NATIVE CORONARY ARTERY OF NATIVE HEART WITHOUT ANGINA PECTORIS: Primary | ICD-10-CM

## 2025-06-03 DIAGNOSIS — E78.5 HYPERLIPIDEMIA, UNSPECIFIED HYPERLIPIDEMIA TYPE: ICD-10-CM

## 2025-06-03 DIAGNOSIS — I44.7 LBBB (LEFT BUNDLE BRANCH BLOCK): ICD-10-CM

## 2025-06-03 PROCEDURE — 3078F DIAST BP <80 MM HG: CPT | Performed by: STUDENT IN AN ORGANIZED HEALTH CARE EDUCATION/TRAINING PROGRAM

## 2025-06-03 PROCEDURE — 1036F TOBACCO NON-USER: CPT | Performed by: STUDENT IN AN ORGANIZED HEALTH CARE EDUCATION/TRAINING PROGRAM

## 2025-06-03 PROCEDURE — 99215 OFFICE O/P EST HI 40 MIN: CPT | Performed by: STUDENT IN AN ORGANIZED HEALTH CARE EDUCATION/TRAINING PROGRAM

## 2025-06-03 PROCEDURE — 1159F MED LIST DOCD IN RCRD: CPT | Performed by: STUDENT IN AN ORGANIZED HEALTH CARE EDUCATION/TRAINING PROGRAM

## 2025-06-03 PROCEDURE — 3074F SYST BP LT 130 MM HG: CPT | Performed by: STUDENT IN AN ORGANIZED HEALTH CARE EDUCATION/TRAINING PROGRAM

## 2025-06-03 PROCEDURE — 93000 ELECTROCARDIOGRAM COMPLETE: CPT | Performed by: STUDENT IN AN ORGANIZED HEALTH CARE EDUCATION/TRAINING PROGRAM

## 2025-06-03 RX ORDER — ASPIRIN 81 MG/1
81 TABLET ORAL DAILY
Qty: 90 TABLET | Refills: 3 | Status: SHIPPED | OUTPATIENT
Start: 2025-06-03

## 2025-06-03 RX ORDER — METOPROLOL TARTRATE 25 MG/1
12.5 TABLET, FILM COATED ORAL 2 TIMES DAILY
Qty: 90 TABLET | Refills: 3 | Status: SHIPPED | OUTPATIENT
Start: 2025-06-03 | End: 2026-06-03

## 2025-06-03 NOTE — PROGRESS NOTES
[facility] Note    Date: 06/03/25  Time: 11:30 AM    Subjective   Alfonzo Baldwin is a 75 y.o. male who is coming to clinic for f/up visit. Patient with past medical history of bradycardia, hypertension, hyperlipidemia, diabetes, CKD, hypothyroidism who presented to the hospital due to chest pain/NSTEMI.  Patient had NSTEMI and was found to have severe lesion in the LAD.  Patient underwent PCI of mid LAD with resolution of his anginal symptoms.  He is on DAPT and compliant with medical therapy, completed 12 months of DAPT. He reports easy bruising.  His echo had wall motion abnormalities in the LAD territory.  In the hospital his beta-blockers were held because of bradycardia.  He was also on calcium beta-blocker which was also stopped now he is back on beta blocker and heart rate is slower but w/o symptoms. No syncope.  His heart rate through remains on lower side. He has underlying conduction system diz with LBBB, first degree AVB.     Today he Denies any chest pain.  No heart failure symptoms.  No syncope.     Patient has history of polyps and is being planned for colonoscopy soon     Patient has history of CKD and follows with nephrology.    Review of Systems:  Otherwise, limited cardiovascular review of systems is negative.        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  Surgical History:   Surgical History[1]PSHP@  Social History:   Social Drivers of Health with Concerns     Tobacco Use: Medium Risk (6/3/2025)    Patient History     Smoking Tobacco Use: Former     Smokeless Tobacco Use: Never     Passive Exposure: Not on file   Food Insecurity: Not on file   Physical Activity: Insufficiently Active (6/25/2024)    Exercise Vital Sign  "    Days of Exercise per Week: 2 days     Minutes of Exercise per Session: 50 min   Social Connections: Not on file   Intimate Partner Violence: Not on file   Utilities: Not on file   Digital Equity: Not on file   Health Literacy: Not on file     Family History:   Family History[2]   Allergies:  Ace inhibitors, Valsartan, and Lisinopril    Outpatient Medications:  Current Outpatient Medications   Medication Instructions    acetaminophen (Tylenol) 500 mg tablet 2 tab(s) orally as needed    aspirin 81 mg, oral, Daily    atorvastatin (LIPITOR) 40 mg, oral, Daily    blood sugar diagnostic (TRUETEST TEST STRIPS MISC)     dapagliflozin propanediol (FARXIGA) 10 mg, oral, Daily    finasteride (PROSCAR) 5 mg, oral, Daily, DO NOT CRUSH CHEW OR SPLIT    FreeStyle Lancets 28 gauge 2 times daily RT    levothyroxine (Synthroid, Levoxyl) 100 mcg tablet TAKE 1 TABLET BY MOUTH EARLY IN  THE MORNING ON AN EMPTY STOMACH  AT THE SAME TIME EACH DAY EITHER 30 TO 60 MINUTES PRIOR TO  BREAKFAST    metoprolol tartrate (LOPRESSOR) 12.5 mg, oral, 2 times daily    omeprazole (PRILOSEC) 20 mg, oral, Daily, Do not crush or chew.    tamsulosin (FLOMAX) 0.4 mg, oral, Daily    vit C,W-Me-xrkbv-lutein-zeaxan (PreserVision AREDS-2) 250-90-40-1 mg capsule 1 capsule, 2 times daily       Objective     Physical Exam  Vitals:    06/03/25 1038   BP: 120/64   BP Location: Right arm   Patient Position: Sitting   BP Cuff Size: Adult   Pulse: (!) 47   SpO2: 98%   Weight: 74.9 kg (165 lb 3.2 oz)   Height: 1.803 m (5' 11\")     Wt Readings from Last 3 Encounters:   06/03/25 74.9 kg (165 lb 3.2 oz)   03/11/25 74.4 kg (164 lb)   12/11/24 72.1 kg (159 lb)         General: Alert and Oriented, No distress, cooperative  Head: Normocephalic without obvious abnormality, atraumatic  Eyes: Conjunctiva/corneas clear, EOM's grossly intact  Neck: Supple, trachea midline, No thyroid enlargement/tenderness/nodules; No JVD  Lungs: Clear to auscultation bilaterally, no wheezes, " rhonci, or rales. respirations unlabored  Chest Wall: No tenderness or deformity  Heart: Regular rhythm, normal S1/S2, no murmur  Abdomen: Soft, non-tender, Non-distended, bowel sounds active  Extremities: No edema, no cyanosis, no clubbing  Skin: Skin color, texture, turgor normal.  No rashes or lesions noted  Neurologic: Alert and oriented x 3, grossly moving all extremities, speech intact         I have personally reviewed the following images and laboratory findings:  ECG: Sinus bradycardia, with first degree AVB, LBBB    Echocardiogram:   PHYSICIAN INTERPRETATION:  Left Ventricle: Left ventricular systolic function is low normal, with an estimated ejection fraction of 50-55%. There are no regional wall motion abnormalities. The left ventricular cavity size is normal. Spectral Doppler shows an impaired relaxation pattern of left ventricular diastolic filling.  LV Wall Scoring:  The entire septum, apical anterior segment, and apex are hypokinetic. All  remaining scored segments are normal.     Left Atrium: The left atrium is normal in size.  Right Ventricle: The right ventricle is normal in size. There is normal right ventricular global systolic function.  Right Atrium: The right atrium is normal in size.  Aortic Valve: The aortic valve is trileaflet. There is trivial aortic valve regurgitation. The peak instantaneous gradient of the aortic valve is 7.6 mmHg. The mean gradient of the aortic valve is 3.7 mmHg.  Mitral Valve: The mitral valve is normal in structure. There is no evidence of mitral valve regurgitation.  Tricuspid Valve: The tricuspid valve is structurally normal. There is trace tricuspid regurgitation. The Doppler estimated RVSP is within normal limits at 23.8 mmHg.  Pulmonic Valve: The pulmonic valve is structurally normal. There is mild pulmonic valve regurgitation.  Pericardium: There is no pericardial effusion noted.  Aorta: The aortic root is normal.        CONCLUSIONS:   1. Left ventricular  systolic function is low normal with a 50-55% estimated ejection fraction.   2. Spectral Doppler shows an impaired relaxation pattern of left ventricular diastolic filling.   3. Entire septum, apical anterior segment, and apex are abnormal.   4. RVSP within normal limits.  Recommendations:  Maximize medical therapy.  Agressive risk factor modification efforts.  Follow-up with cardiology clinic.  Lipid lowering agent or Statin therapy.  DAPT for 12 months minimum.  Consider referral to cardiac rehabilitation.     ____________________________________________________________________________________  CONCLUSIONS:   1. Culprit vessel(s): left anterior descending.   2. Severe mid LAD stenosis, Culprit for ACS. Status post successful PCI with deployment of 2.75 x 30 mm Mandeep frontier JOY, postdilated with 3.0 NC balloon at high pressure.   3. Left Ventricular end-diastolic pressure = 13 mmHg.    Laboratory values:   No visits with results within 2 Month(s) from this visit.   Latest known visit with results is:   Office Visit on 03/11/2025   Component Date Value    POC HEMOGLOBIN A1c 03/11/2025 6.7 (A)     POC Fingerstick Blood Gl* 03/11/2025 130 (A)      CBC -  Lab Results   Component Value Date    WBC 6.5 06/08/2024    HGB 16.6 06/08/2024    HCT 45.9 06/08/2024    MCV 81 06/08/2024     06/08/2024       CMP -  Lab Results   Component Value Date    CALCIUM 8.3 (L) 06/08/2024    PHOS 2.8 06/08/2024    PROT 7.2 06/06/2024    ALBUMIN 3.6 06/08/2024    AST 26 06/06/2024    ALT 34 06/06/2024    ALKPHOS 47 06/06/2024    BILITOT 0.6 06/06/2024       LIPID PANEL -   Lab Results   Component Value Date    CHOL 127 06/08/2024    HDL 26.7 06/08/2024    CHHDL 4.8 06/08/2024    VLDL 52 (H) 06/08/2024    TRIG 260 (H) 06/08/2024    NHDL 100 06/08/2024       RENAL FUNCTION PANEL -   Lab Results   Component Value Date    K 4.1 06/08/2024    PHOS 2.8 06/08/2024       Lab Results   Component Value Date    BNP 74 06/06/2024    HGBA1C 6.7  (A) 03/11/2025        Assessment/Plan   CAD status post PCI of LAD  Essential hypertension  LBBB  Hyperlipidemia  CKD  Diabetes mellitus  History of colon polyp        Plan:     -Patient has completed 12 months DAPT. Okay to stop Ticagrelor for now. Need to continue ASA 81 mg lifelong.   -Will continue atorvastatin 40 mg nightly.  -I will reduce to 12.5 mg bid metoprolol.   -patient on hydrochlorothiazide-trimterene for BP chronically, okay to continue.   -Follow-up with nephrology for CKD.  -I will check CBC, BMP, and lipid panel      In addition, the following orders were placed today:  Orders Placed This Encounter   Procedures    CBC    Basic metabolic panel    Lipid panel    ECG 12 Lead                 SIGNATURE: Ludwin Burns MD PATIENT NAME: Alfonzo Baldwin   DATE/TIME: Barbie 3, 2025 11:30 AM MRN: [id number]                                  [1]   Past Surgical History:  Procedure Laterality Date    CARDIAC CATHETERIZATION N/A 6/7/2024    Procedure: Left Heart Cath, With LV;  Surgeon: Ludwin Burns MD;  Location: [facility];  Service: Cardiovascular;  Laterality: N/A;    CARDIAC CATHETERIZATION N/A 6/7/2024    Procedure: PCI JOY Stent- Coronary;  Surgeon: Ludwin Burns MD;  Location: [facility];  Service: Cardiovascular;  Laterality: N/A;    OTHER SURGICAL HISTORY  09/25/2020    Hernia repair    OTHER SURGICAL HISTORY  06/03/2022    Thyroidectomy    OTHER SURGICAL HISTORY  06/03/2022    Parathyroidectomy    OTHER SURGICAL HISTORY  06/03/2022    Colonoscopy   [2]   Family History  Problem Relation Name Age of Onset    Heart disease Mother      Prostatitis Father      Other (Cardiac Disorder) Other family hx     Diabetes Other family hx     Other (Malignant Neoplasm of Prostate) Other family hx     Hypertension Other family hx         HTN

## 2025-06-13 ENCOUNTER — APPOINTMENT (OUTPATIENT)
Dept: PRIMARY CARE | Facility: CLINIC | Age: 75
End: 2025-06-13
Payer: COMMERCIAL

## 2025-06-17 ENCOUNTER — APPOINTMENT (OUTPATIENT)
Dept: PRIMARY CARE | Facility: CLINIC | Age: 75
End: 2025-06-17
Payer: COMMERCIAL

## 2025-06-17 VITALS
DIASTOLIC BLOOD PRESSURE: 66 MMHG | BODY MASS INDEX: 23.52 KG/M2 | HEIGHT: 71 IN | OXYGEN SATURATION: 96 % | TEMPERATURE: 98.2 F | WEIGHT: 168 LBS | HEART RATE: 46 BPM | SYSTOLIC BLOOD PRESSURE: 147 MMHG

## 2025-06-17 DIAGNOSIS — E21.3 HYPERPARATHYROIDISM (MULTI): ICD-10-CM

## 2025-06-17 DIAGNOSIS — E11.65 TYPE 2 DIABETES MELLITUS WITH HYPERGLYCEMIA, WITHOUT LONG-TERM CURRENT USE OF INSULIN: Primary | ICD-10-CM

## 2025-06-17 DIAGNOSIS — I10 ESSENTIAL (PRIMARY) HYPERTENSION: ICD-10-CM

## 2025-06-17 DIAGNOSIS — N18.9 CHRONIC KIDNEY DISEASE, UNSPECIFIED CKD STAGE: ICD-10-CM

## 2025-06-17 DIAGNOSIS — I21.4 NSTEMI (NON-ST ELEVATED MYOCARDIAL INFARCTION) (MULTI): ICD-10-CM

## 2025-06-17 LAB
POC FINGERSTICK BLOOD GLUCOSE: 113 MG/DL (ref 70–100)
POC HEMOGLOBIN A1C: 6.4 % (ref 4.2–6.5)

## 2025-06-17 PROCEDURE — 1036F TOBACCO NON-USER: CPT | Performed by: NURSE PRACTITIONER

## 2025-06-17 PROCEDURE — 83036 HEMOGLOBIN GLYCOSYLATED A1C: CPT | Performed by: NURSE PRACTITIONER

## 2025-06-17 PROCEDURE — 3078F DIAST BP <80 MM HG: CPT | Performed by: NURSE PRACTITIONER

## 2025-06-17 PROCEDURE — 82962 GLUCOSE BLOOD TEST: CPT | Performed by: NURSE PRACTITIONER

## 2025-06-17 PROCEDURE — 1160F RVW MEDS BY RX/DR IN RCRD: CPT | Performed by: NURSE PRACTITIONER

## 2025-06-17 PROCEDURE — 1159F MED LIST DOCD IN RCRD: CPT | Performed by: NURSE PRACTITIONER

## 2025-06-17 PROCEDURE — 3077F SYST BP >= 140 MM HG: CPT | Performed by: NURSE PRACTITIONER

## 2025-06-17 PROCEDURE — 99214 OFFICE O/P EST MOD 30 MIN: CPT | Performed by: NURSE PRACTITIONER

## 2025-06-17 ASSESSMENT — ENCOUNTER SYMPTOMS
CARDIOVASCULAR NEGATIVE: 1
GASTROINTESTINAL NEGATIVE: 1
ENDOCRINE NEGATIVE: 1
MUSCULOSKELETAL NEGATIVE: 1
CONSTITUTIONAL NEGATIVE: 1
NEUROLOGICAL NEGATIVE: 1
PSYCHIATRIC NEGATIVE: 1
RESPIRATORY NEGATIVE: 1

## 2025-06-17 ASSESSMENT — PATIENT HEALTH QUESTIONNAIRE - PHQ9
SUM OF ALL RESPONSES TO PHQ9 QUESTIONS 1 AND 2: 0
1. LITTLE INTEREST OR PLEASURE IN DOING THINGS: NOT AT ALL
2. FEELING DOWN, DEPRESSED OR HOPELESS: NOT AT ALL

## 2025-06-17 NOTE — PROGRESS NOTES
" Alfonzo Baldwin is a 75 y.o. here for a diabetic follow up exam.     Pt states they are doing well. Following a low carbohydrate diet and is active.     Up to date with eye and foot exams, pcp visits.     Last 168 - he will eat better no swelling    Meds:  Farxiga 10mg     Dr pearson yearly nepho  Dr kay as well endo - just saw him  Dr lennon cardiology - last seen june 3rd, 2025. And needs labs he says he just ordered some reminded to get dr espinoza requested labs pcp as well.   He is now splitting the metoprolol per cardiology d/t bradycardia - he had dr ying [facility] also eval this in past. He has no s/s.   [facility]     Aic today is 6.4 last 6.7 goal under no changes     Patient was counseled and educated about blood glucose targets and aic goal, carb control and meal planning, medication options, benefits and side effects.      Lab Results   Component Value Date    POCGLU 113 (A) 06/17/2025    POCGLU 130 (A) 03/11/2025    POCGLU 160 (A) 12/11/2024    POCGLU 94 09/12/2024    POCGLU 113 (A) 06/11/2024    POCGLU 215 (A) 03/08/2024    POCGLU 172 (A) 12/08/2023    HGBA1C 6.4 06/17/2025    HGBA1C 6.7 (A) 03/11/2025    HGBA1C 6.7 (A) 12/11/2024    HGBA1C 6.8 (A) 09/12/2024    HGBA1C 7.2 (A) 06/11/2024    HGBA1C 7.2 (A) 03/08/2024    HGBA1C 7.8 (A) 12/08/2023    HGBA1C 5.9 06/19/2020     /66   Pulse (!) 46   Temp 36.8 °C (98.2 °F)   Ht 1.803 m (5' 11\")   Wt 76.2 kg (168 lb)   SpO2 96%   BMI 23.43 kg/m²    Wt Readings from Last 5 Encounters:   06/17/25 76.2 kg (168 lb)   06/03/25 74.9 kg (165 lb 3.2 oz)   03/11/25 74.4 kg (164 lb)   12/11/24 72.1 kg (159 lb)   12/03/24 73 kg (161 lb)          Lab Results   Component Value Date    CREATU 159.0 03/02/2021    MICROALBCREA 18.9 01/18/2021        Review of Systems   Constitutional: Negative.    HENT: Negative.     Respiratory: Negative.     Cardiovascular: Negative.    Gastrointestinal: Negative.    Endocrine: Negative.    Genitourinary: " Negative.    Musculoskeletal: Negative.    Skin: Negative.    Neurological: Negative.    Psychiatric/Behavioral: Negative.          Physical Exam  Vitals and nursing note reviewed.   Constitutional:       General: He is not in acute distress.     Appearance: Normal appearance.   HENT:      Head: Normocephalic and atraumatic.   Eyes:      Extraocular Movements: Extraocular movements intact.      Pupils: Pupils are equal, round, and reactive to light.   Cardiovascular:      Rate and Rhythm: Normal rate and regular rhythm.   Pulmonary:      Effort: Pulmonary effort is normal.      Breath sounds: Normal breath sounds.   Abdominal:      Palpations: Abdomen is soft.   Skin:     General: Skin is warm and dry.   Neurological:      General: No focal deficit present.      Mental Status: He is alert and oriented to person, place, and time.   Psychiatric:         Mood and Affect: Mood normal.         Behavior: Behavior normal.        Assessment & Plan  Type 2 diabetes mellitus with hyperglycemia, without long-term current use of insulin  Under goal  Orders:    POCT glycosylated hemoglobin (Hb A1C) manually resulted    POCT fingerstick glucose manually resulted    NSTEMI (non-ST elevated myocardial infarction) (Multi)         Essential (primary) hypertension         Hyperparathyroidism (Multi)         Chronic kidney disease, unspecified CKD stage              Laura L Seaver, APRN-CNP     I spent a total of documented minutes on the date of service which included preparing to see the patient, face-to-face patient care, completing clinical documentation. Obtained and/or reviewed separately obtained history, counseling and education the patient/family/caregiver, and ordering medications, refills, tests, procedure or consults to specialists.

## 2025-06-17 NOTE — ASSESSMENT & PLAN NOTE
Under goal  Orders:    POCT glycosylated hemoglobin (Hb A1C) manually resulted    POCT fingerstick glucose manually resulted

## 2025-06-27 DIAGNOSIS — E11.21 TYPE 2 DIABETES MELLITUS WITH DIABETIC NEPHROPATHY, WITHOUT LONG-TERM CURRENT USE OF INSULIN: ICD-10-CM

## 2025-06-27 RX ORDER — DAPAGLIFLOZIN 10 MG/1
10 TABLET, FILM COATED ORAL DAILY
Qty: 90 TABLET | Refills: 1 | Status: SHIPPED | OUTPATIENT
Start: 2025-06-27

## 2025-07-30 LAB
25(OH)D3+25(OH)D2 SERPL-MCNC: 52 NG/ML (ref 30–100)
ALBUMIN SERPL-MCNC: 4.6 G/DL (ref 3.6–5.1)
ALP SERPL-CCNC: 46 U/L (ref 35–144)
ALT SERPL-CCNC: 17 U/L (ref 9–46)
ANION GAP SERPL CALCULATED.4IONS-SCNC: 11 MMOL/L (CALC) (ref 7–17)
APPEARANCE UR: CLEAR
AST SERPL-CCNC: 17 U/L (ref 10–35)
BASOPHILS # BLD AUTO: 57 CELLS/UL (ref 0–200)
BASOPHILS NFR BLD AUTO: 0.7 %
BILIRUB SERPL-MCNC: 0.7 MG/DL (ref 0.2–1.2)
BILIRUB UR QL STRIP: NEGATIVE
BUN SERPL-MCNC: 23 MG/DL (ref 7–25)
CALCIUM SERPL-MCNC: 9.8 MG/DL (ref 8.6–10.3)
CHLORIDE SERPL-SCNC: 103 MMOL/L (ref 98–110)
CHOLEST SERPL-MCNC: 159 MG/DL
CHOLEST/HDLC SERPL: 4.4 (CALC)
CO2 SERPL-SCNC: 26 MMOL/L (ref 20–32)
COLOR UR: YELLOW
CREAT SERPL-MCNC: 1.6 MG/DL (ref 0.7–1.28)
EGFRCR SERPLBLD CKD-EPI 2021: 44 ML/MIN/1.73M2
EOSINOPHIL # BLD AUTO: 271 CELLS/UL (ref 15–500)
EOSINOPHIL NFR BLD AUTO: 3.3 %
ERYTHROCYTE [DISTWIDTH] IN BLOOD BY AUTOMATED COUNT: 13 % (ref 11–15)
EST. AVERAGE GLUCOSE BLD GHB EST-MCNC: 148 MG/DL
EST. AVERAGE GLUCOSE BLD GHB EST-SCNC: 8.2 MMOL/L
GLUCOSE SERPL-MCNC: 116 MG/DL (ref 65–99)
GLUCOSE UR QL STRIP: ABNORMAL
HBA1C MFR BLD: 6.8 %
HCT VFR BLD AUTO: 51.6 % (ref 38.5–50)
HDLC SERPL-MCNC: 36 MG/DL
HGB BLD-MCNC: 17.5 G/DL (ref 13.2–17.1)
HGB UR QL STRIP: NEGATIVE
KETONES UR QL STRIP: NEGATIVE
LDLC SERPL CALC-MCNC: 91 MG/DL (CALC)
LEUKOCYTE ESTERASE UR QL STRIP: NEGATIVE
LYMPHOCYTES # BLD AUTO: 1861 CELLS/UL (ref 850–3900)
LYMPHOCYTES NFR BLD AUTO: 22.7 %
MCH RBC QN AUTO: 30.4 PG (ref 27–33)
MCHC RBC AUTO-ENTMCNC: 33.9 G/DL (ref 32–36)
MCV RBC AUTO: 89.7 FL (ref 80–100)
MONOCYTES # BLD AUTO: 705 CELLS/UL (ref 200–950)
MONOCYTES NFR BLD AUTO: 8.6 %
NEUTROPHILS # BLD AUTO: 5305 CELLS/UL (ref 1500–7800)
NEUTROPHILS NFR BLD AUTO: 64.7 %
NITRITE UR QL STRIP: NEGATIVE
NONHDLC SERPL-MCNC: 123 MG/DL (CALC)
PH UR STRIP: 5.5 [PH] (ref 5–8)
PLATELET # BLD AUTO: 150 THOUSAND/UL (ref 140–400)
PMV BLD REES-ECKER: 11.2 FL (ref 7.5–12.5)
POTASSIUM SERPL-SCNC: 4.1 MMOL/L (ref 3.5–5.3)
PROT SERPL-MCNC: 7.2 G/DL (ref 6.1–8.1)
PROT UR QL STRIP: NEGATIVE
PSA SERPL-MCNC: 2.45 NG/ML
RBC # BLD AUTO: 5.75 MILLION/UL (ref 4.2–5.8)
SODIUM SERPL-SCNC: 140 MMOL/L (ref 135–146)
SP GR UR STRIP: 1.02 (ref 1–1.03)
T4 FREE SERPL-MCNC: 1.3 NG/DL (ref 0.8–1.8)
TRIGL SERPL-MCNC: 234 MG/DL
TSH SERPL-ACNC: 12.68 MIU/L (ref 0.4–4.5)
WBC # BLD AUTO: 8.2 THOUSAND/UL (ref 3.8–10.8)

## 2025-09-17 ENCOUNTER — APPOINTMENT (OUTPATIENT)
Dept: PRIMARY CARE | Facility: CLINIC | Age: 75
End: 2025-09-17
Payer: COMMERCIAL

## 2026-06-09 ENCOUNTER — APPOINTMENT (OUTPATIENT)
Dept: CARDIOLOGY | Facility: CLINIC | Age: 76
End: 2026-06-09
Payer: MEDICARE

## (undated) DEVICE — CATHETER, OPTITORQUE, 6FR, JACKY, BL3.5/2H/100CM

## (undated) DEVICE — MICROCATHETER, ASAHI CORSAIR PRO XS, 135CM

## (undated) DEVICE — TR BAND, RADIAL COMPRESSION, STANDARD, 24CM

## (undated) DEVICE — GUIDEWIRE, UNIVERSAL BALANCE MID WEIGHT

## (undated) DEVICE — CATHETER, BALLOON, EMERGE, PTCA, 15 X 2.50MM

## (undated) DEVICE — GUIDE WIRE, 260CM, HI-TORQUE, VERSACORE, MODIFIED J

## (undated) DEVICE — SHEATH, GLIDESHEATH, SLENDER, 6FR 10CM

## (undated) DEVICE — GUIDEWIRE, HI-TORQUE, WHISPER ES, 300CM, STRT

## (undated) DEVICE — CATHETER, OPTITORQUE, 6FR 100CM, TIGER RADIAL 4.0

## (undated) DEVICE — CATHETER, BALLOON, MONORAIL, NC EMERGE, PTCA, 15MM X 3.00MM

## (undated) DEVICE — CATHETER, GUIDING, LAUNCHER, 6FR EBU 3.5

## (undated) DEVICE — GUIDEWIRE, UNIVERSAL BALANCE MID WEIGHT, 300CM, W/MARKER, STR

## (undated) DEVICE — Device

## (undated) DEVICE — GUIDEWIRE, RUN THROUGH WIRE, 180CM